# Patient Record
Sex: FEMALE | Race: WHITE | NOT HISPANIC OR LATINO | Employment: UNEMPLOYED | URBAN - METROPOLITAN AREA
[De-identification: names, ages, dates, MRNs, and addresses within clinical notes are randomized per-mention and may not be internally consistent; named-entity substitution may affect disease eponyms.]

---

## 2017-05-06 DIAGNOSIS — E78.5 HYPERLIPIDEMIA: ICD-10-CM

## 2017-08-04 ENCOUNTER — APPOINTMENT (OUTPATIENT)
Dept: LAB | Facility: HOSPITAL | Age: 42
End: 2017-08-04
Payer: COMMERCIAL

## 2017-08-04 ENCOUNTER — ALLSCRIPTS OFFICE VISIT (OUTPATIENT)
Dept: OTHER | Facility: OTHER | Age: 42
End: 2017-08-04

## 2017-08-04 DIAGNOSIS — N39.0 URINARY TRACT INFECTION: ICD-10-CM

## 2017-08-04 LAB
BILIRUB UR QL STRIP: NORMAL
CLARITY UR: NORMAL
COLOR UR: YELLOW
GLUCOSE (HISTORICAL): NORMAL
HGB UR QL STRIP.AUTO: NORMAL
KETONES UR STRIP-MCNC: NORMAL MG/DL
LEUKOCYTE ESTERASE UR QL STRIP: NORMAL
NITRITE UR QL STRIP: NORMAL
PH UR STRIP.AUTO: 5.5 [PH]
PROT UR STRIP-MCNC: 30 MG/DL
SP GR UR STRIP.AUTO: 1.03
UROBILINOGEN UR QL STRIP.AUTO: 0.2

## 2017-08-04 PROCEDURE — 87086 URINE CULTURE/COLONY COUNT: CPT

## 2017-08-06 LAB — BACTERIA UR CULT: NORMAL

## 2017-08-07 ENCOUNTER — GENERIC CONVERSION - ENCOUNTER (OUTPATIENT)
Dept: OTHER | Facility: OTHER | Age: 42
End: 2017-08-07

## 2017-08-15 ENCOUNTER — GENERIC CONVERSION - ENCOUNTER (OUTPATIENT)
Dept: OTHER | Facility: OTHER | Age: 42
End: 2017-08-15

## 2017-09-08 ENCOUNTER — ALLSCRIPTS OFFICE VISIT (OUTPATIENT)
Dept: OTHER | Facility: OTHER | Age: 42
End: 2017-09-08

## 2017-12-10 ENCOUNTER — HOSPITAL ENCOUNTER (EMERGENCY)
Facility: HOSPITAL | Age: 42
Discharge: HOME | End: 2017-12-10
Admitting: EMERGENCY MEDICINE
Payer: COMMERCIAL

## 2017-12-10 ENCOUNTER — APPOINTMENT (EMERGENCY)
Dept: CT IMAGING | Facility: HOSPITAL | Age: 42
End: 2017-12-10
Payer: COMMERCIAL

## 2017-12-10 VITALS
HEART RATE: 88 BPM | OXYGEN SATURATION: 98 % | SYSTOLIC BLOOD PRESSURE: 124 MMHG | TEMPERATURE: 98.2 F | RESPIRATION RATE: 16 BRPM | DIASTOLIC BLOOD PRESSURE: 72 MMHG | WEIGHT: 170 LBS

## 2017-12-10 DIAGNOSIS — N20.1 LEFT URETERAL STONE: Primary | ICD-10-CM

## 2017-12-10 LAB
ANION GAP SERPL CALCULATED.3IONS-SCNC: 5 MMOL/L (ref 4–13)
BACTERIA UR QL AUTO: ABNORMAL /HPF
BASOPHILS # BLD AUTO: 0.08 THOUSANDS/ΜL (ref 0–0.1)
BASOPHILS NFR BLD AUTO: 1 % (ref 0–1)
BILIRUB UR QL STRIP: NEGATIVE
BUN SERPL-MCNC: 11 MG/DL (ref 5–25)
CALCIUM SERPL-MCNC: 9.3 MG/DL (ref 8.3–10.1)
CHLORIDE SERPL-SCNC: 103 MMOL/L (ref 100–108)
CLARITY UR: ABNORMAL
CO2 SERPL-SCNC: 30 MMOL/L (ref 21–32)
COLOR UR: YELLOW
CREAT SERPL-MCNC: 0.9 MG/DL (ref 0.6–1.3)
EOSINOPHIL # BLD AUTO: 0.6 THOUSAND/ΜL (ref 0–0.61)
EOSINOPHIL NFR BLD AUTO: 5 % (ref 0–6)
ERYTHROCYTE [DISTWIDTH] IN BLOOD BY AUTOMATED COUNT: 13.1 % (ref 11.6–15.1)
GFR SERPL CREATININE-BSD FRML MDRD: 80 ML/MIN/1.73SQ M
GLUCOSE SERPL-MCNC: 267 MG/DL (ref 65–140)
GLUCOSE UR STRIP-MCNC: ABNORMAL MG/DL
HCT VFR BLD AUTO: 41.1 % (ref 34.8–46.1)
HGB BLD-MCNC: 14.2 G/DL (ref 11.5–15.4)
HGB UR QL STRIP.AUTO: ABNORMAL
KETONES UR STRIP-MCNC: NEGATIVE MG/DL
LEUKOCYTE ESTERASE UR QL STRIP: NEGATIVE
LYMPHOCYTES # BLD AUTO: 1.54 THOUSANDS/ΜL (ref 0.6–4.47)
LYMPHOCYTES NFR BLD AUTO: 12 % (ref 14–44)
MCH RBC QN AUTO: 31.6 PG (ref 26.8–34.3)
MCHC RBC AUTO-ENTMCNC: 34.5 G/DL (ref 31.4–37.4)
MCV RBC AUTO: 92 FL (ref 82–98)
MONOCYTES # BLD AUTO: 0.65 THOUSAND/ΜL (ref 0.17–1.22)
MONOCYTES NFR BLD AUTO: 5 % (ref 4–12)
NEUTROPHILS # BLD AUTO: 9.63 THOUSANDS/ΜL (ref 1.85–7.62)
NEUTS SEG NFR BLD AUTO: 77 % (ref 43–75)
NITRITE UR QL STRIP: NEGATIVE
NON-SQ EPI CELLS URNS QL MICRO: ABNORMAL /HPF
NRBC BLD AUTO-RTO: 0 /100 WBCS
PH UR STRIP.AUTO: 5 [PH] (ref 4.5–8)
PLATELET # BLD AUTO: 273 THOUSANDS/UL (ref 149–390)
PMV BLD AUTO: 11.7 FL (ref 8.9–12.7)
POTASSIUM SERPL-SCNC: 3.8 MMOL/L (ref 3.5–5.3)
PROT UR STRIP-MCNC: ABNORMAL MG/DL
RBC # BLD AUTO: 4.49 MILLION/UL (ref 3.81–5.12)
RBC #/AREA URNS AUTO: ABNORMAL /HPF
SODIUM SERPL-SCNC: 138 MMOL/L (ref 136–145)
SP GR UR STRIP.AUTO: 1.02 (ref 1–1.03)
UROBILINOGEN UR QL STRIP.AUTO: 0.2 E.U./DL
WBC # BLD AUTO: 12.5 THOUSAND/UL (ref 4.31–10.16)
WBC #/AREA URNS AUTO: ABNORMAL /HPF

## 2017-12-10 PROCEDURE — 96374 THER/PROPH/DIAG INJ IV PUSH: CPT

## 2017-12-10 PROCEDURE — 85025 COMPLETE CBC W/AUTO DIFF WBC: CPT | Performed by: PHYSICIAN ASSISTANT

## 2017-12-10 PROCEDURE — 80048 BASIC METABOLIC PNL TOTAL CA: CPT | Performed by: PHYSICIAN ASSISTANT

## 2017-12-10 PROCEDURE — 99284 EMERGENCY DEPT VISIT MOD MDM: CPT

## 2017-12-10 PROCEDURE — 74176 CT ABD & PELVIS W/O CONTRAST: CPT

## 2017-12-10 PROCEDURE — 81001 URINALYSIS AUTO W/SCOPE: CPT

## 2017-12-10 PROCEDURE — 96361 HYDRATE IV INFUSION ADD-ON: CPT

## 2017-12-10 PROCEDURE — 36415 COLL VENOUS BLD VENIPUNCTURE: CPT | Performed by: PHYSICIAN ASSISTANT

## 2017-12-10 RX ORDER — OXYCODONE HYDROCHLORIDE AND ACETAMINOPHEN 5; 325 MG/1; MG/1
1 TABLET ORAL EVERY 4 HOURS PRN
Qty: 10 TABLET | Refills: 0 | Status: SHIPPED | OUTPATIENT
Start: 2017-12-10 | End: 2017-12-10

## 2017-12-10 RX ORDER — HYDROCODONE BITARTRATE AND ACETAMINOPHEN 5; 325 MG/1; MG/1
1 TABLET ORAL EVERY 6 HOURS PRN
Qty: 10 TABLET | Refills: 0 | Status: SHIPPED | OUTPATIENT
Start: 2017-12-10 | End: 2017-12-13

## 2017-12-10 RX ORDER — KETOROLAC TROMETHAMINE 30 MG/ML
15 INJECTION, SOLUTION INTRAMUSCULAR; INTRAVENOUS ONCE
Status: COMPLETED | OUTPATIENT
Start: 2017-12-10 | End: 2017-12-10

## 2017-12-10 RX ADMIN — KETOROLAC TROMETHAMINE 15 MG: 30 INJECTION, SOLUTION INTRAMUSCULAR at 15:28

## 2017-12-10 RX ADMIN — SODIUM CHLORIDE 1000 ML: 0.9 INJECTION, SOLUTION INTRAVENOUS at 15:28

## 2017-12-10 NOTE — DISCHARGE INSTRUCTIONS
Ureteral Stones   WHAT YOU NEED TO KNOW:   A ureteral stone is a stone that forms in the kidney and moves down the ureter and gets stuck there  The ureter is the tube that takes urine from the kidney to the bladder  Stones can form in the urinary system when your urine has high levels of minerals and salts  Urinary stones can be made of uric acid, calcium, phosphate, or oxalate crystals  DISCHARGE INSTRUCTIONS:   Return to the emergency department if:   · You have severe pain that does not improve, even after you take medicine  · You have vomiting that is not relieved by medicine  · You develop a fever  Contact your healthcare provider if:   · You develop a fever  · You have any questions or concerns about your condition or care  Follow up with your healthcare provider as directed: You may need to return for more tests  Write down your questions so you remember to ask them during your visits  Medicines: You may need any of the following:  · NSAIDs , such as ibuprofen, help decrease swelling, pain, and fever  This medicine is available with or without a doctor's order  NSAIDs can cause stomach bleeding or kidney problems in certain people  If you take blood thinner medicine, always ask your healthcare provider if NSAIDs are safe for you  Always read the medicine label and follow directions  · Prescription pain medicine  may help decrease pain or help your ureteral stone pass  Do not wait until the pain is severe before you take pain medicine  · Nausea medicine  may help calm your stomach and prevent vomiting  · Take your medicine as directed  Contact your healthcare provider if you think your medicine is not helping or if you have side effects  Tell him or her if you are allergic to any medicine  Keep a list of the medicines, vitamins, and herbs you take  Include the amounts, and when and why you take them  Bring the list or the pill bottles to follow-up visits   Carry your medicine list with you in case of an emergency  Self-care:   · Drink plenty of liquids  Your healthcare provider may tell you to drink at least 8 to 12 (eight-ounce) cups of liquids each day  This helps flush out the ureteral stones when you urinate  Water is the best liquid to drink  · Strain your urine every time you go to the bathroom  Urinate through a strainer or a piece of thin cloth to catch the stones  Take the stones to your healthcare provider so they can be sent to the lab for tests  This will help your healthcare providers plan the best treatment for you  · Ask your healthcare provider about any nutrition changes you need to make  You may need to limit certain foods such as foods high in sodium (salt), certain protein foods, or foods high in oxalate  After you pass your ureteral stone: Once you have passed your ureteral stone, you may need to do a 24-hour urine test  You may need to save all of your urine for 24 hours  Each time you go to the bathroom, you will urinate into a container  Then you will pour your urine into a larger container that is kept cold  You may be told to write down the time and amount of urine you passed  At the end of 24 hours, the urine is sent to a lab for tests  Results from the test will help your healthcare provider plan ways to prevent more stones from forming  © 2017 [address] Information is for End User's use only and may not be sold, redistributed or otherwise used for commercial purposes  All illustrations and images included in CareNotes® are the copyrighted property of [employer] Inc  or [employer]  The above information is an  only  It is not intended as medical advice for individual conditions or treatments  Talk to your doctor, nurse or pharmacist before following any medical regimen to see if it is safe and effective for you        DISCHARGE INSTRUCTIONS:    FOLLOW UP WITH YOUR PRIMARY CARE PROVIDER OR THE [facility]  MAKE AN APPOINTMENT TO BE SEEN  TAKE TYLENOL OR MOTRIN FOR MILD PAIN  TAKE PERCOCET AS PRESCRIBED FOR MODERATE PAIN  IF RASH, SHORTNESS OF BREATH OR TROUBLE SWALLOWING, STOP TAKING THE MEDICATION AND BE SEEN  NO DRINKING, DRIVING OR OPERATING HEAVY MACHINERY WHILE TAKING THIS MEDICATION  FOLLOW UP WITH YOUR UROLOGIST  REST AND DRINK PLENTY OF FLUIDS  IF SYMPTOMS WORSEN OR NEW SYMPTOMS ARISE, RETURN TO THE ER TO BE SEEN

## 2017-12-10 NOTE — ED PROVIDER NOTES
History  Chief Complaint   Patient presents with    Flank Pain     C/O left sided flank pain started 20 min ago had stones in past states feels same also c/o nausea and sweating     42y  o female with PMH of anxiety, depression, DM, hypothyroid, neuropathy, PVC and kidney stones presents to the ER for left flank pain for 2 hours  Patient denies taking any medication for pain  She describes her pain as sharp and radiating to her LLQ  She rates her pain 6/10 and states it comes and goes  Patient states she sees Dr Baldo Richter from Urology  Associated symptoms: nausea  She denies fever, chills, chest pain, dyspnea, vomiting, diarrhea, hematuria, dysuria, frequency, urgency, weakness or paresthesias  Patient states this feels like when she had a kidney stone in the past         History provided by:  Patient   used: No        None       Past Medical History:   Diagnosis Date    Anxiety     Depression     Diabetes mellitus ([facility] [address] )     Disease of thyroid gland     Neuropathy     PVC (premature ventricular contraction)        Past Surgical History:   Procedure Laterality Date    CARPAL TUNNEL RELEASE       SECTION      FINGER SURGERY      HYSTERECTOMY      KNEE SURGERY         History reviewed  No pertinent family history  I have reviewed and agree with the history as documented  Social History   Substance Use Topics    Smoking status: Current Some Day Smoker    Smokeless tobacco: Never Used    Alcohol use Yes      Comment: occasional        Review of Systems   Constitutional: Negative for chills and fever  Respiratory: Negative for shortness of breath  Cardiovascular: Negative for chest pain  Gastrointestinal: Positive for abdominal pain and nausea  Negative for diarrhea and vomiting  Genitourinary: Positive for flank pain  Negative for dysuria, frequency, hematuria and urgency  Musculoskeletal: Negative for neck stiffness  Skin: Negative for rash  Allergic/Immunologic: Negative for food allergies  Neurological: Negative for weakness and numbness  Physical Exam  ED Triage Vitals [12/10/17 1408]   Temperature Pulse Respirations Blood Pressure SpO2   98 2 °F (36 8 °C) 85 18 132/74 98 %      Temp Source Heart Rate Source Patient Position - Orthostatic VS BP Location FiO2 (%)   Oral Monitor Sitting Right arm --      Pain Score       7           Orthostatic Vital Signs  Vitals:    12/10/17 1408 12/10/17 1656   BP: 132/74 124/72   Pulse: 85 88   Patient Position - Orthostatic VS: Sitting Lying       Physical Exam   Constitutional:  Non-toxic appearance  No distress  HENT:   Head: Normocephalic and atraumatic  Right Ear: Tympanic membrane, external ear and ear canal normal  No drainage, swelling or tenderness  No foreign bodies  Tympanic membrane is not erythematous  No hemotympanum  Left Ear: Tympanic membrane, external ear and ear canal normal  No drainage, swelling or tenderness  No foreign bodies  Tympanic membrane is not erythematous  No hemotympanum  Nose: Nose normal    Mouth/Throat: Uvula is midline, oropharynx is clear and moist and mucous membranes are normal  No uvula swelling  No posterior oropharyngeal edema, posterior oropharyngeal erythema or tonsillar abscesses  No tonsillar exudate  Neck: Normal range of motion and phonation normal  Neck supple  No tracheal deviation present  Cardiovascular: Normal rate, regular rhythm, S1 normal, S2 normal and normal heart sounds  Exam reveals no gallop and no friction rub  No murmur heard  Pulmonary/Chest: Effort normal and breath sounds normal  No respiratory distress  She has no decreased breath sounds  She has no wheezes  She has no rhonchi  She has no rales  She exhibits no tenderness  Abdominal: Soft  Bowel sounds are normal  She exhibits no distension  There is no tenderness  There is CVA tenderness (left)  There is no rebound and no guarding  Neurological: She is alert   GCS eye subscore is 4  GCS verbal subscore is 5  GCS motor subscore is 6  Skin: Skin is warm and dry  No rash noted  Psychiatric: She has a normal mood and affect  Nursing note and vitals reviewed  ED Medications  Medications   sodium chloride 0 9 % bolus 1,000 mL (0 mL Intravenous Stopped 12/10/17 1657)   ketorolac (TORADOL) injection 15 mg (15 mg Intravenous Given 12/10/17 1528)       Diagnostic Studies  Results Reviewed     Procedure Component Value Units Date/Time    Basic metabolic panel [[id number]]  (Abnormal) Collected:  12/10/17 1531    Lab Status:  Final result Specimen:  Blood from Arm, Right Updated:  12/10/17 1554     Sodium 138 mmol/L      Potassium 3 8 mmol/L      Chloride 103 mmol/L      CO2 30 mmol/L      Anion Gap 5 mmol/L      BUN 11 mg/dL      Creatinine 0 90 mg/dL      Glucose 267 (H) mg/dL      Calcium 9 3 mg/dL      eGFR 80 ml/min/1 73sq m     Narrative:         National Kidney Disease Education Program recommendations are as follows:  GFR calculation is accurate only with a steady state creatinine  Chronic Kidney disease less than 60 ml/min/1 73 sq  meters  Kidney failure less than 15 ml/min/1 73 sq  meters      CBC and differential [[id number]]  (Abnormal) Collected:  12/10/17 1531    Lab Status:  Final result Specimen:  Blood from Arm, Right Updated:  12/10/17 1545     WBC 12 50 (H) Thousand/uL      RBC 4 49 Million/uL      Hemoglobin 14 2 g/dL      Hematocrit 41 1 %      MCV 92 fL      MCH 31 6 pg      MCHC 34 5 g/dL      RDW 13 1 %      MPV 11 7 fL      Platelets 227 Thousands/uL      nRBC 0 /100 WBCs      Neutrophils Relative 77 (H) %      Lymphocytes Relative 12 (L) %      Monocytes Relative 5 %      Eosinophils Relative 5 %      Basophils Relative 1 %      Neutrophils Absolute 9 63 (H) Thousands/µL      Lymphocytes Absolute 1 54 Thousands/µL      Monocytes Absolute 0 65 Thousand/µL      Eosinophils Absolute 0 60 Thousand/µL      Basophils Absolute 0 08 Thousands/µL     Urine Microscopic [[id number]]  (Abnormal) Collected:  12/10/17 1454    Lab Status:  Final result Specimen:  Urine from Urine, Clean Catch Updated:  12/10/17 1507     RBC, UA 30-50 (A) /hpf      WBC, UA 4-10 (A) /hpf      Epithelial Cells None Seen /hpf      Bacteria, UA Occasional /hpf     ED Urine Macroscopic [[id number]]  (Abnormal) Collected:  12/10/17 1454    Lab Status:  Final result Specimen:  Urine Updated:  12/10/17 1438     Color, UA Yellow     Clarity, UA Cloudy     pH, UA 5 0     Leukocytes, UA Negative     Nitrite, UA Negative     Protein, UA 30 (1+) (A) mg/dl      Glucose,  (1/2%) (A) mg/dl      Ketones, UA Negative mg/dl      Urobilinogen, UA 0 2 E U /dl      Bilirubin, UA Negative     Blood, UA Large (A)     Specific [place], UA 1 020    Narrative:       CLINITEK RESULT                 CT renal stone study abdomen pelvis without contrast   Final Result by Payal Sandhu MD (12/10 1518)      5 mm proximal left ureteral calculus with mild left hydronephrosis  Nonobstructing left renal calculus  Workstation performed: [id number]                    Procedures  Procedures       Phone Contacts  ED Phone Contact    ED Course  ED Course as of Dec 10 2202   Magnolia Finch Dec 10, 2017   1625 Patient reports improvement in pain  Will discharge  1640 Patient states Percocet makes her vomit  Patient requesting Norco instead  MDM  Number of Diagnoses or Management Options  Left ureteral stone: new and requires workup  Diagnosis management comments: DDX consists of but not limited to: kidney stone, UTI, pyelonephritis, diverticulitis    Will check CBC and BMP  Will give fluids and Toradol for pain  Will CT abdomen to r/o stone  1625 Patient reports improvement in pain  Informed of CT findings and blood work results  Will discharge  1640 Patient states Percocet makes her vomit  Patient requesting Norco instead        At discharge, I instructed the patient to:  -follow up with pcp  -take Tylenol or Motrin for mild pain  -take Norco as prescribed for moderate pain  -follow up with the recommended Urologist  -rest and drink plenty of fluids  -return to the ER if symptoms worsened or new symptoms arose  Patient agreed to this plan and was stable at time of discharge  Amount and/or Complexity of Data Reviewed  Clinical lab tests: ordered and reviewed  Tests in the radiology section of CPT®: ordered and reviewed    Patient Progress  Patient progress: stable    CritCare Time    Disposition  Final diagnoses:   Left ureteral stone     Time reflects when diagnosis was documented in both MDM as applicable and the Disposition within this note     Time User Action Codes Description Comment    12/10/2017  4:27 PM Kaity Cassette Add [N20 1] Left ureteral stone       ED Disposition     ED Disposition Condition Comment    Discharge  Joe Vital discharge to home/self care  Condition at discharge: Stable        Follow-up Information     Follow up With Specialties Details Why [address], DO Internal Medicine Schedule an appointment as soon as possible for a visit in 1 day  [facility] [phone]  Lou Correia [phone], MD Urology Schedule an appointment as soon as possible for a visit in 1 day  [address] [place], [place] [phone]          Discharge Medication List as of 12/10/2017  4:29 PM      START taking these medications    Details   oxyCODONE-acetaminophen (PERCOCET) 5-325 mg per tablet Take 1 tablet by mouth every 4 (four) hours as needed for moderate pain Max Daily Amount: 6 tablets, Starting Sun 12/10/2017, Print           No discharge procedures on file      ED Provider  Electronically Signed by           Tisha Gonzalez PA-C  12/10/17 1835

## 2017-12-11 ENCOUNTER — GENERIC CONVERSION - ENCOUNTER (OUTPATIENT)
Dept: OTHER | Facility: OTHER | Age: 42
End: 2017-12-11

## 2017-12-11 ENCOUNTER — GENERIC CONVERSION - ENCOUNTER (OUTPATIENT)
Dept: UROLOGY | Facility: MEDICAL CENTER | Age: 42
End: 2017-12-11

## 2017-12-11 ENCOUNTER — APPOINTMENT (OUTPATIENT)
Dept: LAB | Facility: HOSPITAL | Age: 42
End: 2017-12-11
Attending: UROLOGY
Payer: COMMERCIAL

## 2017-12-11 ENCOUNTER — ALLSCRIPTS OFFICE VISIT (OUTPATIENT)
Dept: OTHER | Facility: OTHER | Age: 42
End: 2017-12-11

## 2017-12-11 ENCOUNTER — TRANSCRIBE ORDERS (OUTPATIENT)
Dept: ADMINISTRATIVE | Facility: HOSPITAL | Age: 42
End: 2017-12-11

## 2017-12-11 ENCOUNTER — HOSPITAL ENCOUNTER (OUTPATIENT)
Dept: NON INVASIVE DIAGNOSTICS | Facility: HOSPITAL | Age: 42
Discharge: HOME | End: 2017-12-11
Attending: UROLOGY
Payer: COMMERCIAL

## 2017-12-11 DIAGNOSIS — N39.0 URINARY TRACT INFECTION WITHOUT HEMATURIA, SITE UNSPECIFIED: ICD-10-CM

## 2017-12-11 DIAGNOSIS — N39.0 URINARY TRACT INFECTION: ICD-10-CM

## 2017-12-11 DIAGNOSIS — N39.0 URINARY TRACT INFECTION WITHOUT HEMATURIA, SITE UNSPECIFIED: Primary | ICD-10-CM

## 2017-12-11 LAB
APTT PPP: 33 SECONDS (ref 23–35)
BILIRUB UR QL STRIP: NORMAL
CLARITY UR: NORMAL
COLOR UR: YELLOW
GLUCOSE (HISTORICAL): 500
HGB UR QL STRIP.AUTO: NORMAL
INR PPP: 0.92 (ref 0.86–1.16)
KETONES UR STRIP-MCNC: NORMAL MG/DL
LEUKOCYTE ESTERASE UR QL STRIP: NORMAL
NITRITE UR QL STRIP: NORMAL
PH UR STRIP.AUTO: 5.5 [PH]
PROT UR STRIP-MCNC: NORMAL MG/DL
PROTHROMBIN TIME: 12.4 SECONDS (ref 12.1–14.4)
SP GR UR STRIP.AUTO: 1.02
UROBILINOGEN UR QL STRIP.AUTO: 0.2

## 2017-12-11 PROCEDURE — 85730 THROMBOPLASTIN TIME PARTIAL: CPT

## 2017-12-11 PROCEDURE — 85610 PROTHROMBIN TIME: CPT

## 2017-12-11 PROCEDURE — 87086 URINE CULTURE/COLONY COUNT: CPT

## 2017-12-11 PROCEDURE — 36415 COLL VENOUS BLD VENIPUNCTURE: CPT

## 2017-12-11 PROCEDURE — 93005 ELECTROCARDIOGRAM TRACING: CPT

## 2017-12-12 LAB
ATRIAL RATE: 71 BPM
P AXIS: 60 DEGREES
PR INTERVAL: 144 MS
QRS AXIS: 45 DEGREES
QRSD INTERVAL: 72 MS
QT INTERVAL: 388 MS
QTC INTERVAL: 421 MS
T WAVE AXIS: 39 DEGREES
VENTRICULAR RATE: 71 BPM

## 2017-12-13 ENCOUNTER — ANESTHESIA EVENT (OUTPATIENT)
Dept: PERIOP | Facility: HOSPITAL | Age: 42
End: 2017-12-13
Payer: COMMERCIAL

## 2017-12-13 LAB
BACTERIA UR CULT: ABNORMAL
BACTERIA UR CULT: ABNORMAL

## 2017-12-13 RX ORDER — MONTELUKAST SODIUM 10 MG/1
10 TABLET ORAL
COMMUNITY

## 2017-12-13 RX ORDER — HYDROCODONE BITARTRATE AND ACETAMINOPHEN 10; 300 MG/1; MG/1
1 TABLET ORAL EVERY 6 HOURS PRN
COMMUNITY
End: 2017-12-26 | Stop reason: ALTCHOICE

## 2017-12-13 RX ORDER — LEVOTHYROXINE SODIUM 88 UG/1
88 TABLET ORAL EVERY EVENING
Status: ON HOLD | COMMUNITY
End: 2017-12-14 | Stop reason: SDUPTHER

## 2017-12-13 RX ORDER — ATORVASTATIN CALCIUM 20 MG/1
20 TABLET, FILM COATED ORAL EVERY EVENING
COMMUNITY
End: 2018-04-20 | Stop reason: SDUPTHER

## 2017-12-13 RX ORDER — SERTRALINE HCL 100 MG
200 TABLET ORAL
COMMUNITY
End: 2017-12-26 | Stop reason: DRUGHIGH

## 2017-12-13 RX ORDER — METOPROLOL SUCCINATE 25 MG/1
25 TABLET, EXTENDED RELEASE ORAL EVERY EVENING
COMMUNITY

## 2017-12-13 NOTE — PRE-PROCEDURE INSTRUCTIONS
Pre-Surgery Instructions:   Medication Instructions    atorvastatin (LIPITOR) 20 mg tablet Instructed patient per Anesthesia Guidelines   HYDROcodone-acetaminophen (XODOL )  MG per tablet Instructed patient per Anesthesia Guidelines   insulin glulisine (APIDRA) 100 units/mL Instructed patient per Anesthesia Guidelines   levothyroxine (SYNTHROID) 88 mcg tablet Instructed patient per Anesthesia Guidelines   metoprolol succinate (TOPROL-XL) 25 mg 24 hr tablet Instructed patient per Anesthesia Guidelines   montelukast (SINGULAIR) 10 mg tablet Instructed patient per Anesthesia Guidelines   sertraline (ZOLOFT) 100 mg tablet Instructed patient per Anesthesia Guidelines  Pt spoke to Dr Ab Tapia regarding insulin pump and understands what she needs to do for tomorrow's surgery, as she has in the past she reports "she lessens basal rates so blood sugar doesn't drop too low"  He answered pt's questions and concerns  Pt via phone  given/reviewed [facility] preop instructions and she plans to  chlorhexadine soap

## 2017-12-14 ENCOUNTER — APPOINTMENT (OUTPATIENT)
Dept: RADIOLOGY | Facility: HOSPITAL | Age: 42
End: 2017-12-14
Payer: COMMERCIAL

## 2017-12-14 ENCOUNTER — GENERIC CONVERSION - ENCOUNTER (OUTPATIENT)
Dept: OTHER | Facility: OTHER | Age: 42
End: 2017-12-14

## 2017-12-14 ENCOUNTER — HOSPITAL ENCOUNTER (OUTPATIENT)
Facility: HOSPITAL | Age: 42
Setting detail: OUTPATIENT SURGERY
Discharge: HOME | End: 2017-12-14
Attending: UROLOGY | Admitting: UROLOGY
Payer: COMMERCIAL

## 2017-12-14 ENCOUNTER — HOSPITAL ENCOUNTER (OUTPATIENT)
Dept: RADIOLOGY | Facility: HOSPITAL | Age: 42
Setting detail: OUTPATIENT SURGERY
Discharge: HOME | End: 2017-12-14
Payer: COMMERCIAL

## 2017-12-14 ENCOUNTER — ANESTHESIA (OUTPATIENT)
Dept: PERIOP | Facility: HOSPITAL | Age: 42
End: 2017-12-14
Payer: COMMERCIAL

## 2017-12-14 VITALS
OXYGEN SATURATION: 98 % | SYSTOLIC BLOOD PRESSURE: 114 MMHG | BODY MASS INDEX: 30.12 KG/M2 | DIASTOLIC BLOOD PRESSURE: 71 MMHG | RESPIRATION RATE: 16 BRPM | HEART RATE: 83 BPM | TEMPERATURE: 98.4 F | HEIGHT: 63 IN | WEIGHT: 170 LBS

## 2017-12-14 DIAGNOSIS — N20.1 CALCULUS OF URETER: ICD-10-CM

## 2017-12-14 LAB
GLUCOSE SERPL-MCNC: 132 MG/DL (ref 65–140)
GLUCOSE SERPL-MCNC: 76 MG/DL (ref 65–140)

## 2017-12-14 PROCEDURE — C2625 STENT, NON-COR, TEM W/DEL SY: HCPCS | Performed by: UROLOGY

## 2017-12-14 PROCEDURE — 74000 HB X-RAY EXAM OF ABDOMEN (SINGLE ANTEROPOSTERIOR VIEW): CPT

## 2017-12-14 PROCEDURE — C1726 CATH, BAL DIL, NON-VASCULAR: HCPCS | Performed by: UROLOGY

## 2017-12-14 PROCEDURE — 82948 REAGENT STRIP/BLOOD GLUCOSE: CPT

## 2017-12-14 PROCEDURE — 74450 X-RAY URETHRA/BLADDER: CPT

## 2017-12-14 DEVICE — KWART RETRO-INJECT URETERAL STENT SET
Type: IMPLANTABLE DEVICE | Site: URETER | Status: FUNCTIONAL
Brand: KWART

## 2017-12-14 RX ORDER — MAGNESIUM HYDROXIDE 1200 MG/15ML
LIQUID ORAL AS NEEDED
Status: DISCONTINUED | OUTPATIENT
Start: 2017-12-14 | End: 2017-12-14 | Stop reason: HOSPADM

## 2017-12-14 RX ORDER — OXYCODONE HYDROCHLORIDE AND ACETAMINOPHEN 5; 325 MG/1; MG/1
1 TABLET ORAL EVERY 4 HOURS PRN
Status: DISCONTINUED | OUTPATIENT
Start: 2017-12-14 | End: 2017-12-14

## 2017-12-14 RX ORDER — MIDAZOLAM HYDROCHLORIDE 1 MG/ML
INJECTION INTRAMUSCULAR; INTRAVENOUS AS NEEDED
Status: DISCONTINUED | OUTPATIENT
Start: 2017-12-14 | End: 2017-12-14 | Stop reason: SURG

## 2017-12-14 RX ORDER — SERTRALINE HYDROCHLORIDE 100 MG/1
200 TABLET, FILM COATED ORAL DAILY
Status: ON HOLD | COMMUNITY
Start: 2017-06-12 | End: 2017-12-14 | Stop reason: SDUPTHER

## 2017-12-14 RX ORDER — SCOLOPAMINE TRANSDERMAL SYSTEM 1 MG/1
1 PATCH, EXTENDED RELEASE TRANSDERMAL ONCE AS NEEDED
Status: DISCONTINUED | OUTPATIENT
Start: 2017-12-14 | End: 2017-12-14 | Stop reason: HOSPADM

## 2017-12-14 RX ORDER — ONDANSETRON 2 MG/ML
4 INJECTION INTRAMUSCULAR; INTRAVENOUS ONCE AS NEEDED
Status: DISCONTINUED | OUTPATIENT
Start: 2017-12-14 | End: 2017-12-14 | Stop reason: HOSPADM

## 2017-12-14 RX ORDER — LEVOTHYROXINE SODIUM 88 UG/1
88 TABLET ORAL DAILY
COMMUNITY
Start: 2017-08-31 | End: 2020-11-11 | Stop reason: SDUPTHER

## 2017-12-14 RX ORDER — METOPROLOL SUCCINATE 25 MG/1
25 TABLET, EXTENDED RELEASE ORAL DAILY
Status: ON HOLD | COMMUNITY
Start: 2017-02-11 | End: 2017-12-14 | Stop reason: SDUPTHER

## 2017-12-14 RX ORDER — HYDROCODONE BITARTRATE AND ACETAMINOPHEN 10; 300 MG/1; MG/1
1 TABLET ORAL EVERY 6 HOURS PRN
Status: ON HOLD | COMMUNITY
Start: 2017-12-11 | End: 2017-12-14 | Stop reason: SDUPTHER

## 2017-12-14 RX ORDER — ALPRAZOLAM 0.5 MG/1
0.5 TABLET ORAL 2 TIMES DAILY PRN
COMMUNITY
Start: 2017-08-21

## 2017-12-14 RX ORDER — FENTANYL CITRATE 50 UG/ML
INJECTION, SOLUTION INTRAMUSCULAR; INTRAVENOUS AS NEEDED
Status: DISCONTINUED | OUTPATIENT
Start: 2017-12-14 | End: 2017-12-14 | Stop reason: SURG

## 2017-12-14 RX ORDER — PROPOFOL 10 MG/ML
INJECTION, EMULSION INTRAVENOUS AS NEEDED
Status: DISCONTINUED | OUTPATIENT
Start: 2017-12-14 | End: 2017-12-14 | Stop reason: SURG

## 2017-12-14 RX ORDER — GLYCOPYRROLATE 0.2 MG/ML
INJECTION INTRAMUSCULAR; INTRAVENOUS AS NEEDED
Status: DISCONTINUED | OUTPATIENT
Start: 2017-12-14 | End: 2017-12-14 | Stop reason: SURG

## 2017-12-14 RX ORDER — FENTANYL CITRATE/PF 50 MCG/ML
50 SYRINGE (ML) INJECTION
Status: DISCONTINUED | OUTPATIENT
Start: 2017-12-14 | End: 2017-12-14 | Stop reason: HOSPADM

## 2017-12-14 RX ORDER — MONTELUKAST SODIUM 10 MG/1
10 TABLET ORAL
Status: ON HOLD | COMMUNITY
End: 2017-12-14 | Stop reason: SDUPTHER

## 2017-12-14 RX ORDER — SODIUM CHLORIDE 9 MG/ML
125 INJECTION, SOLUTION INTRAVENOUS CONTINUOUS
Status: DISCONTINUED | OUTPATIENT
Start: 2017-12-14 | End: 2017-12-14 | Stop reason: HOSPADM

## 2017-12-14 RX ORDER — ATORVASTATIN CALCIUM 20 MG/1
20 TABLET, FILM COATED ORAL DAILY
Status: ON HOLD | COMMUNITY
Start: 2017-03-06 | End: 2017-12-14 | Stop reason: ALTCHOICE

## 2017-12-14 RX ORDER — OXYCODONE HYDROCHLORIDE AND ACETAMINOPHEN 5; 325 MG/1; MG/1
1 TABLET ORAL EVERY 4 HOURS PRN
Qty: 15 TABLET | Refills: 0 | Status: SHIPPED | OUTPATIENT
Start: 2017-12-14 | End: 2017-12-26

## 2017-12-14 RX ORDER — GABAPENTIN 100 MG/1
200 CAPSULE ORAL
COMMUNITY
Start: 2016-08-11

## 2017-12-14 RX ORDER — ROCURONIUM BROMIDE 10 MG/ML
INJECTION, SOLUTION INTRAVENOUS AS NEEDED
Status: DISCONTINUED | OUTPATIENT
Start: 2017-12-14 | End: 2017-12-14 | Stop reason: SURG

## 2017-12-14 RX ORDER — HYDROCODONE BITARTRATE AND ACETAMINOPHEN 5; 325 MG/1; MG/1
2 TABLET ORAL EVERY 6 HOURS PRN
Status: DISCONTINUED | OUTPATIENT
Start: 2017-12-14 | End: 2017-12-14 | Stop reason: HOSPADM

## 2017-12-14 RX ORDER — ONDANSETRON 2 MG/ML
INJECTION INTRAMUSCULAR; INTRAVENOUS AS NEEDED
Status: DISCONTINUED | OUTPATIENT
Start: 2017-12-14 | End: 2017-12-14 | Stop reason: SURG

## 2017-12-14 RX ORDER — HYDROCODONE BITARTRATE AND ACETAMINOPHEN 5; 325 MG/1; MG/1
1 TABLET ORAL EVERY 6 HOURS PRN
Status: DISCONTINUED | OUTPATIENT
Start: 2017-12-14 | End: 2017-12-14 | Stop reason: HOSPADM

## 2017-12-14 RX ADMIN — LIDOCAINE HYDROCHLORIDE 100 MG: 20 INJECTION, SOLUTION INTRAVENOUS at 13:20

## 2017-12-14 RX ADMIN — SCOPALAMINE 1 PATCH: 1 PATCH, EXTENDED RELEASE TRANSDERMAL at 12:23

## 2017-12-14 RX ADMIN — FENTANYL CITRATE 100 MCG: 50 INJECTION INTRAMUSCULAR; INTRAVENOUS at 13:08

## 2017-12-14 RX ADMIN — PROPOFOL 200 MG: 10 INJECTION, EMULSION INTRAVENOUS at 13:20

## 2017-12-14 RX ADMIN — NEOSTIGMINE METHYLSULFATE 5 MG: 1 INJECTION, SOLUTION INTRAMUSCULAR; INTRAVENOUS; SUBCUTANEOUS at 13:58

## 2017-12-14 RX ADMIN — ROCURONIUM BROMIDE 40 MG: 10 INJECTION INTRAVENOUS at 13:20

## 2017-12-14 RX ADMIN — HYDROCODONE BITARTRATE AND ACETAMINOPHEN 2 TABLET: 5; 325 TABLET ORAL at 16:08

## 2017-12-14 RX ADMIN — SODIUM CHLORIDE 125 ML/HR: 0.9 INJECTION, SOLUTION INTRAVENOUS at 12:13

## 2017-12-14 RX ADMIN — SODIUM CHLORIDE 125 ML/HR: 0.9 INJECTION, SOLUTION INTRAVENOUS at 15:03

## 2017-12-14 RX ADMIN — FENTANYL CITRATE 50 MCG: 50 INJECTION INTRAMUSCULAR; INTRAVENOUS at 14:41

## 2017-12-14 RX ADMIN — GLYCOPYRROLATE 0.8 MG: 0.2 INJECTION, SOLUTION INTRAMUSCULAR; INTRAVENOUS at 13:58

## 2017-12-14 RX ADMIN — CEFAZOLIN SODIUM 1000 MG: 1 SOLUTION INTRAVENOUS at 13:09

## 2017-12-14 RX ADMIN — MIDAZOLAM HYDROCHLORIDE 2 MG: 1 INJECTION, SOLUTION INTRAMUSCULAR; INTRAVENOUS at 13:07

## 2017-12-14 RX ADMIN — ONDANSETRON HYDROCHLORIDE 4 MG: 2 INJECTION, SOLUTION INTRAVENOUS at 13:42

## 2017-12-14 RX ADMIN — IOHEXOL 11 ML: 240 INJECTION, SOLUTION INTRATHECAL; INTRAVASCULAR; INTRAVENOUS; ORAL at 13:30

## 2017-12-14 RX ADMIN — FENTANYL CITRATE 50 MCG: 50 INJECTION INTRAMUSCULAR; INTRAVENOUS at 13:20

## 2017-12-14 NOTE — OP NOTE
OPERATIVE REPORT  PATIENT NAME: Daniele Cervantes    :  1975  MRN: [id number]  Pt Location: [facility] OR ROOM 02    SURGERY DATE: 2017    Surgeon(s) and Role:     * Phineas Boast, MD - Primary    Preop Diagnosis:  Calculus of ureter [N20 1]    Post-Op Diagnosis Codes:     * Calculus of ureter [N20 1]    Procedure(s) (LRB):  CYSTOSCOPY, LEFT RETROGRADE PYELOGRAM LEFT URETEROSCOPY,  LEFT STENT INSERTION (Left)    Specimen(s):  * No specimens in log *    Estimated Blood Loss:   Minimal    Drains:       Anesthesia Type:   General    Operative Indications:  Calculus of ureter [n20 1]  Left ureteral strictures    Operative Findings: There are 2 narrowings in the proximal left ureter, neither of which would allow the scope to pass up or the stone to pass down  Complications:   None    Procedure and Technique:  The patient was brought to the operating room identified  A general anesthetic was administered  The patient was placed in the lithotomy position prepped and draped in sterile fashion  A time-out was performed  A 22 Upper sorbian cystoscope sheath-30 degree telescope was inserted under direct vision  The urethra was normal   The bladder mucosa was normal   There was no trabeculation  Both orifices were normal anatomic position  A 0 035 inch Min-coated wire was inserted up the left ureter to the level of the kidney under fluoroscopic control  A ureteral dilatation balloon (10 cm x 4 mm) was position in the distal ureter and inflated  The balloon was then deflated and removed leaving the wire in place  The cystoscope was removed  The rigid ureteral scope was now inserted without difficulty  This advanced approximately L3  The scope could not be advanced further due to a visible narrowing in the ureter  Contrast was injected to perform retrograde pyelogram and this disclosed to narrowings with a fusiform dilatation between  These were located in the upper 3rd of the ureter    Either of these would allow any ureteral scope to pass from below or the stone to pass from above  The ureteral scope was removed  The wire was backloaded through the cystoscope  The cystoscope was reinserted and the stent was passed over the wire  Three were present in the renal pelvis and 1 coil in the urinary bladder  Final fluoroscopic images confirmed stent position  The cystoscope was removed  The procedure was completed  The patient tolerated well and was sent to recovery in satisfactory condition  There was no blood loss  The patient will require return to the operating room in approximately 2 weeks  At that time I recommend he have a flexible ureteroscopy with laser lithotripsy        I was present for the entire procedure    Patient Disposition:  PACU     SIGNATURE: Guanako Chávez MD  DATE: December 14, 2017  TIME: 2:10 PM

## 2017-12-14 NOTE — ANESTHESIA PREPROCEDURE EVALUATION
Review of Systems/Medical History      History of anesthetic complications PONV    Cardiovascular  Negative cardio ROS    Pulmonary  Negative pulmonary ROS ,        GI/Hepatic  Negative GI/hepatic ROS     Comment: Gastroparesis     Kidney stones,        Endo/Other  Diabetes well controlled type 1 Insulin, History of thyroid disease , hypothyroidism,   Comment: Insulin pump set at 1  2units per hour   GYN    Hysterectomy,        Hematology  Negative hematology ROS      Musculoskeletal  Negative musculoskeletal ROS        Neurology  Negative neurology ROS      Psychology   Anxiety, Depression , being treated for depression,            Physical Exam    Airway    Mallampati score: II  TM Distance: >3 FB  Neck ROM: full     Dental       Cardiovascular  Comment: Negative ROS, Rhythm: regular, Rate: normal,     Pulmonary  Pulmonary exam normal Breath sounds clear to auscultation,     Other Findings        Anesthesia Plan  ASA Score- 3       Anesthesia Type- general with ASA Monitors  Additional Monitors:   Airway Plan:     Comment: Gastroparesis consider RSI  Induction- rapid sequence induction and intravenous        Informed Consent-

## 2017-12-14 NOTE — DISCHARGE INSTRUCTIONS
Urethral Stent Placement   WHAT YOU NEED TO KNOW:   Urethral stent placement is a procedure to open a blockage or stricture (narrowing) of your urethra  The urethra is the tube that carries urine from your bladder out of your body  A stent is a small plastic or metal tube used to open your narrowed urethra  A urethral stent may stay in for a short or long period of time  DISCHARGE INSTRUCTIONS:   Medicines:   · Antibiotics: This medicine is given to help treat or prevent an infection caused by bacteria  · Pain medicine: You may be given a prescription medicine to decrease pain  Do not wait until the pain is severe before you take this medicine  · Take your medicine as directed  Contact your healthcare provider if you think your medicine is not helping or if you have side effects  Tell him or her if you are allergic to any medicine  Keep a list of the medicines, vitamins, and herbs you take  Include the amounts, and when and why you take them  Bring the list or the pill bottles to follow-up visits  Carry your medicine list with you in case of an emergency  Follow up with your healthcare provider or urologist as directed: You may need more tests or procedures  Write down your questions so you remember to ask them during your visits  Activity:  Ask when it is okay for you to return to work or school, have sex, or do your usual activities  Contact your healthcare provider or urologist if:   · You have pain in your lower abdomen  · You feel like you need to urinate more often than usual     · You have pain in the area between your anus and your genitals  · You have pain with an erection  · You have pain during or after sex  · You have questions or concerns about your condition or care  Seek care immediately or call 911 if:   · You have a fever and chills  · You have blood or discharge in your urine      · You have severe pain between your ribs and hips or in your lower back     · You have trouble urinating, or pain when you urinate  © 2017 [address] Information is for End User's use only and may not be sold, redistributed or otherwise used for commercial purposes  All illustrations and images included in CareNotes® are the copyrighted property of [employer]  or [employer]  The above information is an  only  It is not intended as medical advice for individual conditions or treatments  Talk to your doctor, nurse or pharmacist before following any medical regimen to see if it is safe and effective for you

## 2017-12-15 ENCOUNTER — GENERIC CONVERSION - ENCOUNTER (OUTPATIENT)
Dept: OTHER | Facility: OTHER | Age: 42
End: 2017-12-15

## 2017-12-19 ENCOUNTER — ALLSCRIPTS OFFICE VISIT (OUTPATIENT)
Dept: OTHER | Facility: OTHER | Age: 42
End: 2017-12-19

## 2017-12-19 LAB
BILIRUB UR QL STRIP: NEGATIVE
CLARITY UR: NORMAL
COLOR UR: NORMAL
GLUCOSE (HISTORICAL): NEGATIVE
HGB UR QL STRIP.AUTO: NORMAL
KETONES UR STRIP-MCNC: NEGATIVE MG/DL
LEUKOCYTE ESTERASE UR QL STRIP: NORMAL
NITRITE UR QL STRIP: NEGATIVE
PH UR STRIP.AUTO: 5.5 [PH]
PROT UR STRIP-MCNC: NORMAL MG/DL
SP GR UR STRIP.AUTO: >=1.03
UROBILINOGEN UR QL STRIP.AUTO: 0.2

## 2017-12-26 NOTE — PRE-PROCEDURE INSTRUCTIONS
Pre-Surgery Instructions:   Medication Instructions    ALPRAZolam (XANAX) 0 5 mg tablet Patient was instructed by Physician and understands   atorvastatin (LIPITOR) 20 mg tablet Patient was instructed by Physician and understands   Cetirizine HCl 10 MG CAPS Patient was instructed by Physician and understands   gabapentin (NEURONTIN) 100 mg capsule Patient was instructed by Physician and understands   insulin glulisine (APIDRA) 100 units/mL Patient was instructed by Physician and understands   levothyroxine 88 mcg tablet Patient was instructed by Physician and understands   metoprolol succinate (TOPROL-XL) 25 mg 24 hr tablet Patient was instructed by Physician and understands   montelukast (SINGULAIR) 10 mg tablet Patient was instructed by Physician and understands   SERTRALINE HCL PO Patient was instructed by Physician and understands  [facility] preop instructions reviewed with pt  Pt has surgical soap

## 2017-12-27 ENCOUNTER — ANESTHESIA EVENT (OUTPATIENT)
Dept: PERIOP | Facility: HOSPITAL | Age: 42
End: 2017-12-27
Payer: COMMERCIAL

## 2017-12-28 ENCOUNTER — HOSPITAL ENCOUNTER (OUTPATIENT)
Facility: HOSPITAL | Age: 42
Setting detail: OUTPATIENT SURGERY
Discharge: HOME | End: 2017-12-28
Attending: UROLOGY | Admitting: UROLOGY
Payer: COMMERCIAL

## 2017-12-28 ENCOUNTER — GENERIC CONVERSION - ENCOUNTER (OUTPATIENT)
Dept: OTHER | Facility: OTHER | Age: 42
End: 2017-12-28

## 2017-12-28 ENCOUNTER — HOSPITAL ENCOUNTER (OUTPATIENT)
Dept: RADIOLOGY | Facility: HOSPITAL | Age: 42
Setting detail: OUTPATIENT SURGERY
Discharge: HOME | End: 2017-12-28
Payer: COMMERCIAL

## 2017-12-28 ENCOUNTER — ANESTHESIA (OUTPATIENT)
Dept: PERIOP | Facility: HOSPITAL | Age: 42
End: 2017-12-28
Payer: COMMERCIAL

## 2017-12-28 VITALS
SYSTOLIC BLOOD PRESSURE: 103 MMHG | RESPIRATION RATE: 16 BRPM | HEIGHT: 63 IN | HEART RATE: 79 BPM | DIASTOLIC BLOOD PRESSURE: 55 MMHG | TEMPERATURE: 99 F | WEIGHT: 170 LBS | OXYGEN SATURATION: 98 % | BODY MASS INDEX: 30.12 KG/M2

## 2017-12-28 DIAGNOSIS — N20.1 CALCULUS OF URETER: ICD-10-CM

## 2017-12-28 LAB
GLUCOSE SERPL-MCNC: 124 MG/DL (ref 65–140)
GLUCOSE SERPL-MCNC: 162 MG/DL (ref 65–140)

## 2017-12-28 PROCEDURE — C1769 GUIDE WIRE: HCPCS | Performed by: UROLOGY

## 2017-12-28 PROCEDURE — C1758 CATHETER, URETERAL: HCPCS | Performed by: UROLOGY

## 2017-12-28 PROCEDURE — 82948 REAGENT STRIP/BLOOD GLUCOSE: CPT

## 2017-12-28 PROCEDURE — 76000 FLUOROSCOPY <1 HR PHYS/QHP: CPT

## 2017-12-28 PROCEDURE — C2625 STENT, NON-COR, TEM W/DEL SY: HCPCS | Performed by: UROLOGY

## 2017-12-28 PROCEDURE — C1894 INTRO/SHEATH, NON-LASER: HCPCS | Performed by: UROLOGY

## 2017-12-28 DEVICE — STENT KWART RETRO INJECT SET 6FR 145CM: Type: IMPLANTABLE DEVICE | Site: URETER | Status: FUNCTIONAL

## 2017-12-28 RX ORDER — MIDAZOLAM HYDROCHLORIDE 1 MG/ML
INJECTION INTRAMUSCULAR; INTRAVENOUS AS NEEDED
Status: DISCONTINUED | OUTPATIENT
Start: 2017-12-28 | End: 2017-12-28 | Stop reason: SURG

## 2017-12-28 RX ORDER — OXYBUTYNIN CHLORIDE 5 MG/1
5 TABLET ORAL 3 TIMES DAILY
Qty: 20 TABLET | Refills: 0 | Status: SHIPPED | OUTPATIENT
Start: 2017-12-28 | End: 2018-01-02

## 2017-12-28 RX ORDER — METOPROLOL SUCCINATE 25 MG/1
25 TABLET, EXTENDED RELEASE ORAL EVERY EVENING
Status: CANCELLED | OUTPATIENT
Start: 2017-12-28

## 2017-12-28 RX ORDER — FENTANYL CITRATE/PF 50 MCG/ML
50 SYRINGE (ML) INJECTION
Status: DISCONTINUED | OUTPATIENT
Start: 2017-12-28 | End: 2017-12-28 | Stop reason: HOSPADM

## 2017-12-28 RX ORDER — MONTELUKAST SODIUM 10 MG/1
10 TABLET ORAL
Status: CANCELLED | OUTPATIENT
Start: 2017-12-28

## 2017-12-28 RX ORDER — METOPROLOL TARTRATE 5 MG/5ML
2.5 INJECTION INTRAVENOUS ONCE
Status: COMPLETED | OUTPATIENT
Start: 2017-12-28 | End: 2017-12-28

## 2017-12-28 RX ORDER — SUCCINYLCHOLINE CHLORIDE 20 MG/ML
INJECTION INTRAMUSCULAR; INTRAVENOUS AS NEEDED
Status: DISCONTINUED | OUTPATIENT
Start: 2017-12-28 | End: 2017-12-28 | Stop reason: SURG

## 2017-12-28 RX ORDER — LEVOTHYROXINE SODIUM 88 UG/1
88 TABLET ORAL DAILY
Status: CANCELLED | OUTPATIENT
Start: 2017-12-28

## 2017-12-28 RX ORDER — ONDANSETRON 2 MG/ML
4 INJECTION INTRAMUSCULAR; INTRAVENOUS ONCE AS NEEDED
Status: DISCONTINUED | OUTPATIENT
Start: 2017-12-28 | End: 2017-12-28 | Stop reason: HOSPADM

## 2017-12-28 RX ORDER — LIDOCAINE HYDROCHLORIDE 10 MG/ML
INJECTION, SOLUTION INFILTRATION; PERINEURAL AS NEEDED
Status: DISCONTINUED | OUTPATIENT
Start: 2017-12-28 | End: 2017-12-28 | Stop reason: SURG

## 2017-12-28 RX ORDER — ALPRAZOLAM 0.5 MG/1
0.5 TABLET ORAL 2 TIMES DAILY PRN
Status: CANCELLED | OUTPATIENT
Start: 2017-12-28

## 2017-12-28 RX ORDER — METOCLOPRAMIDE HYDROCHLORIDE 5 MG/ML
INJECTION INTRAMUSCULAR; INTRAVENOUS AS NEEDED
Status: DISCONTINUED | OUTPATIENT
Start: 2017-12-28 | End: 2017-12-28 | Stop reason: SURG

## 2017-12-28 RX ORDER — ATORVASTATIN CALCIUM 20 MG/1
20 TABLET, FILM COATED ORAL EVERY EVENING
Status: CANCELLED | OUTPATIENT
Start: 2017-12-28

## 2017-12-28 RX ORDER — SODIUM CHLORIDE 9 MG/ML
125 INJECTION, SOLUTION INTRAVENOUS CONTINUOUS
Status: DISCONTINUED | OUTPATIENT
Start: 2017-12-28 | End: 2017-12-28 | Stop reason: HOSPADM

## 2017-12-28 RX ORDER — HYDROCODONE BITARTRATE AND ACETAMINOPHEN 5; 325 MG/1; MG/1
1 TABLET ORAL EVERY 6 HOURS PRN
Status: DISCONTINUED | OUTPATIENT
Start: 2017-12-28 | End: 2017-12-28 | Stop reason: HOSPADM

## 2017-12-28 RX ORDER — ONDANSETRON 2 MG/ML
INJECTION INTRAMUSCULAR; INTRAVENOUS AS NEEDED
Status: DISCONTINUED | OUTPATIENT
Start: 2017-12-28 | End: 2017-12-28 | Stop reason: SURG

## 2017-12-28 RX ORDER — DEXTROSE MONOHYDRATE 25 G/50ML
INJECTION, SOLUTION INTRAVENOUS AS NEEDED
Status: DISCONTINUED | OUTPATIENT
Start: 2017-12-28 | End: 2017-12-28 | Stop reason: SURG

## 2017-12-28 RX ORDER — GABAPENTIN 100 MG/1
200 CAPSULE ORAL
Status: CANCELLED | OUTPATIENT
Start: 2017-12-28

## 2017-12-28 RX ORDER — HYDROCODONE BITARTRATE AND ACETAMINOPHEN 5; 325 MG/1; MG/1
1 TABLET ORAL EVERY 6 HOURS PRN
COMMUNITY
End: 2018-01-02

## 2017-12-28 RX ORDER — HYDROCODONE BITARTRATE AND ACETAMINOPHEN 5; 325 MG/1; MG/1
1 TABLET ORAL EVERY 6 HOURS PRN
Qty: 15 TABLET | Refills: 0 | Status: SHIPPED | OUTPATIENT
Start: 2017-12-28 | End: 2018-01-07

## 2017-12-28 RX ORDER — FENTANYL CITRATE 50 UG/ML
INJECTION, SOLUTION INTRAMUSCULAR; INTRAVENOUS AS NEEDED
Status: DISCONTINUED | OUTPATIENT
Start: 2017-12-28 | End: 2017-12-28 | Stop reason: SURG

## 2017-12-28 RX ORDER — SCOLOPAMINE TRANSDERMAL SYSTEM 1 MG/1
1 PATCH, EXTENDED RELEASE TRANSDERMAL ONCE AS NEEDED
Status: DISCONTINUED | OUTPATIENT
Start: 2017-12-28 | End: 2017-12-28 | Stop reason: HOSPADM

## 2017-12-28 RX ORDER — SERTRALINE HYDROCHLORIDE 100 MG/1
200 TABLET, FILM COATED ORAL DAILY
Status: CANCELLED | OUTPATIENT
Start: 2017-12-28

## 2017-12-28 RX ORDER — MEPERIDINE HYDROCHLORIDE 50 MG/ML
12.5 INJECTION INTRAMUSCULAR; INTRAVENOUS; SUBCUTANEOUS ONCE AS NEEDED
Status: DISCONTINUED | OUTPATIENT
Start: 2017-12-28 | End: 2017-12-28 | Stop reason: HOSPADM

## 2017-12-28 RX ORDER — MAGNESIUM HYDROXIDE 1200 MG/15ML
LIQUID ORAL AS NEEDED
Status: DISCONTINUED | OUTPATIENT
Start: 2017-12-28 | End: 2017-12-28 | Stop reason: HOSPADM

## 2017-12-28 RX ORDER — ROCURONIUM BROMIDE 10 MG/ML
INJECTION, SOLUTION INTRAVENOUS AS NEEDED
Status: DISCONTINUED | OUTPATIENT
Start: 2017-12-28 | End: 2017-12-28 | Stop reason: SURG

## 2017-12-28 RX ORDER — ONDANSETRON 4 MG/1
4 TABLET, ORALLY DISINTEGRATING ORAL EVERY 6 HOURS PRN
Status: DISCONTINUED | OUTPATIENT
Start: 2017-12-28 | End: 2017-12-28 | Stop reason: HOSPADM

## 2017-12-28 RX ORDER — PROPOFOL 10 MG/ML
INJECTION, EMULSION INTRAVENOUS AS NEEDED
Status: DISCONTINUED | OUTPATIENT
Start: 2017-12-28 | End: 2017-12-28 | Stop reason: SURG

## 2017-12-28 RX ADMIN — LIDOCAINE HYDROCHLORIDE 50 MG: 10 INJECTION, SOLUTION INFILTRATION; PERINEURAL at 10:36

## 2017-12-28 RX ADMIN — PROPOFOL 200 MG: 10 INJECTION, EMULSION INTRAVENOUS at 10:36

## 2017-12-28 RX ADMIN — FENTANYL CITRATE 50 MCG: 50 INJECTION INTRAMUSCULAR; INTRAVENOUS at 12:25

## 2017-12-28 RX ADMIN — SUCCINYLCHOLINE CHLORIDE 100 MG: 20 INJECTION, SOLUTION INTRAMUSCULAR; INTRAVENOUS at 10:36

## 2017-12-28 RX ADMIN — FENTANYL CITRATE 50 MCG: 50 INJECTION INTRAMUSCULAR; INTRAVENOUS at 10:52

## 2017-12-28 RX ADMIN — FENTANYL CITRATE 50 MCG: 50 INJECTION INTRAMUSCULAR; INTRAVENOUS at 11:39

## 2017-12-28 RX ADMIN — MIDAZOLAM HYDROCHLORIDE 4 MG: 1 INJECTION, SOLUTION INTRAMUSCULAR; INTRAVENOUS at 10:28

## 2017-12-28 RX ADMIN — ONDANSETRON HYDROCHLORIDE 4 MG: 2 INJECTION, SOLUTION INTRAVENOUS at 10:45

## 2017-12-28 RX ADMIN — SODIUM CHLORIDE: 0.9 INJECTION, SOLUTION INTRAVENOUS at 10:57

## 2017-12-28 RX ADMIN — METOCLOPRAMIDE HYDROCHLORIDE 5 MG: 5 INJECTION INTRAMUSCULAR; INTRAVENOUS at 10:45

## 2017-12-28 RX ADMIN — FENTANYL CITRATE 50 MCG: 50 INJECTION INTRAMUSCULAR; INTRAVENOUS at 12:51

## 2017-12-28 RX ADMIN — ROCURONIUM BROMIDE 10 MG: 10 INJECTION INTRAVENOUS at 10:36

## 2017-12-28 RX ADMIN — HYDROCODONE BITARTRATE AND ACETAMINOPHEN 1 TABLET: 5; 325 TABLET ORAL at 14:51

## 2017-12-28 RX ADMIN — SODIUM CHLORIDE 125 ML/HR: 0.9 INJECTION, SOLUTION INTRAVENOUS at 09:16

## 2017-12-28 RX ADMIN — ONDANSETRON 4 MG: 4 TABLET, ORALLY DISINTEGRATING ORAL at 14:51

## 2017-12-28 RX ADMIN — CEFAZOLIN SODIUM 1000 MG: 1 SOLUTION INTRAVENOUS at 10:39

## 2017-12-28 RX ADMIN — SCOPALAMINE 1 PATCH: 1 PATCH, EXTENDED RELEASE TRANSDERMAL at 09:44

## 2017-12-28 RX ADMIN — METOPROLOL TARTRATE 2.5 MG: 5 INJECTION, SOLUTION INTRAVENOUS at 09:46

## 2017-12-28 RX ADMIN — DEXTROSE MONOHYDRATE 25 ML: 500 INJECTION PARENTERAL at 11:49

## 2017-12-28 RX ADMIN — FENTANYL CITRATE 150 MCG: 50 INJECTION INTRAMUSCULAR; INTRAVENOUS at 10:33

## 2017-12-28 NOTE — DISCHARGE INSTRUCTIONS
Scopolamine (Absorbed through the skin)   Scopolamine ([id number])  Treat nausea and vomiting  Brand Name(s): Transderm Scop   There may be other brand names for this medicine  When This Medicine Should Not Be Used: You should not use this medicine if you have had an allergic reaction to scopolamine, or if you have narrow angle glaucoma  How to Use This Medicine:   Patch  · Your doctor will tell you how many patches to use, where to apply them, and how often to apply them  Do not use more patches or apply them more often than your doctor tells you to  · Read and follow the patient instructions that come with this medicine  Talk to your doctor or pharmacist if you have any questions  · To prevent motion sickness, apply the patch at least 4 hours before you need it  · Wash and dry your hands thoroughly before applying the patch  · Leave the patch in its sealed wrapper until you are ready to put it on  Tear the wrapper open carefully  NEVER CUT the wrapper or the patch with scissors  Do not use any patch that has been cut by accident  · Take the liner off the sticky side before applying  · Apply the patch to dry, hairless skin behind the ear  · If the patch is loose or falls off, apply a new patch at a different place behind the ear  · After you take off the patch, wash the place where the patch was and your hands thoroughly  · Only one patch should be used at any time  If a dose is missed:   · If you forget to wear or change a patch, put one on as soon as you can  If it is almost time to put on your next patch, wait until then to apply a new patch and skip the one you missed  Do not apply extra patches to make up for a missed dose  How to Store and Dispose of This Medicine:   · Store the patches at room temperature in a closed container, away from heat, moisture, and direct light  · Fold the used patch in half with the sticky sides together   Throw any used patch away so that children or pets cannot get to it  You will also need to throw away old patches after the expiration date has passed  · Keep all medicine out of the reach of children  Never share your medicine with anyone  Drugs and Foods to Avoid:   Ask your doctor or pharmacist before using any other medicine, including over-the-counter medicines, vitamins, and herbal products  · Tell your doctor if you use anything else that makes you sleepy  Some examples are allergy medicine, narcotic pain medicine, and alcohol  · Do not drink alcohol while you are using this medicine  Warnings While Using This Medicine:   · Make sure your doctor knows if you are pregnant or breastfeeding, or if you have glaucoma, prostate problems, trouble urinating, blocked bowels, liver disease, kidney disease, or a history of seizures or mental illness  · This medicine can cause blurring of vision and other vision problems if it comes in contact with the eyes  This medicine may also cause problems with urination  If any of these reactions occur, remove the patch and call your doctor right away  · This medicine may make you dizzy or drowsy  Avoid driving, using machines, or doing anything else that could be dangerous if you are not alert  If you plan to participate in underwater sports, this medicine may cause disorienting effects  If this is a concern for you, talk with your doctor  · This medicine may make you sweat less and cause your body to get too hot  Be careful in hot weather, when you are exercising, or if using a sauna or whirlpool  · Tell any doctor or dentist who treats you that you are using this medicine  This medicine may affect certain medical test results  · Skin burns have been reported at the patch site in several patients wearing an aluminized transdermal system during a magnetic resonance imaging scan (MRI)  Because Transderm Sc?p® contains aluminum, it is recommended to remove the system before undergoing an MRI    Possible Side Effects While Using This Medicine:   Call your doctor right away if you notice any of these side effects:  · Allergic reaction: Itching or hives, swelling in your face or hands, swelling or tingling in your mouth or throat, chest tightness, trouble breathing  · Blurred vision  · Confusion or memory loss  · Fast, slow, or uneven heartbeat  · Lightheadedness, dizziness, drowsiness, or fainting  · Seeing, hearing, or feeling things that are not there  · Severe eye pain  · Trouble urinating  If you notice these less serious side effects, talk with your doctor:   · Dry mouth  · Dry, itchy, or red eyes  · Restlessness  · Skin rash or redness  If you notice other side effects that you think are caused by this medicine, tell your doctor  Call your doctor for medical advice about side effects  You may report side effects to FDA at 9-404-FDA-3754  © 2017 [address] [employer] Information is for End User's use only and may not be sold, redistributed or otherwise used for commercial purposes  The above information is an  only  It is not intended as medical advice for individual conditions or treatments  Talk to your doctor, nurse or pharmacist before following any medical regimen to see if it is safe and effective for you  How to Stop Smoking   WHAT YOU NEED TO KNOW:   You will improve your health and the health of others around you if you stop smoking  Your risk for heart and lung disease, cancer, stroke, heart attack, and vision problems will also decrease  You can benefit from quitting no matter how long you have smoked  DISCHARGE INSTRUCTIONS:   Prepare to stop smoking:  Nicotine is a highly addictive drug found in cigarettes  Withdrawal symptoms can happen when you stop smoking and make it hard to quit  These include anxiety, depression, irritability, trouble sleeping, and increased appetite  You increase your chances of success if you prepare to quit  · Set a quit date    [place] a date that is within the next 2 weeks  Do not pick a day that you think may be stressful or busy  Write down the day or [place] it on your calender  · Tell friends and family that you plan to quit  Explain that you may have withdrawal symptoms when you try to quit  Ask them to support you  They may be able to encourage you and help reduce your stress to make it easier for you to quit  · Make a list of your reasons for quitting  Put the list somewhere you will see it every day, such as your refrigerator  You can look at the list when you have a craving  · Remove all tobacco and nicotine products from your home, car, and workplace  Also, remove anything else that will tempt you to smoke, such as lighters, matches, or ashtrays  Clean your car, home, and places at work that smell like smoke  The smell of smoke can trigger a craving  · Identify triggers that make you want to smoke  This may include activities, feelings, or people  Also write down 1 way you can deal with each of your triggers  For example, if you want to smoke as soon as you wake up, plan another activity during this time, such as exercise  · Make a plan for how you will quit  Learn about the tools that can help you quit, such as medicine, counseling, or nicotine replacement therapy  Choose at least 2 options to help you quit  Tools to help you stop smoking:   · Counseling  from a trained healthcare provider can provide you with support and skills to quit smoking  The provider will also teach you to manage your withdrawal symptoms and cravings  You may receive counseling from one counselor, in group therapy, or through phone therapy called a quit line  · Nicotine replacement therapy (NRT)  such as nicotine patches, gum, or lozenges may help reduce your nicotine cravings  You may get these without a doctor's order  Do not use e-cigarettes or smokeless tobacco in place of cigarettes or to help you quit   They still contain nicotine  · Prescription medicines  such as nasal sprays or nicotine inhalers may help reduce your withdrawal symptoms  Other medicines may also be used to reduce your urge to smoke  Ask your healthcare provider about these medicines  You may need to start certain medicines 2 weeks before your quit date for them to work well  · Hypnosis  is a practice that helps guide you through thoughts and feelings  Hypnosis may help decrease your cravings and make you more willing to quit  · Acupuncture therapy  uses very thin needles to balance energy channels in the body  This is thought to help decrease cravings and symptoms of nicotine withdrawal      · Support groups  let you talk to others who are trying to quit or have already quit  It may be helpful to speak with others about how they quit  Manage your cravings:   · Avoid situations, people, and places that tempt you to smoke  Go to nonsmoking places, such as libraries or restaurants  Understand what tempts you and try to avoid these things  · Keep your hands busy  Hold things such as a stress ball or pen  · Put candy or toothpicks in your mouth  Keep lollipops, sugarless gum, or toothpicks with you at all times  · Do not have alcohol or caffeine  These drinks may tempt you to smoke  Drink healthy liquids such as water or juice instead  · Reward yourself when you resist your cravings  Rewards will motivate you and help you stay positive  · Do an activity that distracts you from your craving  Examples include going for a walk, exercising, or cleaning  Prevent weight gain after you quit:  You may gain a few pounds after you quit smoking  It is healthier for you to gain a few pounds than to continue to smoke  The following can help you prevent weight gain:  · Eat healthy foods  These include fruits, vegetables, whole-grain breads, low-fat dairy products, beans, lean meats, and fish   Eat healthy snacks, such as low-fat yogurt, if you get hungry between meals  · Drink water before, during, and between meals  This will make your stomach feel full and help prevent you from overeating  Ask your healthcare provider how much liquid to drink each day and which liquids are best for you  · Exercise  Take a walk or do some kind of exercise every day  Ask your healthcare provider what exercise is right for you  This may help reduce your cravings and reduce stress  For support and more information:   · [employer]  Phone: 7- 831 - 378-4736  Web Address: www [employer]  © 2017 [address] Information is for End User's use only and may not be sold, redistributed or otherwise used for commercial purposes  All illustrations and images included in CareNotes® are the copyrighted property of [employer]  or [employer]  The above information is an  only  It is not intended as medical advice for individual conditions or treatments  Talk to your doctor, nurse or pharmacist before following any medical regimen to see if it is safe and effective for you

## 2017-12-28 NOTE — ANESTHESIA PREPROCEDURE EVALUATION
Review of Systems/Medical History  Patient summary reviewed  Chart reviewed  History of anesthetic complications PONV    Cardiovascular  Dysrhythmias (PVC's), ,    Pulmonary  Smoker cigarette smoker , Tobacco cessation counseling given, ,   Comment: Prn smoker       GI/Hepatic      Comment: Hx of Gastroparesis      Kidney stones,        Endo/Other  Diabetes well controlled type 1 Insulin, History of thyroid disease , hypothyroidism, Arthritis  Comment: Insulin pump   GYN       Hematology  Negative hematology ROS      Musculoskeletal  Obesity , Back pain , chronic back pain and lumbar pain,        Neurology    Diabetic neuropathy, Visual impairment (dry eyes)   Psychology   Anxiety, Depression , being treated for depression,            Physical Exam    Airway    Mallampati score: II  TM Distance: >3 FB  Neck ROM: full     Dental   Comment: Missing tooth,     Cardiovascular  Rhythm: regular, Rate: normal, Cardiovascular exam normal    Pulmonary  Pulmonary exam normal Breath sounds clear to auscultation,     Other Findings        Anesthesia Plan  ASA Score- 3     Anesthesia Type- general with ASA Monitors  Additional Monitors:   Airway Plan: ETT  Plan Factors- Patient instructed to abstain from smoking on day of procedure  Patient did not smoke on day of surgery  Induction- intravenous and rapid sequence induction  Postoperative Plan- Plan for postoperative opioid use  Planned trial extubation    Informed Consent- Anesthetic plan and risks discussed with patient  I personally reviewed this patient with the CRNA  Discussed and agreed on the Anesthesia Plan with the CRNA  Starla Marroquin

## 2017-12-29 LAB — GLUCOSE SERPL-MCNC: 68 MG/DL (ref 65–140)

## 2018-01-02 ENCOUNTER — HOSPITAL ENCOUNTER (EMERGENCY)
Facility: HOSPITAL | Age: 43
Discharge: HOME | End: 2018-01-02
Attending: EMERGENCY MEDICINE | Admitting: EMERGENCY MEDICINE
Payer: COMMERCIAL

## 2018-01-02 ENCOUNTER — APPOINTMENT (EMERGENCY)
Dept: CT IMAGING | Facility: HOSPITAL | Age: 43
End: 2018-01-02
Payer: COMMERCIAL

## 2018-01-02 ENCOUNTER — APPOINTMENT (EMERGENCY)
Dept: ULTRASOUND IMAGING | Facility: HOSPITAL | Age: 43
End: 2018-01-02
Payer: COMMERCIAL

## 2018-01-02 VITALS
TEMPERATURE: 98.8 F | DIASTOLIC BLOOD PRESSURE: 46 MMHG | WEIGHT: 170 LBS | BODY MASS INDEX: 30.11 KG/M2 | SYSTOLIC BLOOD PRESSURE: 97 MMHG | HEART RATE: 75 BPM | OXYGEN SATURATION: 94 % | RESPIRATION RATE: 18 BRPM

## 2018-01-02 DIAGNOSIS — R10.9 LEFT SIDED ABDOMINAL PAIN: ICD-10-CM

## 2018-01-02 DIAGNOSIS — G62.9 PERIPHERAL NEUROPATHY: ICD-10-CM

## 2018-01-02 DIAGNOSIS — J18.9 LEFT LOWER LOBE PNEUMONIA: Primary | ICD-10-CM

## 2018-01-02 LAB
ACETONE SERPL-MCNC: ABNORMAL MG/DL
ALBUMIN SERPL BCP-MCNC: 2.6 G/DL (ref 3.5–5)
ALP SERPL-CCNC: 105 U/L (ref 46–116)
ALT SERPL W P-5'-P-CCNC: 40 U/L (ref 12–78)
ANION GAP SERPL CALCULATED.3IONS-SCNC: 14 MMOL/L (ref 4–13)
APTT PPP: 26 SECONDS (ref 23–35)
AST SERPL W P-5'-P-CCNC: 75 U/L (ref 5–45)
BACTERIA UR QL AUTO: ABNORMAL /HPF
BASE EX.OXY STD BLDV CALC-SCNC: 62.2 % (ref 60–80)
BASE EXCESS BLDV CALC-SCNC: 0.5 MMOL/L
BASOPHILS # BLD AUTO: 0.05 THOUSANDS/ΜL (ref 0–0.1)
BASOPHILS NFR BLD AUTO: 0 % (ref 0–1)
BILIRUB SERPL-MCNC: 1.21 MG/DL (ref 0.2–1)
BILIRUB UR QL STRIP: ABNORMAL
BUN SERPL-MCNC: 20 MG/DL (ref 5–25)
CALCIUM SERPL-MCNC: 8.3 MG/DL (ref 8.3–10.1)
CHLORIDE SERPL-SCNC: 93 MMOL/L (ref 100–108)
CLARITY UR: ABNORMAL
CO2 SERPL-SCNC: 24 MMOL/L (ref 21–32)
COLOR UR: YELLOW
COLOR, POC: YELLOW
CREAT SERPL-MCNC: 1 MG/DL (ref 0.6–1.3)
EOSINOPHIL # BLD AUTO: 0.01 THOUSAND/ΜL (ref 0–0.61)
EOSINOPHIL NFR BLD AUTO: 0 % (ref 0–6)
ERYTHROCYTE [DISTWIDTH] IN BLOOD BY AUTOMATED COUNT: 13.5 % (ref 11.6–15.1)
GFR SERPL CREATININE-BSD FRML MDRD: 70 ML/MIN/1.73SQ M
GLUCOSE SERPL-MCNC: 290 MG/DL (ref 65–140)
GLUCOSE UR STRIP-MCNC: ABNORMAL MG/DL
HCO3 BLDV-SCNC: 23.8 MMOL/L (ref 24–30)
HCT VFR BLD AUTO: 32.6 % (ref 34.8–46.1)
HGB BLD-MCNC: 11.1 G/DL (ref 11.5–15.4)
HGB UR QL STRIP.AUTO: ABNORMAL
INR PPP: 1.06 (ref 0.86–1.16)
KETONES UR STRIP-MCNC: ABNORMAL MG/DL
LACTATE SERPL-SCNC: 1.6 MMOL/L (ref 0.5–2)
LEUKOCYTE ESTERASE UR QL STRIP: NEGATIVE
LYMPHOCYTES # BLD AUTO: 1.15 THOUSANDS/ΜL (ref 0.6–4.47)
LYMPHOCYTES NFR BLD AUTO: 6 % (ref 14–44)
MCH RBC QN AUTO: 31.1 PG (ref 26.8–34.3)
MCHC RBC AUTO-ENTMCNC: 34 G/DL (ref 31.4–37.4)
MCV RBC AUTO: 91 FL (ref 82–98)
MONOCYTES # BLD AUTO: 1.46 THOUSAND/ΜL (ref 0.17–1.22)
MONOCYTES NFR BLD AUTO: 8 % (ref 4–12)
NEUTROPHILS # BLD AUTO: 15.51 THOUSANDS/ΜL (ref 1.85–7.62)
NEUTS SEG NFR BLD AUTO: 86 % (ref 43–75)
NITRITE UR QL STRIP: NEGATIVE
NON-SQ EPI CELLS URNS QL MICRO: ABNORMAL /HPF
NRBC BLD AUTO-RTO: 0 /100 WBCS
O2 CT BLDV-SCNC: 10.5 ML/DL
PCO2 BLDV: 33.7 MM HG (ref 42–50)
PH BLDV: 7.47 [PH] (ref 7.3–7.4)
PH UR STRIP.AUTO: 5.5 [PH] (ref 4.5–8)
PLATELET # BLD AUTO: 253 THOUSANDS/UL (ref 149–390)
PMV BLD AUTO: 12.8 FL (ref 8.9–12.7)
PO2 BLDV: 30.2 MM HG (ref 35–45)
POTASSIUM SERPL-SCNC: 3.9 MMOL/L (ref 3.5–5.3)
PROT SERPL-MCNC: 6.6 G/DL (ref 6.4–8.2)
PROT UR STRIP-MCNC: >=300 MG/DL
PROTHROMBIN TIME: 13.8 SECONDS (ref 12.1–14.4)
RBC # BLD AUTO: 3.57 MILLION/UL (ref 3.81–5.12)
RBC #/AREA URNS AUTO: ABNORMAL /HPF
SODIUM SERPL-SCNC: 131 MMOL/L (ref 136–145)
SP GR UR STRIP.AUTO: 1.02 (ref 1–1.03)
UROBILINOGEN UR QL STRIP.AUTO: 0.2 E.U./DL
WBC # BLD AUTO: 18.18 THOUSAND/UL (ref 4.31–10.16)
WBC #/AREA URNS AUTO: ABNORMAL /HPF

## 2018-01-02 PROCEDURE — 83605 ASSAY OF LACTIC ACID: CPT | Performed by: EMERGENCY MEDICINE

## 2018-01-02 PROCEDURE — 80053 COMPREHEN METABOLIC PANEL: CPT | Performed by: EMERGENCY MEDICINE

## 2018-01-02 PROCEDURE — 85610 PROTHROMBIN TIME: CPT | Performed by: EMERGENCY MEDICINE

## 2018-01-02 PROCEDURE — 82805 BLOOD GASES W/O2 SATURATION: CPT | Performed by: EMERGENCY MEDICINE

## 2018-01-02 PROCEDURE — 82948 REAGENT STRIP/BLOOD GLUCOSE: CPT

## 2018-01-02 PROCEDURE — 74176 CT ABD & PELVIS W/O CONTRAST: CPT

## 2018-01-02 PROCEDURE — 99285 EMERGENCY DEPT VISIT HI MDM: CPT

## 2018-01-02 PROCEDURE — 76705 ECHO EXAM OF ABDOMEN: CPT

## 2018-01-02 PROCEDURE — 96375 TX/PRO/DX INJ NEW DRUG ADDON: CPT

## 2018-01-02 PROCEDURE — 82009 KETONE BODYS QUAL: CPT | Performed by: EMERGENCY MEDICINE

## 2018-01-02 PROCEDURE — 81001 URINALYSIS AUTO W/SCOPE: CPT

## 2018-01-02 PROCEDURE — 85025 COMPLETE CBC W/AUTO DIFF WBC: CPT | Performed by: EMERGENCY MEDICINE

## 2018-01-02 PROCEDURE — 87040 BLOOD CULTURE FOR BACTERIA: CPT | Performed by: EMERGENCY MEDICINE

## 2018-01-02 PROCEDURE — 81002 URINALYSIS NONAUTO W/O SCOPE: CPT | Performed by: EMERGENCY MEDICINE

## 2018-01-02 PROCEDURE — 85730 THROMBOPLASTIN TIME PARTIAL: CPT | Performed by: EMERGENCY MEDICINE

## 2018-01-02 PROCEDURE — 96365 THER/PROPH/DIAG IV INF INIT: CPT

## 2018-01-02 PROCEDURE — 36415 COLL VENOUS BLD VENIPUNCTURE: CPT | Performed by: EMERGENCY MEDICINE

## 2018-01-02 RX ORDER — LEVOFLOXACIN 750 MG/1
750 TABLET ORAL EVERY 24 HOURS
Qty: 7 TABLET | Refills: 0 | Status: SHIPPED | OUTPATIENT
Start: 2018-01-02 | End: 2018-01-09

## 2018-01-02 RX ORDER — FENTANYL CITRATE 50 UG/ML
50 INJECTION, SOLUTION INTRAMUSCULAR; INTRAVENOUS
Status: DISCONTINUED | OUTPATIENT
Start: 2018-01-02 | End: 2018-01-02 | Stop reason: HOSPADM

## 2018-01-02 RX ORDER — LIDOCAINE 50 MG/G
1 PATCH TOPICAL DAILY
Qty: 6 PATCH | Refills: 0 | Status: SHIPPED | OUTPATIENT
Start: 2018-01-02 | End: 2018-10-03

## 2018-01-02 RX ORDER — LEVOFLOXACIN 5 MG/ML
750 INJECTION, SOLUTION INTRAVENOUS ONCE
Status: COMPLETED | OUTPATIENT
Start: 2018-01-02 | End: 2018-01-02

## 2018-01-02 RX ORDER — LIDOCAINE 50 MG/G
1 PATCH TOPICAL ONCE
Status: DISCONTINUED | OUTPATIENT
Start: 2018-01-02 | End: 2018-01-02 | Stop reason: HOSPADM

## 2018-01-02 RX ORDER — ONDANSETRON 2 MG/ML
4 INJECTION INTRAMUSCULAR; INTRAVENOUS ONCE
Status: COMPLETED | OUTPATIENT
Start: 2018-01-02 | End: 2018-01-02

## 2018-01-02 RX ORDER — GABAPENTIN 100 MG/1
200 CAPSULE ORAL ONCE
Status: COMPLETED | OUTPATIENT
Start: 2018-01-02 | End: 2018-01-02

## 2018-01-02 RX ORDER — HYDROCODONE BITARTRATE AND ACETAMINOPHEN 5; 325 MG/1; MG/1
1 TABLET ORAL EVERY 4 HOURS PRN
Qty: 20 TABLET | Refills: 0 | Status: SHIPPED | OUTPATIENT
Start: 2018-01-02 | End: 2018-01-12

## 2018-01-02 RX ORDER — ACETAMINOPHEN 325 MG/1
650 TABLET ORAL ONCE
Status: COMPLETED | OUTPATIENT
Start: 2018-01-02 | End: 2018-01-02

## 2018-01-02 RX ORDER — ONDANSETRON 4 MG/1
4 TABLET, ORALLY DISINTEGRATING ORAL EVERY 6 HOURS PRN
Qty: 20 TABLET | Refills: 0 | Status: SHIPPED | OUTPATIENT
Start: 2018-01-02 | End: 2018-10-03

## 2018-01-02 RX ADMIN — FENTANYL CITRATE 50 MCG: 50 INJECTION INTRAMUSCULAR; INTRAVENOUS at 12:03

## 2018-01-02 RX ADMIN — GABAPENTIN 200 MG: 100 CAPSULE ORAL at 12:32

## 2018-01-02 RX ADMIN — ONDANSETRON 4 MG: 2 INJECTION INTRAMUSCULAR; INTRAVENOUS at 12:01

## 2018-01-02 RX ADMIN — SODIUM CHLORIDE 1000 ML: 0.9 INJECTION, SOLUTION INTRAVENOUS at 12:01

## 2018-01-02 RX ADMIN — LEVOFLOXACIN 750 MG: 5 INJECTION, SOLUTION INTRAVENOUS at 12:08

## 2018-01-02 RX ADMIN — ACETAMINOPHEN 650 MG: 325 TABLET, FILM COATED ORAL at 11:12

## 2018-01-02 NOTE — ED NOTES
Two attempts for IV access  Was unsuccessful  Initial attempt made in patient's RAC using anatomical ladnmarks  No flash obtained  US guidance used on second attempt in LAC, flash obtained but was unable to thread IV  Josefina Rice, RN at bedside to attempt        Wilma Bradley RN  01/02/18 1145

## 2018-01-02 NOTE — ED ATTENDING ATTESTATION
Brandy Echevarria DO, saw and evaluated the patient  I have discussed the patient with the resident/non-physician practitioner and agree with the resident's/non-physician practitioner's findings, Plan of Care, and MDM as documented in the resident's/non-physician practitioner's note, except where noted  All available labs and Radiology studies were reviewed  At this point I agree with the current assessment done in the Emergency Department  I have conducted an independent evaluation of this patient a history and physical is as follows:    46-year-old female presents for evaluation of fever, chills, left flank pain that has been ongoing for several days and worsening since the patient had a ureteral stent put in  The patient has had fevers up to 104 at home which had been temporarily responsive to antipyretic medications  The patient has associated nausea, 2 episodes of vomiting, abdominal and flank pain  The patient also complains of some headache and neck pain  The patient has been increasingly tachypneic today  She has not been eating or drinking nor taking any of her medications due to her pain and illness  The  reports that the patient's blood sugars have been elevated today and he noted ketones in her urine when tested at home  On exam, the patient is listless and in mild distress  She is mildly tachypneic with shallow breathing, heart is tachycardic, regular  Her mucous membranes are dry  There are positive bowel sounds, the abdomen is diffusely tender without rebound or guarding  Her neuro exam is grossly nonfocal     Plan is to evaluate the patient for sepsis with the presumed source of being a urinary tract infection or kidney infection  I will also begin fluid resuscitation as I get concerned about DKA in this patient may be septic      On examination:  The patient is awake, alert and oriented  HEENT: Normocephalic/atraumatic  External examination of the ears is unremarkable  Pupils are equal round and reactive to light, there is no conjunctival injection or scleral icterus noted  Nares are patent without rhinorrhea  The oropharynx is moist without injection  The neck is supple  Lungs: Clear to auscultation bilaterally, tachypneic  Heart: Regular without murmurs rubs or gallops  Abdomen: Soft left-sided upper abdominal tenderness  There are positive bowel sounds  there is no rebound or guarding  Musculoskeletal: Normal range of motion with grossly normal strength  Neuro: Cranial nerves II through XII grossly intact  Nonfocal exam  Skin: No rash noted  Psych: Mood and affect normal      15:55 patient improved upon re-eval after CT, labs, and RUQ US  Discussed likely diagnosis of pneumonia plan for coverage with Levaquin as this will also cover  etiology  Discussed plan for close outpatient follow-up with Urology as well as primary care  US right upper quadrant   Final Result   Mild gallbladder wall thickening likely secondary to underdistention and similar to prior CT study  No cholelithiasis, choledocholithiasis, pericholecystic fluid or dilatation of the CBD  Workstation performed: [id number]         CT abdomen pelvis wo contrast   Final Result      1  No hydronephrosis  Left nephroureteral stent with resolution of previous ureteral calculus  Nonobstructing left renal calculus identified  2   Patchy density at the lung bases with small left pleural effusion  This may represent atelectasis though pneumonia is not excluded  3   Mild gallbladder wall thickening, not present previously though the gallbladder is underdistended  If there is clinical concern for gallbladder pathology, ultrasound is recommended  4   Atherosclerosis out of proportion to the patient's age  5   3 mm left lower lobe lung nodule           Based on current Fleischner Society 2017 Guidelines on incidental pulmonary nodule, no routine follow-up is needed if the patient is considered low risk for lung cancer  If the patient is considered high    risk for lung cancer, 12 month follow-up non-contrast chest CT is recommended           Workstation performed: [id number]           Results for orders placed or performed during the hospital encounter of 01/02/18   APTT   Result Value Ref Range    PTT 26 23 - 35 seconds   Protime-INR   Result Value Ref Range    Protime 13 8 12 1 - 14 4 seconds    INR 1 06 0 86 - 1 16   CBC and differential   Result Value Ref Range    WBC 18 18 (H) 4 31 - 10 16 Thousand/uL    RBC 3 57 (L) 3 81 - 5 12 Million/uL    Hemoglobin 11 1 (L) 11 5 - 15 4 g/dL    Hematocrit 32 6 (L) 34 8 - 46 1 %    MCV 91 82 - 98 fL    MCH 31 1 26 8 - 34 3 pg    MCHC 34 0 31 4 - 37 4 g/dL    RDW 13 5 11 6 - 15 1 %    MPV 12 8 (H) 8 9 - 12 7 fL    Platelets 835 436 - 102 Thousands/uL    nRBC 0 /100 WBCs    Neutrophils Relative 86 (H) 43 - 75 %    Lymphocytes Relative 6 (L) 14 - 44 %    Monocytes Relative 8 4 - 12 %    Eosinophils Relative 0 0 - 6 %    Basophils Relative 0 0 - 1 %    Neutrophils Absolute 15 51 (H) 1 85 - 7 62 Thousands/µL    Lymphocytes Absolute 1 15 0 60 - 4 47 Thousands/µL    Monocytes Absolute 1 46 (H) 0 17 - 1 22 Thousand/µL    Eosinophils Absolute 0 01 0 00 - 0 61 Thousand/µL    Basophils Absolute 0 05 0 00 - 0 10 Thousands/µL   Comprehensive metabolic panel   Result Value Ref Range    Sodium 131 (L) 136 - 145 mmol/L    Potassium 3 9 3 5 - 5 3 mmol/L    Chloride 93 (L) 100 - 108 mmol/L    CO2 24 21 - 32 mmol/L    Anion Gap 14 (H) 4 - 13 mmol/L    BUN 20 5 - 25 mg/dL    Creatinine 1 00 0 60 - 1 30 mg/dL    Glucose 290 (H) 65 - 140 mg/dL    Calcium 8 3 8 3 - 10 1 mg/dL    AST 75 (H) 5 - 45 U/L    ALT 40 12 - 78 U/L    Alkaline Phosphatase 105 46 - 116 U/L    Total Protein 6 6 6 4 - 8 2 g/dL    Albumin 2 6 (L) 3 5 - 5 0 g/dL    Total Bilirubin 1 21 (H) 0 20 - 1 00 mg/dL    eGFR 70 ml/min/1 73sq m   Lactic acid, plasma   Result Value Ref Range    LACTIC ACID 1 6 0 5 - 2 0 mmol/L   Acetone   Result Value Ref Range    Acetone, Bld Trace (A) Negative   Blood gas, venous   Result Value Ref Range    pH, [place] 7 466 (H) 7 300 - 7 400    pCO2, Barber 33 7 (L) 42 0 - 50 0 mm Hg    pO2, Barber 30 2 (L) 35 0 - 45 0 mm Hg    HCO3, Barber 23 8 (L) 24 - 30 mmol/L    Base Excess, Barber 0 5 mmol/L    O2 Content, Barber 10 5 ml/dL    O2 HGB, VENOUS 62 2 60 0 - 80 0 %   Urine Microscopic   Result Value Ref Range    RBC, UA 30-50 (A) None Seen, 0-5 /hpf    WBC, UA 4-10 (A) None Seen, 0-5, 5-55, 5-65 /hpf    Epithelial Cells Occasional None Seen, Occasional /hpf    Bacteria, UA Moderate (A) None Seen, Occasional /hpf   POCT urinalysis dipstick   Result Value Ref Range    Color, UA yellow    ED Urine Macroscopic   Result Value Ref Range    Color, UA Yellow     Clarity, UA Slightly Cloudy     pH, UA 5 5 4 5 - 8 0    Leukocytes, UA Negative Negative    Nitrite, UA Negative Negative    Protein, UA >=300 (A) Negative mg/dl    Glucose,  (1/2%) (A) Negative mg/dl    Ketones, UA >=160 (4+) (A) Negative mg/dl    Urobilinogen, UA 0 2 0 2, 1 0 E U /dl E U /dl    Bilirubin, UA Interference- unable to analyze (A) Negative    Blood, UA Large (A) Negative    Specific Gravity, UA 1 025 1 003 - 1 030     Patient improved upon re-evaluation in the plan is for discharge on oral Levaquin with close outpatient primary care Urology follow-up  Strict return precautions were discussed  The patient's Genevia Punches were lysed understanding of the discharge instructions and warnings, all questions were answered prior to discharge      Diagnosis left lower lobe pneumonia    Critical Care Time  CritCare Time    Procedures

## 2018-01-02 NOTE — DISCHARGE INSTRUCTIONS
Please take your antibiotic (Levaquin) to completion for treatment of pneumonia  Please use the lidocaine patch and vicodin as directed for pain management  Zofran has been prescribed for management of nausea  Bacterial Pneumonia   WHAT YOU NEED TO KNOW:   Bacterial pneumonia is a lung infection caused by bacteria  It makes your lungs inflamed, which means they cannot work well  Bacterial pneumonia germs are easily spread when an infected person coughs, sneezes, or has close contact with others  DISCHARGE INSTRUCTIONS:   Return to the emergency department if:   · You are confused and cannot think clearly  · You are urinating less or not at all  · You cough up blood  · You have more trouble breathing, or your breathing seems faster than normal     · Your heart or pulse beats more than 100 times in 1 minute  · Your lips or fingernails turn blue  Contact your healthcare provider if:   · Your symptoms are the same or get worse 48 hours after you start antibiotics  · You cannot eat or have loss of appetite, nausea, or are vomiting  · You have questions or concerns about your condition or care  Medicines:   · Antibiotics  treat pneumonia caused by bacteria  · Acetaminophen  decreases pain and fever  It is available without a doctor's order  Ask how much to take and how often to take it  Follow directions  Read the labels of all other medicines you are using to see if they also contain acetaminophen, or ask your doctor or pharmacist  Acetaminophen can cause liver damage if not taken correctly  Do not use more than 4 grams (4,000 milligrams) total of acetaminophen in one day  · NSAIDs , such as ibuprofen, help decrease swelling, pain, and fever  This medicine is available with or without a doctor's order  NSAIDs can cause stomach bleeding or kidney problems in certain people  If you take blood thinner medicine, always ask your healthcare provider if NSAIDs are safe for you   Always read the medicine label and follow directions  · Take your medicine as directed  Contact your healthcare provider if you think your medicine is not helping or if you have side effects  Tell him or her if you are allergic to any medicine  Keep a list of the medicines, vitamins, and herbs you take  Include the amounts, and when and why you take them  Bring the list or the pill bottles to follow-up visits  Carry your medicine list with you in case of an emergency  Follow up with your healthcare provider as directed:  Write down your questions so you remember to ask them during your visits  Manage your symptoms:   · Rest as needed  Rest often while you recover  Slowly start to do more each day  · Drink liquids as directed  Ask how much liquid to drink each day and which liquids are best for you  Liquids help thin your mucus, which may make it easier for you to cough it up  · Do not smoke  Avoid secondhand smoke  Smoking increases your risk for pneumonia  Smoking also makes it harder for you to get better after you have had pneumonia  Ask your healthcare provider for information if you currently smoke and need help to quit  E-cigarettes or smokeless tobacco still contain nicotine  Talk to your healthcare provider before you use these products  · Use a cool mist humidifier  to increase air moisture in your home  This may make it easier for you to breathe and help decrease your cough  · Keep your head elevated  You may be able to breathe better if you lie down with the head of your bed up  Prevent bacterial pneumonia:   · Prevent the spread of germs  Wash your hands often with soap and water  Use gel hand cleanser when there is no soap and water available  Do not touch your eyes, nose, or mouth unless you have washed your hands first  Cover your mouth when you cough  Cough into a tissue or your shirtsleeve so you do not spread germs from your hands   If you are sick, stay away from others as much as possible  · Limit alcohol  Women should limit alcohol to 1 drink a day  Men should limit alcohol to 2 drinks a day  A drink of alcohol is 12 ounces of beer, 5 ounces of wine, or 1½ ounces of liquor  · Ask about vaccines  You may need a vaccine to help prevent pneumonia  Get an influenza (flu) vaccine every year as soon as it becomes available  © 2017 [address] Information is for End User's use only and may not be sold, redistributed or otherwise used for commercial purposes  All illustrations and images included in CareNotes® are the copyrighted property of [employer]  or [address]  The above information is an  only  It is not intended as medical advice for individual conditions or treatments  Talk to your doctor, nurse or pharmacist before following any medical regimen to see if it is safe and effective for you

## 2018-01-02 NOTE — ED PROVIDER NOTES
History  Chief Complaint   Patient presents with    Weakness - Generalized     pt c/o genearlized weakness, fever, and left sided back pain reports recent kindney stone removal       40year old female presents for left CVA pain and left flank pain as well as increasing fever and chills following a cystoscopy performed on 12/28  The patient has a history of kidney stones and has been to the operating room to place within the preceding month  She last had a cystoscopy and stent placement performed on 12/28  She was scheduled to have the stent removed on this coming Friday  She is currently in a diaphoretic state and is unable to relate most for history due to her pain and fevers  Most of her history was obtained from her   He states that after the stent placement on Thursday he brought her home indeed anesthesia she was little sleepy for the following day  However he became concerned when her activity level is not increased  She began to have increasing pain as well as an elevated fever  They had been using over-the-counter Tylenol to help reduce her fever  They have related that the fever has been as high as 104 degrees  They were not as concerned for the left CVA pain she has been having pain in that area due to the kidney stones, however they became increasingly concerned this morning when she had developed left flank pain in promptly presented to the emergency department for evaluation and treatment   also relates that she has been having significant fever and chills over the past few days  Prior to Admission Medications   Prescriptions Last Dose Informant Patient Reported? Taking?    ALPRAZolam (XANAX) 0 5 mg tablet  Self Yes No   Sig: Take 0 5 mg by mouth 2 (two) times a day as needed   Cetirizine HCl 10 MG CAPS   Yes No   Sig: Take 10 mg by mouth daily as needed   HYDROcodone-acetaminophen (NORCO) 5-325 mg per tablet   Yes No   Sig: Take 1 tablet by mouth every 6 (six) hours as needed for pain   HYDROcodone-acetaminophen (NORCO) 5-325 mg per tablet   No No   Sig: Take 1 tablet by mouth every 6 (six) hours as needed for pain for up to 10 days Max Daily Amount: 4 tablets   SERTRALINE HCL PO  Self Yes No   Sig: Take 200 mg by mouth daily   atorvastatin (LIPITOR) 20 mg tablet  Self Yes No   Sig: Take 20 mg by mouth every evening   gabapentin (NEURONTIN) 100 mg capsule  Self Yes No   Sig: Take 200 mg by mouth daily at bedtime as needed   insulin glulisine (APIDRA) 100 units/mL   Yes No   Sig: by Subcutaneous Insulin Pump route as needed     levothyroxine 88 mcg tablet   Yes No   Sig: Take 88 mcg by mouth daily   metoprolol succinate (TOPROL-XL) 25 mg 24 hr tablet  Self Yes No   Sig: Take 25 mg by mouth every evening   montelukast (SINGULAIR) 10 mg tablet  Self Yes No   Sig: Take 10 mg by mouth daily at bedtime   oxybutynin (DITROPAN) 5 mg tablet   No No   Sig: Take 1 tablet by mouth 3 (three) times a day      Facility-Administered Medications: None       Past Medical History:   Diagnosis Date    Anxiety     Arthritis     Chronic pain disorder     right knee, lower back    Depression     Diabetes mellitus (HCC)     Type 1 x 36yrs,,"on insulin pump for approx 17 yrs or so"    Disease of thyroid gland     hypothyroid    Dry eyes, bilateral     Fatigue     History of UTI     Kidney stone     Motion sickness     Nausea     Neuropathy     PONV (postoperative nausea and vomiting)     "pt reports scopalamine patch does help"    Presence of surgical incision     "small biopsy site with steri and bandaid left outer thigh per pt report"    PVC (premature ventricular contraction)     Sarcoidosis     "is currently being tested to see if has it"    Status post right partial knee replacement     right    Tooth missing     Wears glasses        Past Surgical History:   Procedure Laterality Date    APPENDECTOMY      CARPAL TUNNEL RELEASE       SECTION      CYSTOSCOPY W/ LASER LITHOTRIPSY Left 12/14/2017    Procedure: CYSTOSCOPY, LEFT RETROGRADE PYELOGRAM LEFT URETEROSCOPY,  LEFT STENT INSERTION;  Surgeon: Fitz Ford MD;  Location: [facility];  Service: Urology    FINGER SURGERY      HYSTERECTOMY      JOINT REPLACEMENT Right     partial knee    KNEE ARTHROSCOPY Right     x4    KNEE SURGERY      REMOVAL URETERAL STENT      SKIN BIOPSY Left     outer thigh    URETERAL STENT PLACEMENT      VITRECTOMY Bilateral     right eye x2 and left x1    WISDOM TOOTH EXTRACTION         History reviewed  No pertinent family history  I have reviewed and agree with the history as documented  Social History   Substance Use Topics    Smoking status: Former Smoker    Smokeless tobacco: Never Used      Comment: last had cigarette two months ago    Alcohol use Yes      Comment: occasional        Review of Systems   Constitutional: Positive for activity change, chills, diaphoresis, fatigue and fever  HENT: Negative  Eyes: Negative  Respiratory: Positive for shortness of breath  Cardiovascular: Negative  Gastrointestinal: Negative  Endocrine:        Elevated blood sugars   Genitourinary: Positive for flank pain  Negative for difficulty urinating and dysuria  Left CVA pain   Musculoskeletal: Positive for back pain  Skin: Positive for color change  Allergic/Immunologic: Negative  Neurological: Negative  Hematological: Negative  Psychiatric/Behavioral: Negative          Physical Exam  ED Triage Vitals   Temperature Pulse Respirations Blood Pressure SpO2   01/02/18 1058 01/02/18 1100 01/02/18 1100 01/02/18 1100 01/02/18 1100   (!) 101 8 °F (38 8 °C) 102 17 119/57 97 %      Temp Source Heart Rate Source Patient Position - Orthostatic VS BP Location FiO2 (%)   01/02/18 1058 01/02/18 1100 01/02/18 1100 01/02/18 1100 --   Oral Monitor Sitting Left arm       Pain Score       --                  Orthostatic Vital Signs  Vitals:    01/02/18 1100   BP: 119/57 Pulse: 102   Patient Position - Orthostatic VS: Sitting       Physical Exam   Constitutional: She is oriented to person, place, and time  She appears well-developed and well-nourished  She appears lethargic  HENT:   Head: Normocephalic and atraumatic  Eyes: Conjunctivae and EOM are normal  Pupils are equal, round, and reactive to light  Neck: Normal range of motion  Neck supple  Cardiovascular: Normal heart sounds  Tachycardia present  tachycardic   Pulmonary/Chest: Accessory muscle usage present  She is in respiratory distress  Abdominal: Soft  CVA tenderness noted to the leftft side  Left flank tenderness   Musculoskeletal: Normal range of motion  Neurological: She is oriented to person, place, and time  She appears lethargic  Skin: Skin is warm  She is diaphoretic  Vitals reviewed  ED Medications  Medications - No data to display    Diagnostic Studies  Results Reviewed     None                 No orders to display         Procedures  Procedures      Phone Consults  ED Phone Contact    ED Course  ED Course                                MDM  Number of Diagnoses or Management Options  Left lower lobe pneumonia [facility]): new and requires workup  Left sided abdominal pain: new and requires workup  Peripheral neuropathy: minor  Diagnosis management comments: CT scan did not show any signs of kidney stone, but did show patchy effusions suggesting left lower lobe pneumonia as well as mild thickening of the wall of the gallbladder  Further ultrasound of RUQ abdomen did not show any acute pathology of the gallbladder Patient did improve with fluid resuscitation, pain medication, administration of lidocane patch and initiation of antibiotics  Urine dipstick was not abnormal  Labwork consistent with pnemonia  Patient to be discharged home on course of antibiotics as well as lidocaine patch, vicodin, and zofran    She is to follow-up with her PCP as well as urology for her stent removal this Friday  Amount and/or Complexity of Data Reviewed  Clinical lab tests: ordered and reviewed  Tests in the radiology section of CPT®: ordered and reviewed  Tests in the medicine section of CPT®: ordered and reviewed    Risk of Complications, Morbidity, and/or Mortality  Presenting problems: moderate  Diagnostic procedures: moderate  Management options: moderate    Patient Progress  Patient progress: stable    CritCare Time    Disposition  Final diagnoses:   None     ED Disposition     None      Follow-up Information    None       Patient's Medications   Discharge Prescriptions    No medications on file     No discharge procedures on file  ED Provider  Attending physically available and evaluated Adria Paulino  ALVIN managed the patient along with the ED Attending      Electronically Signed by         Jia Mendoza DPM  Resident  01/02/18 1640

## 2018-01-04 ENCOUNTER — GENERIC CONVERSION - ENCOUNTER (OUTPATIENT)
Dept: OTHER | Facility: OTHER | Age: 43
End: 2018-01-04

## 2018-01-04 LAB — GLUCOSE SERPL-MCNC: 289 MG/DL (ref 65–140)

## 2018-01-07 LAB
BACTERIA BLD CULT: NORMAL
BACTERIA BLD CULT: NORMAL

## 2018-01-08 ENCOUNTER — ALLSCRIPTS OFFICE VISIT (OUTPATIENT)
Dept: OTHER | Facility: OTHER | Age: 43
End: 2018-01-08

## 2018-01-10 NOTE — MISCELLANEOUS
Message   Recorded as Task   Date: 08/07/2017 04:06 PM, Created By: Vick Arita   Task Name: Call Back   Assigned To: Marciano ValdesB,TEAM   Regarding Patient: Kirean Patel, Status: Active   Comment:    Columba Guardado - 07 Aug 2017 4:06 PM     TASK CREATED  Caller: Self; Results Inquiry; [phone] (Mobile Phone)  [address] PLEASE CALL HER BACK AT   Janice Florence - 07 Aug 2017 4:40 PM     TASK EDITED  PT NOTIFIED        Active Problems    1  Arthritis (716 90) (M19 90)   2  Asthma (493 90) (J45 909)   3  Dyslipidemia (272 4) (E78 5)   4  Hypothyroidism (244 9) (E03 9)   5  Palpitations (785 1) (R00 2)   6  Premature ventricular contractions (427 69) (I49 3)   7  Type 1 diabetes mellitus without complication (048 91) (B01 2)   8  UTI (urinary tract infection) (599 0) (N39 0)    Current Meds   1  Apidra SoloStar 100 UNIT/ML SOLN;   Therapy: (Recorded:10Oct2016) to Recorded   2  Atorvastatin Calcium 20 MG Oral Tablet; TAKE 1 TABLET DAILY  Requested for:   73SFR7162; Last Rx:06Mar2017 Ordered   3  Ciprofloxacin HCl - 500 MG Oral Tablet; Take 1 tablet twice daily; Therapy: 23JKL2920 to (Evaluate:11Aug2017)  Requested for: 80Mbo7915; Last   Rx:[id number] Ordered   4  Metoprolol Succinate ER 25 MG Oral Tablet Extended Release 24 Hour; TAKE  1/2   TABLET BY MOUTH EVERY DAY; Therapy: 74KIV2084 to (Evaluate:69Bmm3499)  Requested for: 68BVK8816; Last   Rx:[id number] Ordered   5  Singulair 10 MG Oral Tablet (Montelukast Sodium); Therapy: (Recorded:10Oct2016) to Recorded   6  Synthroid 88 MCG Oral Tablet (Levothyroxine Sodium); TAKE 1 TABLET DAILY AS   DIRECTED; Therapy: (Recorded:10Oct2016) to Recorded   7  Zoloft 100 MG Oral Tablet (Sertraline HCl); TAKE TWO TABLETS BY MOUTH DAILY; Therapy: (Recorded:10Oct2016) to Recorded    Allergies    1  Iodinated Contrast Media   2   Morphine Derivatives    Signatures   Electronically signed by : Ambar Humphreys, ; Aug  7 2017  4:40PM EST                       (Author)

## 2018-01-12 NOTE — MISCELLANEOUS
Message   Recorded as Task   Date: 08/07/2017 03:46 PM, Created By: Citlaly Hansen   Task Name: Result Follow Up   Assigned To: Marciano REYEZ,TEAM   Regarding Patient: Shant Sorto, Status: In Progress   Comment:    Shabnam Brown - 07 Aug 2017 3:46 PM     TASK CREATED  please notify patient result is neg  michael  Janice Florence - 07 Aug 2017 4:36 PM     TASK REASSIGNED: Previously Assigned To [facility] [address],TEAM  PT NOTIFIED THEN STATED SHE WAS TOLD IF THIS C&S CAME BACK NEGATIVE A FURTHER W/U WOULD BE NECESSARY  WILL FORWARD FOR ANY ADDITIONAL ORDERS  Shabnam Brown - 14 Aug 2017 1:08 PM     TASK REPLIED TO: Previously Assigned To Citlaly Hansen           Is she symptomtatic? If yes, should have a cystoscopy  Janice Florence - 14 Aug 2017 1:19 PM     TASK REPLIED TO: Previously Assigned To [facility] [address],TEAM  LMOM EXPLAINING TO PT IF STILL SYMPTOMATIC NEEDS APPT WITH Janice Hawkins - 14 Aug 2017 1:22 PM     TASK REASSIGNED: Previously Assigned To Jj Barrow - 14 Aug 2017 1:23 PM     TASK REASSIGNED: Previously Assigned To [facility] Areatha Balint - 14 Aug 2017 1:23 PM     TASK IN PROGRESS        Active Problems    1  Arthritis (716 90) (M19 90)   2  Asthma (493 90) (J45 909)   3  Dyslipidemia (272 4) (E78 5)   4  Hypothyroidism (244 9) (E03 9)   5  Palpitations (785 1) (R00 2)   6  Premature ventricular contractions (427 69) (I49 3)   7  Type 1 diabetes mellitus without complication (869 00) (I42 1)   8  UTI (urinary tract infection) (599 0) (N39 0)    Current Meds   1  Apidra SoloStar 100 UNIT/ML SOLN;   Therapy: (Recorded:10Oct2016) to Recorded   2  Atorvastatin Calcium 20 MG Oral Tablet; TAKE 1 TABLET DAILY  Requested for:   74MHA4769; Last Rx:06Mar2017 Ordered   3  Ciprofloxacin HCl - 500 MG Oral Tablet; Take 1 tablet twice daily;    Therapy: 75ZUP5953 to (Evaluate:11Aug2017)  Requested for: 22SUL6421; Last   Rx:43Sgb9651 Ordered   4  Metoprolol Succinate ER 25 MG Oral Tablet Extended Release 24 Hour; TAKE  1/2   TABLET BY MOUTH EVERY DAY; Therapy: 35GUU5603 to (Evaluate:57Zzs7388)  Requested for: 61BAD0538; Last   Rx:[id number] Ordered   5  Singulair 10 MG Oral Tablet (Montelukast Sodium); Therapy: (Recorded:10Oct2016) to Recorded   6  Synthroid 88 MCG Oral Tablet (Levothyroxine Sodium); TAKE 1 TABLET DAILY AS   DIRECTED; Therapy: (Recorded:10Oct2016) to Recorded   7  Zoloft 100 MG Oral Tablet (Sertraline HCl); TAKE TWO TABLETS BY MOUTH DAILY; Therapy: (Recorded:10Oct2016) to Recorded    Allergies    1  Iodinated Contrast Media   2   Morphine Derivatives    Signatures   Electronically signed by : Kelsie Velasco RN; Aug 15 2017  9:29AM EST                       (Author)

## 2018-01-15 VITALS
BODY MASS INDEX: 30.12 KG/M2 | DIASTOLIC BLOOD PRESSURE: 58 MMHG | SYSTOLIC BLOOD PRESSURE: 100 MMHG | WEIGHT: 170 LBS | HEIGHT: 63 IN

## 2018-01-17 NOTE — MISCELLANEOUS
Provider Comments  Provider Comments:   Pt was a no-show for today's 10am apt   L/M to call us and r/s      Signatures   Electronically signed by : Felicia Wharton, ; Sep  8 2017  2:56PM EST                       (Administrative)

## 2018-01-22 VITALS
WEIGHT: 174 LBS | BODY MASS INDEX: 30.83 KG/M2 | DIASTOLIC BLOOD PRESSURE: 68 MMHG | SYSTOLIC BLOOD PRESSURE: 120 MMHG | HEIGHT: 63 IN

## 2018-01-22 VITALS
BODY MASS INDEX: 29.95 KG/M2 | DIASTOLIC BLOOD PRESSURE: 74 MMHG | WEIGHT: 169 LBS | HEIGHT: 63 IN | SYSTOLIC BLOOD PRESSURE: 116 MMHG

## 2018-01-23 NOTE — MISCELLANEOUS
Message   Recorded as Task   Date: 01/04/2018 03:35 PM, Created By: Tad Guzman   Task Name: Care Coordination   Assigned To: Marciano REYEZ,TEAM   Regarding Patient: Aravind Abrams, Status: In Progress   Comment:    HattiejoshTony batistan - 04 Jan 2018 3:35 PM     TASK CREATED  Patient is in the hospital and would like a call back from the nurses regarding her stent removal  the cell phone is the best number [phone]   [facility] - 04 Jan 2018 3:48 PM     TASK IN PROGRESS   González Gaffneynille - 04 Jan 2018 4:02 PM     TASK EDITED  Pt states that she went to hospitals, per pt all they did was send her home with antibiotics  Pt then went to [facility] were she currently is and they are running all sorts of tests  Pt will call and schedule stent removal when she is discharged  Active Problems    1  Arthritis (716 90) (M19 90)   2  Asthma (493 90) (J45 909)   3  Dyslipidemia (272 4) (E78 5)   4  Hypothyroidism (244 9) (E03 9)   5  Palpitations (785 1) (R00 2)   6  Premature ventricular contractions (427 69) (I49 3)   7  Recurrent kidney stones (592 0) (N20 0)   8  Type 1 diabetes mellitus without complication (209 78) (S09 2)   9  UTI (urinary tract infection) (599 0) (N39 0)    Current Meds   1  Apidra SoloStar 100 UNIT/ML SOLN;   Therapy: (Recorded:10Oct2016) to Recorded   2  Atorvastatin Calcium 20 MG Oral Tablet; TAKE 1 TABLET DAILY  Requested for:   28FDJ8231; Last Rx:06Mar2017 Ordered   3  Hydrocodone-Acetaminophen  MG Oral Tablet; TAKE 1 TABLET EVERY 4 TO 6   HOURS AS NEEDED FOR PAIN;   Therapy: 16WGA5524 to (Evaluate:77Ogm4248); Last Rx:19Gkc1810 Ordered   4  Metoprolol Succinate ER 25 MG Oral Tablet Extended Release 24 Hour; TAKE 1/2   TABLET BY MOUTH EVERY DAY; Therapy: 76YJE7795 to (Vásquez Sas)  Requested for: 10Wsz1576; Last   Rx:89Fpm9725 Ordered   5  Singulair 10 MG Oral Tablet (Montelukast Sodium); Therapy: (Recorded:10Oct2016) to Recorded   6  Synthroid 88 MCG Oral Tablet (Levothyroxine Sodium); TAKE 1 TABLET DAILY AS   DIRECTED; Therapy: (Recorded:10Oct2016) to Recorded   7  Vicodin 5-300 MG Oral Tablet; TAKE 1 TABLET EVERY 4 TO 6 HOURS AS NEEDED   FOR PAIN;   Therapy: 64HRW5532 to (Evaluate:83Hmz3009); Last Rx:23Fbd4161 Ordered   8  Zoloft 100 MG Oral Tablet (Sertraline HCl); TAKE TWO TABLETS BY MOUTH DAILY; Therapy: (Recorded:10Oct2016) to Recorded    Allergies    1  Iodinated Contrast Media   2   Morphine Derivatives    Signatures   Electronically signed by : Dionisio Godinez, ; Jan 4 2018  4:02PM EST                       (Author)

## 2018-01-23 NOTE — MISCELLANEOUS
Message   Recorded as Task   Date: 12/11/2017 09:27 AM, Created By: Mery Sousa   Task Name: Call Back   Assigned To: Marciano REYEZ,TEAM   Regarding Patient: Alicia Wolf, Status: Active   Comment:    Mery Tongis - 11 Dec 2017 9:27 AM     TASK CREATED  Caller: Self; [phone] (Home)  Pt was seen at [facility] 12/10/17 for stones and calling to schedule fup appt  [address] [place] [address] Dec 2017 9:44 AM     TASK EDITED  PT STATES SHE HAS 5 MM LEFT URETERAL STONE  APPT TODAY AT 1 PM WITH DR Joshua Duvall  Active Problems    1  Arthritis (716 90) (M19 90)   2  Asthma (493 90) (J45 909)   3  Dyslipidemia (272 4) (E78 5)   4  Hypothyroidism (244 9) (E03 9)   5  Palpitations (785 1) (R00 2)   6  Premature ventricular contractions (427 69) (I49 3)   7  Type 1 diabetes mellitus without complication (504 04) (V42 2)   8  UTI (urinary tract infection) (599 0) (N39 0)    Current Meds   1  Apidra SoloStar 100 UNIT/ML SOLN;   Therapy: (Recorded:10Oct2016) to Recorded   2  Atorvastatin Calcium 20 MG Oral Tablet; TAKE 1 TABLET DAILY  Requested for:   18KNI4198; Last Rx:06Mar2017 Ordered   3  Ciprofloxacin HCl - 500 MG Oral Tablet; Take 1 tablet twice daily; Therapy: 94GJA3187 to (Evaluate:11Aug2017)  Requested for: 20Zgy4864; Last   Rx:[id number] Ordered   4  Metoprolol Succinate ER 25 MG Oral Tablet Extended Release 24 Hour; TAKE  1/2   TABLET BY MOUTH EVERY DAY; Therapy: 32PUQ3554 to (Evaluate:17Ezj7772)  Requested for: 04NJY9964; Last   Rx:[id number] Ordered   5  Singulair 10 MG Oral Tablet (Montelukast Sodium); Therapy: (Recorded:10Oct2016) to Recorded   6  Synthroid 88 MCG Oral Tablet (Levothyroxine Sodium); TAKE 1 TABLET DAILY AS   DIRECTED; Therapy: (Recorded:10Oct2016) to Recorded   7  Zoloft 100 MG Oral Tablet (Sertraline HCl); TAKE TWO TABLETS BY MOUTH DAILY; Therapy: (Recorded:10Oct2016) to Recorded    Allergies    1  Iodinated Contrast Media   2   Morphine Derivatives    Signatures   Electronically signed by : Bertram Fitzgerald RN; Dec 11 2017  9:45AM EST                       (Author)

## 2018-01-23 NOTE — MISCELLANEOUS
Message   Recorded as Task   Date: 01/05/2018 12:31 PM, Created By: Areli Joseph   Task Name: Call Back   Assigned To: Marciano REYEZ,TEAM   Regarding Patient: Shereen Perry, Status: Active   Comment:    Areli Joseph - 05 Jan 2018 12:31 PM     TASK CREATED  Patients  called in asking to speak with the nurse in regards to her stent can he receive a call back  please and thank  Oseas Crouch - 05 Jan 2018 12:51 PM     TASK EDITED  Schedule for cysto stent removal 01/8/2018 with Dr Rik Vásquez    1  Arthritis (716 90) (M19 90)   2  Asthma (493 90) (J45 909)   3  Dyslipidemia (272 4) (E78 5)   4  Hypothyroidism (244 9) (E03 9)   5  Palpitations (785 1) (R00 2)   6  Premature ventricular contractions (427 69) (I49 3)   7  Recurrent kidney stones (592 0) (N20 0)   8  Type 1 diabetes mellitus without complication (260 34) (L98 6)   9  UTI (urinary tract infection) (599 0) (N39 0)    Current Meds   1  Apidra SoloStar 100 UNIT/ML SOLN;   Therapy: (Recorded:10Oct2016) to Recorded   2  Atorvastatin Calcium 20 MG Oral Tablet; TAKE 1 TABLET DAILY  Requested for:   28WFQ8346; Last Rx:06Mar2017 Ordered   3  Hydrocodone-Acetaminophen  MG Oral Tablet; TAKE 1 TABLET EVERY 4 TO 6   HOURS AS NEEDED FOR PAIN;   Therapy: 87RGK6488 to (Evaluate:07Zig6565); Last Rx:59Nvm9945 Ordered   4  Metoprolol Succinate ER 25 MG Oral Tablet Extended Release 24 Hour; TAKE 1/2   TABLET BY MOUTH EVERY DAY; Therapy: 50OCH6934 to (Threasa Garre)  Requested for: 27Lve3331; Last   Rx:42Evn8639 Ordered   5  Singulair 10 MG Oral Tablet (Montelukast Sodium); Therapy: (Recorded:10Oct2016) to Recorded   6  Synthroid 88 MCG Oral Tablet (Levothyroxine Sodium); TAKE 1 TABLET DAILY AS   DIRECTED; Therapy: (Recorded:10Oct2016) to Recorded   7  Vicodin 5-300 MG Oral Tablet; TAKE 1 TABLET EVERY 4 TO 6 HOURS AS NEEDED   FOR PAIN;   Therapy: 26AGK6268 to (Evaluate:47Djr2272);  Last Rx:25Bon0025 Ordered   8  Zoloft 100 MG Oral Tablet (Sertraline HCl); TAKE TWO TABLETS BY MOUTH DAILY; Therapy: (Recorded:10Oct2016) to Recorded    Allergies    1  Iodinated Contrast Media   2   Morphine Derivatives    Signatures   Electronically signed by : Chepe Naylor, ; Jan 5 2018 12:52PM EST                       (Author)

## 2018-01-23 NOTE — MISCELLANEOUS
Message   Recorded as Task   Date: 12/15/2017 11:02 AM, Created By: Lindsay Abreu   Task Name: Call Back   Assigned To: Marciano REYEZ,TEAM   Regarding Patient: Luh Coy, Status: In Progress   Comment:    Martha Steele - 15 Dec 2017 11:02 AM     TASK CREATED  Caller: Self; [phone] (Home)  Pt was discharged from [facility] 12/14/17 was given percoset and doesn't agree with patients stomach she then was given Vicodine 10mg and won't have enough  Her  would stop by to  prescription  Mili's also requesting urocit-k  Please advise [phone]   Ebony Kaur - 15 Dec 2017 11:13 AM     TASK EDITED  PT HAD LEFT URETEROSCOPY, RETROGRADE AND STENT PLACED 12/14  WILL DIRECT TO DR BRICE FOR VICODIN ORDER  Ebony Kaur - 15 Dec 2017 11:13 AM     TASK IN PROGRESS   Ebony Kaur - 15 Dec 2017 1:29 PM     TASK EDITED  PT NOTIFIED VICODIN ORDER READY TO BE PICKED UP AT FRONT OFFICE  APPT 12/19 WITH DR Tyson Barreto TO DISCUSS SECOND PROCEDURE 1        1 Amended By: Nena Powers; Dec 15 2017 4:40 PM EST    Active Problems   1  Arthritis (716 90) (M19 90)  2  Asthma (493 90) (J45 909)  3  Dyslipidemia (272 4) (E78 5)  4  Hypothyroidism (244 9) (E03 9)  5  Palpitations (785 1) (R00 2)  6  Premature ventricular contractions (427 69) (I49 3)  7  Recurrent kidney stones (592 0) (N20 0)  8  Type 1 diabetes mellitus without complication (846 27) (I84 4)  9  UTI (urinary tract infection) (599 0) (N39 0)    Current Meds  1  Apidra SoloStar 100 UNIT/ML SOLN;   Therapy: (Recorded:10Oct2016) to Recorded  2  Atorvastatin Calcium 20 MG Oral Tablet; TAKE 1 TABLET DAILY  Requested for:   67FRK0599; Last Rx:06Mar2017 Ordered  3  Hydrocodone-Acetaminophen  MG Oral Tablet; TAKE 1 TABLET EVERY 4 TO 6   HOURS AS NEEDED FOR PAIN;   Therapy: 89BIE0168 to (Evaluate:79Naq8761); Last Rx:47Xdh3822 Ordered  4   Metoprolol Succinate ER 25 MG Oral Tablet Extended Release 24 Hour; TAKE 1/2   TABLET BY MOUTH EVERY DAY; Therapy: 41CGF1213 to (Evaluate:23Hst8639)  Requested for: 99RSA4384; Last   Rx:19Dec2016 Ordered  5  Singulair 10 MG Oral Tablet (Montelukast Sodium); Therapy: (Recorded:10Oct2016) to Recorded  6  Synthroid 88 MCG Oral Tablet (Levothyroxine Sodium); TAKE 1 TABLET DAILY AS   DIRECTED; Therapy: (Recorded:10Oct2016) to Recorded  7  Vicodin 5-300 MG Oral Tablet; TAKE 1 TABLET EVERY 4 TO 6 HOURS AS NEEDED   FOR PAIN;   Therapy: 23WSF3909 to (Evaluate:19Dec2017); Last Rx:15Dec2017 Ordered  8  Zoloft 100 MG Oral Tablet (Sertraline HCl); TAKE TWO TABLETS BY MOUTH DAILY; Therapy: (Recorded:10Oct2016) to Recorded    Allergies   1  Iodinated Contrast Media  2   Morphine Derivatives    Signatures   Electronically signed by : Marizol Morfin RN; Dec 15 2017  4:40PM EST                       (Author)

## 2018-02-08 DIAGNOSIS — N20.0 CALCULUS OF KIDNEY: ICD-10-CM

## 2018-04-20 DIAGNOSIS — E78.5 DYSLIPIDEMIA: Primary | ICD-10-CM

## 2018-04-20 RX ORDER — ATORVASTATIN CALCIUM 20 MG/1
TABLET, FILM COATED ORAL
Qty: 90 TABLET | Refills: 2 | Status: SHIPPED | OUTPATIENT
Start: 2018-04-20 | End: 2021-10-07 | Stop reason: ALTCHOICE

## 2018-05-29 ENCOUNTER — TELEPHONE (OUTPATIENT)
Dept: UROLOGY | Facility: MEDICAL CENTER | Age: 43
End: 2018-05-29

## 2018-05-29 ENCOUNTER — APPOINTMENT (OUTPATIENT)
Dept: LAB | Facility: HOSPITAL | Age: 43
End: 2018-05-29
Attending: UROLOGY
Payer: COMMERCIAL

## 2018-05-29 DIAGNOSIS — R30.0 DYSURIA: Primary | ICD-10-CM

## 2018-05-29 DIAGNOSIS — R30.0 DYSURIA: ICD-10-CM

## 2018-05-29 PROCEDURE — 87086 URINE CULTURE/COLONY COUNT: CPT

## 2018-05-29 NOTE — TELEPHONE ENCOUNTER
Pt has had symptoms on and off since Tues  Becoming progressively worse  Frequency, dysuria, pain left groin which radiates to back  Feels nauseous  Denies fever , hematuria or cloudy urine  Will go for culture at   [facility]  Will forward to Dr Traci Quiles

## 2018-05-30 LAB — BACTERIA UR CULT: NORMAL

## 2018-06-05 ENCOUNTER — TELEPHONE (OUTPATIENT)
Dept: UROLOGY | Facility: MEDICAL CENTER | Age: 43
End: 2018-06-05

## 2018-06-05 DIAGNOSIS — N20.1 LEFT URETERAL STONE: Primary | ICD-10-CM

## 2018-07-10 ENCOUNTER — TELEPHONE (OUTPATIENT)
Dept: UROLOGY | Facility: MEDICAL CENTER | Age: 43
End: 2018-07-10

## 2018-07-10 ENCOUNTER — OFFICE VISIT (OUTPATIENT)
Dept: UROLOGY | Facility: MEDICAL CENTER | Age: 43
End: 2018-07-10
Payer: COMMERCIAL

## 2018-07-10 VITALS
HEIGHT: 63 IN | WEIGHT: 172 LBS | SYSTOLIC BLOOD PRESSURE: 124 MMHG | DIASTOLIC BLOOD PRESSURE: 64 MMHG | BODY MASS INDEX: 30.48 KG/M2

## 2018-07-10 DIAGNOSIS — R10.9 LEFT FLANK PAIN: Primary | ICD-10-CM

## 2018-07-10 LAB
SL AMB  POCT GLUCOSE, UA: NEGATIVE
SL AMB LEUKOCYTE ESTERASE,UA: NORMAL
SL AMB POCT BILIRUBIN,UA: NEGATIVE
SL AMB POCT BLOOD,UA: NEGATIVE
SL AMB POCT CLARITY,UA: CLEAR
SL AMB POCT COLOR,UA: YELLOW
SL AMB POCT KETONES,UA: NEGATIVE
SL AMB POCT NITRITE,UA: NEGATIVE
SL AMB POCT PH,UA: 6.5
SL AMB POCT SPECIFIC GRAVITY,UA: 1.01
SL AMB POCT URINE PROTEIN: NEGATIVE
SL AMB POCT UROBILINOGEN: 0.2

## 2018-07-10 PROCEDURE — 81003 URINALYSIS AUTO W/O SCOPE: CPT | Performed by: UROLOGY

## 2018-07-10 PROCEDURE — 99214 OFFICE O/P EST MOD 30 MIN: CPT | Performed by: UROLOGY

## 2018-07-10 RX ORDER — TRAMADOL HYDROCHLORIDE 50 MG/1
50 TABLET ORAL EVERY 6 HOURS PRN
Qty: 24 TABLET | Refills: 0 | Status: SHIPPED | OUTPATIENT
Start: 2018-07-10 | End: 2018-10-03

## 2018-07-10 NOTE — LETTER
August 28, 2018     Koki Avinash, [address],  [address] [address]    Patient: Phi Ochoa   YOB: 1975   Date of Visit: 7/10/2018       Dear Dr Arreola Police: Thank you for referring Phi Ochoa to me for evaluation  Below are my notes for this consultation  If you have questions, please do not hesitate to call me  I look forward to following your patient along with you  Sincerely,        Dorma Epley, MD        CC: No Recipients  Dorma Epley, MD  8/28/2018  2:03 PM  Sign at close encounter  Assessment/Plan:    Left flank pain    We will work the patient up for renal stones  Diagnoses and all orders for this visit:    Left flank pain  -     POCT urine dip auto non-scope  -     CT renal stone study abdomen pelvis wo contrast; Future  -     traMADol (ULTRAM) 50 mg tablet; Take 1 tablet (50 mg total) by mouth every 6 (six) hours as needed for moderate pain  -     Cancel: CBC; Future          Subjective:      Patient ID: Phi Ochoa is a 44 y o  female  HPI  Renal calculi:   In December 2017, the patient had left ureteroscopy for ureteral stone  No stone material was retrieved for analysis  A CT scan obtained in May 2017 showed multiple punctate stones in the left kidney  None described on the right  Reminded to do 24 hour urine when she is well  Flank pain:  Pt had several days of UTI sx with a negative culture at he end of May 2018  Pain located in left flank and is worse after voiding  This feels like the stent is still in or there simin a stone  Motrin and Tylenol do not work  Has not taken any Rx  Pain interferes with ADL's  Pain worst with sitting but pt is able to walk and life without much discomfort  Pain may or may not coincide with sex  Pain triggers nausea  Known gastroparesis  No change in appetite or bowel habits  Has had hysterectomy and 1 ovary removed          The following portions of the patient's history were reviewed and updated as appropriate: allergies, current medications, past family history, past medical history, past social history, past surgical history and problem list     Review of Systems   Constitutional: Negative for activity change and fatigue  Respiratory: Negative for shortness of breath and wheezing  Cardiovascular: Negative for chest pain  Gastrointestinal: Negative for abdominal pain  Endocrine:          Hypothyroidism on treatment  Insulin-dependent diabetes mellitus  Genitourinary: Negative for difficulty urinating, dysuria, frequency, hematuria and urgency  Musculoskeletal: Negative for back pain and gait problem  Skin: Negative  Allergic/Immunologic: Negative  Neurological: Negative  Psychiatric/Behavioral: Negative  Objective:      /64 (BP Location: Left arm, Patient Position: Sitting, Cuff Size: Standard)   Ht 5' 3" (1 6 m)   Wt 78 kg (172 lb)   BMI 30 47 kg/m²           Physical Exam   Constitutional: She is oriented to person, place, and time  She appears well-developed and well-nourished  Neck: Normal range of motion  Neck supple  Pulmonary/Chest: Effort normal    Neurological: She is alert and oriented to person, place, and time  She has normal reflexes  Psychiatric: She has a normal mood and affect   Her behavior is normal  Judgment and thought content normal

## 2018-07-10 NOTE — TELEPHONE ENCOUNTER
CT was scheduled at [address] [place] [address] but was cancelled  The [facility] [place] [address]  The authorization number is [id number]

## 2018-07-10 NOTE — PROGRESS NOTES
Assessment/Plan:    Left flank pain    We will work the patient up for renal stones  Diagnoses and all orders for this visit:    Left flank pain  -     POCT urine dip auto non-scope  -     CT renal stone study abdomen pelvis wo contrast; Future  -     traMADol (ULTRAM) 50 mg tablet; Take 1 tablet (50 mg total) by mouth every 6 (six) hours as needed for moderate pain  -     Cancel: CBC; Future          Subjective:      Patient ID: Valentina Babinski is a 44 y o  female  HPI  Renal calculi:   In December 2017, the patient had left ureteroscopy for ureteral stone  No stone material was retrieved for analysis  A CT scan obtained in May 2017 showed multiple punctate stones in the left kidney  None described on the right  Reminded to do 24 hour urine when she is well  Flank pain:  Pt had several days of UTI sx with a negative culture at he end of May 2018  Pain located in left flank and is worse after voiding  This feels like the stent is still in or there simin a stone  Motrin and Tylenol do not work  Has not taken any Rx  Pain interferes with ADL's  Pain worst with sitting but pt is able to walk and life without much discomfort  Pain may or may not coincide with sex  Pain triggers nausea  Known gastroparesis  No change in appetite or bowel habits  Has had hysterectomy and 1 ovary removed  The following portions of the patient's history were reviewed and updated as appropriate: allergies, current medications, past family history, past medical history, past social history, past surgical history and problem list     Review of Systems   Constitutional: Negative for activity change and fatigue  Respiratory: Negative for shortness of breath and wheezing  Cardiovascular: Negative for chest pain  Gastrointestinal: Negative for abdominal pain  Endocrine:          Hypothyroidism on treatment  Insulin-dependent diabetes mellitus     Genitourinary: Negative for difficulty urinating, dysuria, frequency, hematuria and urgency  Musculoskeletal: Negative for back pain and gait problem  Skin: Negative  Allergic/Immunologic: Negative  Neurological: Negative  Psychiatric/Behavioral: Negative  Objective:      /64 (BP Location: Left arm, Patient Position: Sitting, Cuff Size: Standard)   Ht 5' 3" (1 6 m)   Wt 78 kg (172 lb)   BMI 30 47 kg/m²          Physical Exam   Constitutional: She is oriented to person, place, and time  She appears well-developed and well-nourished  Neck: Normal range of motion  Neck supple  Pulmonary/Chest: Effort normal    Neurological: She is alert and oriented to person, place, and time  She has normal reflexes  Psychiatric: She has a normal mood and affect   Her behavior is normal  Judgment and thought content normal

## 2018-07-16 ENCOUNTER — TELEPHONE (OUTPATIENT)
Dept: UROLOGY | Facility: MEDICAL CENTER | Age: 43
End: 2018-07-16

## 2018-07-16 NOTE — TELEPHONE ENCOUNTER
Pt had done at [place] [address]  Requested to have faxed to office  Will check for results and forward to Dr Sammi Salinas

## 2018-07-16 NOTE — TELEPHONE ENCOUNTER
Pt notified Dr Sylvain Hester reviewed CT Scan  Two small left ureteral stones  Pt has option to pass on own or sched u-scope with provider still does surgery  Recommend Dr Tosha Huffman if wants surgical intervention

## 2018-08-28 PROBLEM — R10.9 LEFT FLANK PAIN: Status: ACTIVE | Noted: 2018-08-28

## 2018-10-02 ENCOUNTER — TRANSCRIBE ORDERS (OUTPATIENT)
Dept: ADMINISTRATIVE | Facility: HOSPITAL | Age: 43
End: 2018-10-02

## 2018-10-02 ENCOUNTER — APPOINTMENT (OUTPATIENT)
Dept: LAB | Facility: HOSPITAL | Age: 43
End: 2018-10-02
Payer: COMMERCIAL

## 2018-10-02 DIAGNOSIS — M65.4 RADIAL STYLOID TENOSYNOVITIS: ICD-10-CM

## 2018-10-02 DIAGNOSIS — Z01.818 PREOP EXAMINATION: ICD-10-CM

## 2018-10-02 DIAGNOSIS — Z01.818 PREOP EXAMINATION: Primary | ICD-10-CM

## 2018-10-02 LAB
APTT PPP: 32 SECONDS (ref 24–36)
BASOPHILS # BLD AUTO: 0.13 THOUSANDS/ΜL (ref 0–0.1)
BASOPHILS NFR BLD AUTO: 1 % (ref 0–1)
EOSINOPHIL # BLD AUTO: 0.62 THOUSAND/ΜL (ref 0–0.61)
EOSINOPHIL NFR BLD AUTO: 5 % (ref 0–6)
ERYTHROCYTE [DISTWIDTH] IN BLOOD BY AUTOMATED COUNT: 12.6 % (ref 11.6–15.1)
HCT VFR BLD AUTO: 43.9 % (ref 34.8–46.1)
HGB BLD-MCNC: 14.6 G/DL (ref 11.5–15.4)
IMM GRANULOCYTES # BLD AUTO: 0.05 THOUSAND/UL (ref 0–0.2)
IMM GRANULOCYTES NFR BLD AUTO: 0 % (ref 0–2)
INR PPP: 0.93 (ref 0.86–1.17)
LYMPHOCYTES # BLD AUTO: 2.97 THOUSANDS/ΜL (ref 0.6–4.47)
LYMPHOCYTES NFR BLD AUTO: 24 % (ref 14–44)
MCH RBC QN AUTO: 31.1 PG (ref 26.8–34.3)
MCHC RBC AUTO-ENTMCNC: 33.3 G/DL (ref 31.4–37.4)
MCV RBC AUTO: 93 FL (ref 82–98)
MONOCYTES # BLD AUTO: 0.63 THOUSAND/ΜL (ref 0.17–1.22)
MONOCYTES NFR BLD AUTO: 5 % (ref 4–12)
NEUTROPHILS # BLD AUTO: 7.89 THOUSANDS/ΜL (ref 1.85–7.62)
NEUTS SEG NFR BLD AUTO: 65 % (ref 43–75)
NRBC BLD AUTO-RTO: 0 /100 WBCS
PLATELET # BLD AUTO: 322 THOUSANDS/UL (ref 149–390)
PMV BLD AUTO: 11.7 FL (ref 8.9–12.7)
PROTHROMBIN TIME: 12.6 SECONDS (ref 11.8–14.2)
RBC # BLD AUTO: 4.7 MILLION/UL (ref 3.81–5.12)
WBC # BLD AUTO: 12.29 THOUSAND/UL (ref 4.31–10.16)

## 2018-10-02 PROCEDURE — 36415 COLL VENOUS BLD VENIPUNCTURE: CPT

## 2018-10-02 PROCEDURE — 85730 THROMBOPLASTIN TIME PARTIAL: CPT

## 2018-10-02 PROCEDURE — 85025 COMPLETE CBC W/AUTO DIFF WBC: CPT

## 2018-10-02 PROCEDURE — 85610 PROTHROMBIN TIME: CPT

## 2019-09-20 ENCOUNTER — TELEPHONE (OUTPATIENT)
Dept: UROLOGY | Facility: AMBULATORY SURGERY CENTER | Age: 44
End: 2019-09-20

## 2019-09-20 NOTE — TELEPHONE ENCOUNTER
Faxed all records to [facility] (fax# [phone]) for patient's appointment there on 9-26-19

## 2020-08-12 ENCOUNTER — TRANSCRIBE ORDERS (OUTPATIENT)
Dept: ADMINISTRATIVE | Facility: HOSPITAL | Age: 45
End: 2020-08-12

## 2020-08-12 DIAGNOSIS — S92.425B: Primary | ICD-10-CM

## 2020-09-09 ENCOUNTER — HOSPITAL ENCOUNTER (OUTPATIENT)
Dept: MRI IMAGING | Facility: HOSPITAL | Age: 45
Discharge: HOME | End: 2020-09-09
Attending: PODIATRIST
Payer: COMMERCIAL

## 2020-09-09 DIAGNOSIS — S92.425B: ICD-10-CM

## 2020-09-09 PROCEDURE — 73718 MRI LOWER EXTREMITY W/O DYE: CPT

## 2020-09-09 PROCEDURE — G1004 CDSM NDSC: HCPCS

## 2020-11-11 ENCOUNTER — OFFICE VISIT (OUTPATIENT)
Dept: ENDOCRINOLOGY | Facility: CLINIC | Age: 45
End: 2020-11-11
Payer: COMMERCIAL

## 2020-11-11 VITALS
HEIGHT: 63 IN | SYSTOLIC BLOOD PRESSURE: 110 MMHG | BODY MASS INDEX: 30.65 KG/M2 | DIASTOLIC BLOOD PRESSURE: 60 MMHG | WEIGHT: 173 LBS | HEART RATE: 60 BPM | TEMPERATURE: 99.7 F

## 2020-11-11 DIAGNOSIS — E11.43 DIABETES MELLITUS WITH GASTROPARESIS (HCC): ICD-10-CM

## 2020-11-11 DIAGNOSIS — E03.9 ACQUIRED HYPOTHYROIDISM: Primary | ICD-10-CM

## 2020-11-11 DIAGNOSIS — E10.42 DIABETIC POLYNEUROPATHY ASSOCIATED WITH TYPE 1 DIABETES MELLITUS (HCC): ICD-10-CM

## 2020-11-11 DIAGNOSIS — E10.69 TYPE 1 DIABETES MELLITUS WITH OTHER SPECIFIED COMPLICATION (HCC): ICD-10-CM

## 2020-11-11 DIAGNOSIS — Z96.41 INSULIN PUMP STATUS: ICD-10-CM

## 2020-11-11 PROBLEM — E10.3599 TYPE 1 DIABETES MELLITUS WITH PROLIFERATIVE RETINOPATHY WITHOUT MACULAR EDEMA (HCC): Status: ACTIVE | Noted: 2019-12-06

## 2020-11-11 PROBLEM — M65.312 TRIGGER FINGER OF LEFT THUMB: Status: ACTIVE | Noted: 2018-11-07

## 2020-11-11 PROBLEM — R91.8 PULMONARY NODULES: Status: ACTIVE | Noted: 2019-06-19

## 2020-11-11 PROBLEM — Z79.899 MEDICAL MARIJUANA USE: Status: ACTIVE | Noted: 2019-11-06

## 2020-11-11 PROBLEM — Z87.442 PERSONAL HISTORY OF KIDNEY STONES: Status: ACTIVE | Noted: 2019-09-04

## 2020-11-11 PROCEDURE — 95251 CONT GLUC MNTR ANALYSIS I&R: CPT | Performed by: INTERNAL MEDICINE

## 2020-11-11 PROCEDURE — 99204 OFFICE O/P NEW MOD 45 MIN: CPT | Performed by: INTERNAL MEDICINE

## 2020-11-11 RX ORDER — ROSUVASTATIN CALCIUM 20 MG/1
TABLET, COATED ORAL
COMMUNITY
Start: 2020-09-17

## 2020-11-11 RX ORDER — CETIRIZINE HYDROCHLORIDE 10 MG/1
10 TABLET ORAL DAILY
COMMUNITY
Start: 2020-09-01 | End: 2021-09-25

## 2020-11-11 RX ORDER — BUDESONIDE AND FORMOTEROL FUMARATE DIHYDRATE 160; 4.5 UG/1; UG/1
2 AEROSOL RESPIRATORY (INHALATION) DAILY
COMMUNITY
Start: 2019-09-01

## 2020-11-11 RX ORDER — LEVOTHYROXINE SODIUM 88 UG/1
88 TABLET ORAL DAILY
Qty: 30 TABLET | Refills: 5 | Status: SHIPPED | OUTPATIENT
Start: 2020-11-11 | End: 2021-05-17

## 2021-04-01 LAB
CREAT ?TM UR-SCNC: 142 UMOL/L
HBA1C MFR BLD HPLC: 8 %
MICROALBUMIN/CREAT UR: NORMAL MG/G{CREAT}

## 2021-04-08 ENCOUNTER — TELEMEDICINE (OUTPATIENT)
Dept: ENDOCRINOLOGY | Facility: CLINIC | Age: 46
End: 2021-04-08
Payer: COMMERCIAL

## 2021-04-08 DIAGNOSIS — Z96.41 INSULIN PUMP STATUS: ICD-10-CM

## 2021-04-08 DIAGNOSIS — E03.9 ACQUIRED HYPOTHYROIDISM: ICD-10-CM

## 2021-04-08 DIAGNOSIS — E78.5 DYSLIPIDEMIA: ICD-10-CM

## 2021-04-08 DIAGNOSIS — E10.3599 TYPE 1 DIABETES MELLITUS WITH PROLIFERATIVE RETINOPATHY WITHOUT MACULAR EDEMA, UNSPECIFIED LATERALITY (HCC): Primary | ICD-10-CM

## 2021-04-08 PROCEDURE — 99215 OFFICE O/P EST HI 40 MIN: CPT | Performed by: NURSE PRACTITIONER

## 2021-04-08 PROCEDURE — 95251 CONT GLUC MNTR ANALYSIS I&R: CPT | Performed by: NURSE PRACTITIONER

## 2021-04-08 NOTE — PROGRESS NOTES
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 (HCC) - Primary     Review of pump/sensor shows highly variable BG with hypo ans hyperglycemia due to dietary inconsistencies, issues with meal boluses given her gastroparesis, and issues with insulin absorptions  Patient requires diabetes education to help improve her boluses and MNT for diabetes and gastroparesis  For now will change her carb ratios to 7 5 to help provide more consistency  Change correction factor at 12 am to 40, 3 am to 30, and 12 pm to 40  She will upload pump/sensor report in one week for review  She will require close monitoring of her BG as she will be started on steroids likely on 4/15 for upcoming sinus surgery 4/22         Relevant Orders    Ambulatory referral to Diabetic Education    T4, free    TSH, 3rd generation    Hemoglobin A1C    Comprehensive metabolic panel    Lipid Panel with Direct LDL reflex    Microalbumin / creatinine urine ratio       Other    Dyslipidemia     Continue current regimen          Relevant Orders    Lipid Panel with Direct LDL reflex    Insulin pump status               Reason for visit is   Chief Complaint   Patient presents with    Virtual Regular Visit        Encounter provider MUKUL Cordoba    Provider located at [address] [place] [address] [address] [address]      Recent Visits  Date Type Provider Dept   04/08/21 79 Krystyna Frederick, [address] [facility] 23   Showing recent visits within past 7 days and meeting all other requirements     Future Appointments  No visits were found meeting these conditions     Showing future appointments within next 150 days and meeting all other requirements        The patient was identified by name and date of birth  Candi Carreno was informed that this is a telemedicine visit and that the visit is being conducted through [address] [facility] and patient was informed that this is a secure, HIPAA-compliant platform  She agrees to proceed     My office door was closed  No one else was in the room  She acknowledged consent and understanding of privacy and security of the video platform  The patient has agreed to participate and understands they can discontinue the visit at any time  Patient is aware this is a billable service  Subjective  Candi Carreno is a 41 y o  female with a history of HLD, hypothyroidism, and type 1 diabetes with long term use of insulin since age 12  Reports complications of retinopathy, neuropathy, and gastroparesis  Last A1C 8 0  She is currently using medtronic 670G with dexcom G6 sensor  Review of pump/sensor report shows average BG of 195 with time in range 43%, time below 1%, time above 35%, very high 20%  BG variability is at 67%  She reports she is going for Functional Endoscopic Sinus Surgery at [facility] April 22  Plan is to start steroids for 7 days prior to surgery  She also reports insertion site/absorption issues due to scar tissue  She typically boluses after her meals due to her gastroparesis  Denies recent illness or hospitalizations  Denies recent severe hypoglycemic or severe hyperglycemic episodes  Denies any issues with his current regimen  Home glucose monitoring: are performed regularly    Patient is on a medtronic pump prescribed by endocrinologist  She has been on a pump for 2 years  She denies any malfunctioning of the pump       Current Insulin pump settings:  Basal rate: 12 am 0 75, 5 am 0 775, 9 am 0 800, 12 pm 0 775, 4 pm 0 775, and 9 pm 0 800  Insulin to carb ratio: 12 am 7 5, 7 am 8, 11 am 7 5, 4 pm 6, 10 pm 8  Insulin sensitivity factor: 12 am 45, 3 am 35, 12 pm 45  BG target:    Active Insulin time: 3 hrs    Type of insulin:  Apidra     Needs letter    Backup Plan: patient aware that in case of malfunctioning of the pump or unexplained hyperglycemia to use basal and bolus therapy as backup which is prescribed to the patient  Also notified patient to call clinic and/or pump company if any issues or go to emergency department if signs/symptoms of DKA  compliant all of the timedenies any side effects from current medications     Hypoglycemic episodes: Yes infrequent   H/o of hypoglycemia causing hospitalization or Intervention such as glucagon injection or ambulance call No   Hypoglycemia symptoms: dizziness, headache and sweating   Treatment of hypoglycemia: orange juice     Has hyperlipidemia: Taking Rosuvastatin  Has hypothyroidism: Taking Levothyroxine 88 mcg        Past Medical History:   Diagnosis Date    Allergic rhinitis     Anxiety     Arthritis     osteo    Asthma     borderline    Chronic pain disorder     right knee, lower back    Depression     Diabetes mellitus (HCC)     Type 1 x 36yrs,,"on insulin pump for approx 17 yrs or so"    Disease of thyroid gland     hypothyroid    Dry eyes, bilateral     Fatigue     GERD (gastroesophageal reflux disease)     gastroparesis    History of UTI     Kidney stone     several, hx of stent    Motion sickness     Nausea     Neuropathy     Neuropathy     PONV (postoperative nausea and vomiting)     "pt reports scopalamine patch does help"    Presence of surgical incision     "small biopsy site with steri and bandaid left outer thigh per pt report"    Pulmonary nodule     PVC (premature ventricular contraction)     Sarcoidosis     "is currently being tested to see if has it"    Status post right partial knee replacement     right    Tooth missing     Wears glasses        Past Surgical History:   Procedure Laterality Date    APPENDECTOMY      CARPAL TUNNEL RELEASE Bilateral      SECTION      CYSTOSCOPY W/ LASER LITHOTRIPSY Left 2017    Procedure: CYSTOSCOPY, LEFT RETROGRADE PYELOGRAM LEFT URETEROSCOPY,  LEFT STENT INSERTION;  Surgeon: Jacinto Rush MD;  Location: [facility];  Service: Urology    DILATION AND CURETTAGE OF UTERUS      FINGER SURGERY      HYSTERECTOMY      JOINT REPLACEMENT Right     partial knee    KNEE ARTHROSCOPY Right     x4    KNEE SURGERY      TN CYSTO/URETERO W/LITHOTRIPSY &INDWELL STENT INSRT Left 2017    Procedure: CYSTOSCOPY URETEROSCOPY,  RETROGRADE PYELOGRAM AND INSERTION STENT URETERAL;  Surgeon: Jacinto Rush MD;  Location: [facility];  Service: Urology    REMOVAL URETERAL STENT      SKIN BIOPSY Left     outer thigh    TRIGGER FINGER RELEASE      mul    URETERAL STENT PLACEMENT      VITRECTOMY Bilateral     right eye x2 and left x1    WISDOM TOOTH EXTRACTION         Current Outpatient Medications   Medication Sig Dispense Refill    ALPRAZolam (XANAX) 0 5 mg tablet Take 0 5 mg by mouth 2 (two) times a day as needed      ASPIRIN 81 PO aspirin 81 mg tablet,delayed release      atorvastatin (LIPITOR) 20 mg tablet TAKE 1 TABLET DAILY   90 tablet 2    budesonide-formoterol (Symbicort) 160-4 5 mcg/act inhaler       cetirizine (ZyrTEC Allergy) 10 mg tablet Take 10 mg by mouth daily      gabapentin (NEURONTIN) 100 mg capsule Take 200 mg by mouth daily at bedtime as needed      Glucagon 3 MG/DOSE POWD Use for severe hypoglycemia 1 each 2    insulin glulisine (APIDRA) 100 units/mL by Subcutaneous Insulin Pump route as needed        levothyroxine 88 mcg tablet Take 1 tablet (88 mcg total) by mouth daily 30 tablet 5    metoprolol succinate (TOPROL-XL) 25 mg 24 hr tablet Take 25 mg by mouth every evening      montelukast (SINGULAIR) 10 mg tablet Take 10 mg by mouth daily at bedtime      rosuvastatin (CRESTOR) 20 MG tablet TAKE 1 TABLET BY MOUTH EVERY DAY AT NIGHT      SERTRALINE HCL PO Take 200 mg by mouth daily       No current facility-administered medications for this visit  Allergies   Allergen Reactions    Insulin Aspart Abdominal Pain     Humulog and Novulog  Welts under skin and insulin resistance  Welts under skin and insulin resistance  Other reaction(s): Other  Welts under skin and insulin resistance    Morphine Other (See Comments)     Other reaction(s): Other  pain  Intensifies pain    Iodinated Diagnostic Agents Hives     Pt can tolerate shellfish, pt can tolerate betadine    Levofloxacin     Oxycodone GI Intolerance     Pt is able to tolerate hydrocodone       Review of Systems   Constitutional: Negative for chills and fever  HENT: Negative for sore throat and trouble swallowing  Eyes: Negative for visual disturbance  Respiratory: Negative for shortness of breath  Cardiovascular: Negative for chest pain and palpitations  Gastrointestinal: Negative for abdominal pain, constipation and diarrhea  Endocrine: Negative for cold intolerance, heat intolerance, polydipsia, polyphagia and polyuria  Genitourinary: Negative for frequency  Musculoskeletal: Negative for arthralgias and myalgias  Skin: Negative for rash  Neurological: Negative for dizziness and tremors  Hematological: Negative for adenopathy  Psychiatric/Behavioral: Negative for sleep disturbance  All other systems reviewed and are negative  Video Exam    There were no vitals filed for this visit  It was my intent to perform this visit via video technology but the patient was not able to do a video connection so the visit was completed via audio telephone only  I spent 45 minutes directly with the patient during this visit      VIRTUAL VISIT DISCLAIMER    Nadia Maddison acknowledges that she has consented to an online visit or consultation   She understands that the online visit is based solely on information provided by her, and that, in the absence of a face-to-face physical evaluation by the physician, the diagnosis she receives is both limited and provisional in terms of accuracy and completeness  This is not intended to replace a full medical face-to-face evaluation by the physician  Génesis Guillaume understands and accepts these terms

## 2021-04-09 NOTE — ASSESSMENT & PLAN NOTE
Review of pump/sensor shows highly variable BG with hypo ans hyperglycemia due to dietary inconsistencies, issues with meal boluses given her gastroparesis, and issues with insulin absorptions  Patient requires diabetes education to help improve her boluses and MNT for diabetes and gastroparesis  For now will change her carb ratios to 7 5 to help provide more consistency  Change correction factor at 12 am to 40, 3 am to 30, and 12 pm to 40  She will upload pump/sensor report in one week for review   She will require close monitoring of her BG as she will be started on steroids likely on 4/15 for upcoming sinus surgery 4/22

## 2021-05-17 ENCOUNTER — OFFICE VISIT (OUTPATIENT)
Dept: DIABETES SERVICES | Facility: CLINIC | Age: 46
End: 2021-05-17
Payer: COMMERCIAL

## 2021-05-17 DIAGNOSIS — E10.3599 TYPE 1 DIABETES MELLITUS WITH PROLIFERATIVE RETINOPATHY WITHOUT MACULAR EDEMA, UNSPECIFIED LATERALITY (HCC): ICD-10-CM

## 2021-05-17 DIAGNOSIS — E03.9 ACQUIRED HYPOTHYROIDISM: ICD-10-CM

## 2021-05-17 PROCEDURE — G0108 DIAB MANAGE TRN  PER INDIV: HCPCS

## 2021-05-17 RX ORDER — LEVOTHYROXINE SODIUM 88 UG/1
TABLET ORAL
Qty: 90 TABLET | Refills: 1 | Status: SHIPPED | OUTPATIENT
Start: 2021-05-17 | End: 2021-10-07 | Stop reason: SDUPTHER

## 2021-05-17 NOTE — PROGRESS NOTES
Ronak Anaya has been on steroid post her nasal surgery  She has been decreased to 15 mg and anticipates a reduction after her next appt  She is taking the steroid at night and has had her glucose trending higher on the steroids  She is familiar with temp basal , she has entered a new  Basal pattern and has increased her basal rates  Her highest glucoses are over night  She feel that this is working better for her than setting  Temp basal   I also discussed that she could set a tep basal for overnight only  But this would need to be set daily  Ronak Anaya has a history of gastroparesis and she is familiar and has used the dual and extended bolus  Currently she is waiting until her glucoses elevate and then will give a normal bolus  I downloaded her pump and the clarity report and we looked at her pattern when she was eating  It looks like she is elevated for 3 - 3/12 hours after her meals  I discussed with her trying to use the extended bolus and set for 3 hours  She understands that this may not work with all the foods the same  She is using the Medtronic 670 with the DexCom sensor  She did not find the automode feature beneficial and had too many problems with the medtronic sensor  She had tried the Tandem pump but she  can only use Apidra, which is contraindicated in the Tandem pump  I did briefly discuss with her the 770 and the feature of the 780 once that is approved  She is to call if she has any questions

## 2021-06-03 ENCOUNTER — OFFICE VISIT (OUTPATIENT)
Dept: OBGYN CLINIC | Facility: CLINIC | Age: 46
End: 2021-06-03
Payer: COMMERCIAL

## 2021-06-03 VITALS
DIASTOLIC BLOOD PRESSURE: 60 MMHG | HEIGHT: 64 IN | BODY MASS INDEX: 29.37 KG/M2 | SYSTOLIC BLOOD PRESSURE: 120 MMHG | WEIGHT: 172 LBS

## 2021-06-03 DIAGNOSIS — N80.9 ENDOMETRIOSIS DETERMINED BY LAPAROSCOPY: ICD-10-CM

## 2021-06-03 DIAGNOSIS — N32.89 BLADDER SPASMS: ICD-10-CM

## 2021-06-03 DIAGNOSIS — N95.1 VASOMOTOR SYMPTOMS DUE TO MENOPAUSE: ICD-10-CM

## 2021-06-03 DIAGNOSIS — Z12.31 ENCOUNTER FOR SCREENING MAMMOGRAM FOR BREAST CANCER: Primary | ICD-10-CM

## 2021-06-03 LAB
SL AMB  POCT GLUCOSE, UA: NEGATIVE
SL AMB LEUKOCYTE ESTERASE,UA: NEGATIVE
SL AMB POCT BILIRUBIN,UA: NEGATIVE
SL AMB POCT BLOOD,UA: NEGATIVE
SL AMB POCT CLARITY,UA: NORMAL
SL AMB POCT COLOR,UA: YELLOW
SL AMB POCT KETONES,UA: NEGATIVE
SL AMB POCT NITRITE,UA: NEGATIVE
SL AMB POCT PH,UA: 5
SL AMB POCT SPECIFIC GRAVITY,UA: 1000
SL AMB POCT URINE PROTEIN: NEGATIVE
SL AMB POCT UROBILINOGEN: NORMAL

## 2021-06-03 PROCEDURE — 81002 URINALYSIS NONAUTO W/O SCOPE: CPT | Performed by: OBSTETRICS & GYNECOLOGY

## 2021-06-03 PROCEDURE — 99396 PREV VISIT EST AGE 40-64: CPT | Performed by: OBSTETRICS & GYNECOLOGY

## 2021-06-03 NOTE — PROGRESS NOTES
Assessment/Plan:  Normal breast exam  Normal gyn exam  Menopause symptoms including hot flashes  Bladder spasms after intercourse  History of endometriosis  LAVH RSO 8 years ago  History of bilateral renal stones treated with stents  Recent diagnosis of sarcoid disease in her sinuses and skin  Small lesions in her lungs  Insulin-dependent diabetic    Plan:  Check FSH  If normal, place on [place] for several months to see if bladder symptoms resolved  Patient was given information on on this medication and told that it suppresses the ovary to treat endometriosis  If bladder spasms do not resolve, refer back to Urology (last seen in 2017)    Continue healthy diet exercise  Subjective:      Patient ID: Lennice Burkitt is a 40 y o  female presents for yearly exam complaining of bladder spasms after intercourse  Patient has a history of endometriosis and a LAVH RSO 8 years ago  Patient denies any pelvic pain otherwise  Patient denies any bladder or bowel issues  Patient is insulin dependent on a pump  Blood sugars have not been well controlled recently  Patient was recently diagnosed with sarcoid disease see above  Review of Systems   Constitutional: Negative  Negative for fatigue, fever and unexpected weight change  HENT: Negative  Eyes: Negative  Respiratory: Negative  Negative for chest tightness, shortness of breath, wheezing and stridor  Cardiovascular: Negative  Negative for chest pain, palpitations and leg swelling  Gastrointestinal: Negative  Negative for abdominal pain, blood in stool, diarrhea, nausea, rectal pain and vomiting  Endocrine: Negative  Genitourinary: Negative for dysuria, frequency, vaginal bleeding, vaginal discharge and vaginal pain  Bladder spasms after intercourse   Musculoskeletal: Negative  Skin: Negative  Allergic/Immunologic: Negative  Neurological: Negative  Hematological: Negative  Psychiatric/Behavioral: Negative      All other systems reviewed and are negative  Objective:      /60   Ht 5' 4 37" (1 635 m)   Wt 78 kg (172 lb)   BMI 29 19 kg/m²          Physical Exam  Constitutional:       Appearance: She is well-developed  Cardiovascular:      Rate and Rhythm: Normal rate and regular rhythm  Heart sounds: Normal heart sounds  Pulmonary:      Effort: Pulmonary effort is normal  No respiratory distress  Breath sounds: No stridor  No wheezing or rales  Chest:      Chest wall: No tenderness  Breasts: Breasts are symmetrical          Right: No inverted nipple, mass, nipple discharge, skin change or tenderness  Left: No inverted nipple, mass, nipple discharge, skin change or tenderness  Abdominal:      General: Bowel sounds are normal  There is no distension  Palpations: Abdomen is soft  There is no mass  Tenderness: There is no abdominal tenderness  There is no guarding or rebound  Hernia: No hernia is present  There is no hernia in the left inguinal area  Genitourinary:     Labia:         Right: No rash, tenderness, lesion or injury  Left: No rash, tenderness, lesion or injury  Vagina: Normal  No signs of injury and foreign body  No vaginal discharge, erythema, tenderness or bleeding  Adnexa:         Right: No mass, tenderness or fullness  Left: No mass, tenderness or fullness  Rectum: No mass, tenderness, anal fissure, external hemorrhoid or internal hemorrhoid  Normal anal tone  Comments: Urethral meatus normal   Cervix uterus and right tube and ovary absent  Vaginal cuff well supported  No cystocele or rectocele  No pelvic tenderness  No adnexal masses  Lymphadenopathy:      Lower Body: No right inguinal adenopathy  No left inguinal adenopathy  Psychiatric:         Behavior: Behavior normal          Thought Content:  Thought content normal          Judgment: Judgment normal

## 2021-06-03 NOTE — PROGRESS NOTES
The patient is here for a yearly  The patient has bladder spasms/ pain that started two-three years after the hysterectomy  She has seen a urologist but hasn't seen one since 2017  No urinary burning or blood in the urine  The patient is also having menopausal symptoms  She has been having a lot of hot flashes on and off for a year  No bleeding  No breast or bowel problems  The patient has not been to our office since 2016

## 2021-07-01 ENCOUNTER — TELEPHONE (OUTPATIENT)
Dept: UROLOGY | Facility: AMBULATORY SURGERY CENTER | Age: 46
End: 2021-07-01

## 2021-07-01 NOTE — TELEPHONE ENCOUNTER
Patient managed by Rosangela Chery is having painful urination and burning starting Tuesday night  She is now having gross hematuria  She is requesting same day or next day appointment

## 2021-07-01 NOTE — TELEPHONE ENCOUNTER
Contacted pt regarding her most recent symptoms today of blood in her urine and  painful urination  Pt was last seen 3 years ago and would be considered a new patient  Advised pt to contact her pcp for urine testing and further evaluation  Advised pt to follow up with urology as needed  Pt is in agreement will contact her pcp now  Please be advised

## 2021-08-30 ENCOUNTER — TELEPHONE (OUTPATIENT)
Dept: ENDOCRINOLOGY | Facility: CLINIC | Age: 46
End: 2021-08-30

## 2021-08-30 NOTE — TELEPHONE ENCOUNTER
Needs refill on dexcom transmitter and sensors 3 month supply called into [facility]  Last seen 4-8-2021 and fu 10-7-2021    Thank you

## 2021-08-31 DIAGNOSIS — E10.3599 TYPE 1 DIABETES MELLITUS WITH PROLIFERATIVE RETINOPATHY WITHOUT MACULAR EDEMA, UNSPECIFIED LATERALITY (HCC): Primary | ICD-10-CM

## 2021-08-31 RX ORDER — BLOOD-GLUCOSE TRANSMITTER
EACH MISCELLANEOUS
Qty: 1 EACH | Refills: 3 | Status: SHIPPED | OUTPATIENT
Start: 2021-08-31

## 2021-08-31 RX ORDER — BLOOD-GLUCOSE SENSOR
EACH MISCELLANEOUS
Qty: 9 EACH | Refills: 3 | Status: SHIPPED | OUTPATIENT
Start: 2021-08-31

## 2021-08-31 NOTE — TELEPHONE ENCOUNTER
Needs refill on dexcom transmitter and sensors 3 month supply called into [facility]  Last seen 4-8-2021 and fu 10-7-2021    Thank you

## 2021-08-31 NOTE — TELEPHONE ENCOUNTER
Hello,  Thank you for renewing my Rx for G6 transmitter, I also need an Rx for the sensors as well   If you could, please call those in as well I would greatly appreciate that      Thank you,  Aylin Wallace

## 2021-09-02 ENCOUNTER — TELEPHONE (OUTPATIENT)
Dept: ENDOCRINOLOGY | Facility: CLINIC | Age: 46
End: 2021-09-02

## 2021-09-08 NOTE — OP NOTE
OPERATIVE REPORT  PATIENT NAME: Tiffanie Fontanez    :  1975  MRN: [id number]  Pt Location: [facility] OR ROOM 07    SURGERY DATE: 2017    Surgeon(s) and Role:     * Nitza Cortez MD - Primary    Preop Diagnosis:  Calculus of ureter [N20 1]    Post-Op Diagnosis Codes:     * Calculus of ureter [N20 1]    Procedure(s) (LRB):  CYSTOSCOPY URETEROSCOPY,  RETROGRADE PYELOGRAM AND INSERTION STENT URETERAL (Left)    Specimen(s):  * No specimens in log *    Estimated Blood Loss:   Minimal    Drains:       Anesthesia Type:   General    Operative Indications:  Calculus of ureter [N20 1]  Upper ureteral strictures    Operative Findings: The ureter was easily traversed with an access sheath  No stones were identified in the renal collecting system  Papillary necrosis was noted with some Ethan's plaques  No stone was identified in the ureter  Complications:   None    Procedure and Technique:  The patient was brought to the operating room identified  A general anesthetic was administered  She was placed in lithotomy position prepped and draped in sterile fashion  A time-out was performed  The 25 Niuean cystoscope sheath-30 degree telescope was inserted under direct vision  The urethra was normal   The bladder showed no changes from the prior exam   The end of the stent emerging from the left ureteral orifice was grasped and removed through the urethral meatus  As the stent was removed, there was a shower of crystalline debris  Some of this was caked on the side of the stent, but some of it may represent the ureteral stone as well  A guidewire was passed through the stent to the level of the kidney under fluoroscopic control  The stent was removed  A double-lumen catheter was inserted and an Amplatz stiff wire was passed to the level of the kidney  After removing the double lumen catheter, the ureter was dilated with the access sheath obturator followed by the access sheath proper    The flexible ureteral scope was then passed without difficulty  Contrast was injected  Each calyx was inspected in turn  Ethan's plaques were noted scattered through the kidney  There was also evidence of some papillary necrosis, likely related to the patient's diabetes  No calculi were identified in the collecting system  As the scope was withdrawn, the ureter was inspected and no calculi were identified there either  The remaining wire was backloaded through the cystoscope and a 6 [employer] Sherie Kwart stent was positioned with 3 coils in the renal pelvis and 1 coil in the urinary bladder  Final fluoroscopic images confirmed stent position  The procedure was completed  The patient tolerated well was sent to recovery in satisfactory condition  There was no blood loss     I was present for the entire procedure    Patient Disposition:  PACU     SIGNATURE: Nicola Newton MD  DATE: December 28, 2017  TIME: 12:06 PM cellphone/Clothing

## 2021-10-07 ENCOUNTER — TELEMEDICINE (OUTPATIENT)
Dept: ENDOCRINOLOGY | Facility: CLINIC | Age: 46
End: 2021-10-07
Payer: COMMERCIAL

## 2021-10-07 ENCOUNTER — TELEPHONE (OUTPATIENT)
Dept: ENDOCRINOLOGY | Facility: CLINIC | Age: 46
End: 2021-10-07

## 2021-10-07 DIAGNOSIS — E10.3599 TYPE 1 DIABETES MELLITUS WITH PROLIFERATIVE RETINOPATHY WITHOUT MACULAR EDEMA, UNSPECIFIED LATERALITY (HCC): Primary | ICD-10-CM

## 2021-10-07 DIAGNOSIS — E03.9 ACQUIRED HYPOTHYROIDISM: ICD-10-CM

## 2021-10-07 DIAGNOSIS — Z96.41 INSULIN PUMP STATUS: ICD-10-CM

## 2021-10-07 PROCEDURE — 95251 CONT GLUC MNTR ANALYSIS I&R: CPT | Performed by: INTERNAL MEDICINE

## 2021-10-07 PROCEDURE — 99214 OFFICE O/P EST MOD 30 MIN: CPT | Performed by: INTERNAL MEDICINE

## 2021-10-07 RX ORDER — LEVOTHYROXINE SODIUM 88 UG/1
88 TABLET ORAL DAILY
Qty: 90 TABLET | Refills: 2 | Status: SHIPPED | OUTPATIENT
Start: 2021-10-07

## 2021-11-23 ENCOUNTER — TELEPHONE (OUTPATIENT)
Dept: ENDOCRINOLOGY | Facility: CLINIC | Age: 46
End: 2021-11-23

## 2021-12-12 DIAGNOSIS — Z96.41 INSULIN PUMP STATUS: ICD-10-CM

## 2021-12-12 DIAGNOSIS — E10.3599 TYPE 1 DIABETES MELLITUS WITH PROLIFERATIVE RETINOPATHY WITHOUT MACULAR EDEMA, UNSPECIFIED LATERALITY (HCC): ICD-10-CM

## 2021-12-21 RX ORDER — INSULIN ASPART INJECTION 100 [IU]/ML
INJECTION, SOLUTION SUBCUTANEOUS
Qty: 15 ML | OUTPATIENT
Start: 2021-12-21

## 2022-04-07 ENCOUNTER — TELEPHONE (OUTPATIENT)
Dept: OBGYN CLINIC | Facility: CLINIC | Age: 47
End: 2022-04-07

## 2022-04-07 NOTE — TELEPHONE ENCOUNTER
Pt called wanting to know if she was reading her [facility] test correctly that she is post menopausal   She is having hot flashes and if she wasn't post menopausal what could be causing them and if she is post menopausal how long could the hot flashes last  Please advise

## 2022-05-09 LAB
LEFT EYE DIABETIC RETINOPATHY: NORMAL
RIGHT EYE DIABETIC RETINOPATHY: NORMAL

## 2022-08-31 ENCOUNTER — OFFICE VISIT (OUTPATIENT)
Dept: ENDOCRINOLOGY | Facility: CLINIC | Age: 47
End: 2022-08-31
Payer: COMMERCIAL

## 2022-08-31 VITALS
WEIGHT: 191.5 LBS | SYSTOLIC BLOOD PRESSURE: 116 MMHG | BODY MASS INDEX: 33.93 KG/M2 | DIASTOLIC BLOOD PRESSURE: 62 MMHG | HEIGHT: 63 IN | HEART RATE: 84 BPM

## 2022-08-31 DIAGNOSIS — Z79.52 CURRENT CHRONIC USE OF SYSTEMIC STEROIDS: ICD-10-CM

## 2022-08-31 DIAGNOSIS — E10.3599 TYPE 1 DIABETES MELLITUS WITH PROLIFERATIVE RETINOPATHY WITHOUT MACULAR EDEMA, UNSPECIFIED LATERALITY (HCC): ICD-10-CM

## 2022-08-31 DIAGNOSIS — E10.42 DIABETIC POLYNEUROPATHY ASSOCIATED WITH TYPE 1 DIABETES MELLITUS (HCC): ICD-10-CM

## 2022-08-31 DIAGNOSIS — E10.69 TYPE 1 DIABETES MELLITUS WITH OTHER SPECIFIED COMPLICATION (HCC): Primary | ICD-10-CM

## 2022-08-31 DIAGNOSIS — Z96.41 INSULIN PUMP STATUS: ICD-10-CM

## 2022-08-31 DIAGNOSIS — E10.69 TYPE 1 DIABETES MELLITUS WITH OTHER SPECIFIED COMPLICATION (HCC): ICD-10-CM

## 2022-08-31 LAB — SL AMB POCT HEMOGLOBIN AIC: 8.9 (ref ?–6.5)

## 2022-08-31 PROCEDURE — 99214 OFFICE O/P EST MOD 30 MIN: CPT | Performed by: INTERNAL MEDICINE

## 2022-08-31 PROCEDURE — 95251 CONT GLUC MNTR ANALYSIS I&R: CPT | Performed by: INTERNAL MEDICINE

## 2022-08-31 PROCEDURE — 83036 HEMOGLOBIN GLYCOSYLATED A1C: CPT | Performed by: INTERNAL MEDICINE

## 2022-08-31 RX ORDER — SYRINGE-NEEDLE,INSULIN,0.5 ML 27GX1/2"
SYRINGE, EMPTY DISPOSABLE MISCELLANEOUS 3 TIMES DAILY
Qty: 100 EACH | Refills: 1 | Status: SHIPPED | OUTPATIENT
Start: 2022-08-31

## 2022-08-31 RX ORDER — PREDNISONE 10 MG/1
TABLET ORAL
COMMUNITY
Start: 2022-08-26

## 2022-08-31 RX ORDER — DULOXETIN HYDROCHLORIDE 30 MG/1
CAPSULE, DELAYED RELEASE ORAL DAILY
COMMUNITY
Start: 2022-08-31

## 2022-08-31 RX ORDER — INSULIN GLARGINE 100 [IU]/ML
INJECTION, SOLUTION SUBCUTANEOUS
Qty: 10 ML | Refills: 1 | Status: SHIPPED | OUTPATIENT
Start: 2022-08-31 | End: 2022-08-31

## 2022-08-31 NOTE — PATIENT INSTRUCTIONS
Please change your prednisone to the morning by taking 1/2 tablet tonight and 1/2 tab in the morning of 9/1/22  Then beginning on 9/2/22, take the 10mg of prednisone in the morning

## 2022-08-31 NOTE — PROGRESS NOTES
Est Patient Progress Note      Chief Complaint   Patient presents with    Diabetes Type 1      Referring Provider  No referring provider defined for this encounter  History of Present Illness: Lincoln Brunner is a 56 y o  female with a history of type 1 diabetes seen in f/u  Last seen by Telemed in 10/2021     HPI   Dx with type 1 diabetes at age 12  Reports complications of retinopathy, neuropathy, and gastroparesis  She is currently using medtronic 670G with dexcom G6 sensor  Reports are downloaded    She continues taking prednisone 10mg once daily  She takes this at night       She also reports insertion site/absorption issues due to scar tissue  She still boluses after her meals due to her gastroparesis  Denies recent illness or hospitalizations  Denies recent severe hypoglycemic or severe hyperglycemic episodes  Denies any issues with his current regimen  Uses her Dexcom correctly  She is interested in OmniPod 5 as her pump warrenty is done       Patient is on a medtronic pump prescribed by endocrinology  She has been on a pump for over 20 years  She denies any malfunctioning of the pump       Current Insulin pump settings:  Basal 2 setting is active  MN-2am 1 5  2a-7am 1 65  7a-9a 1 45  9a- 12p 1 30  12p-1630 1 25  8868-2202 1 10  1900-mn 1 45  Total basal 33 675 units  Does not have back-up basal     Bolus  ICR 1:7 0  ISF  MN-3a 40  3a-noon 30  12p-MN 40   AIT 3h  Type of insulin:  Apidra              Ethyl Po   Device used - Dexcom G6  Home use         Indication   Type 1 Diabetes     More than 72 hours of data was reviewed   Report to be scanned to chart       Date Range: 8/18/22-8/31/22     Analysis of data:   Average Glucose: 227  Coefficient of Variation: n/a  SD : 67  Time in Target Range: 25%  Time Above Range: 36% high, V high 38  Time Below Range: <1%   GMI 8 7%     Interpretation of data:   Consistent elevation overnight likely related to taking prednisone at night         compliant all of the time, denies any side effects from current medications  Has subcutaneous deposits from insulin use  Hypoglycemic episodes: Infrequent, has not used glucagon  H/o of hypoglycemia causing hospitalization or Intervention such as glucagon injection or ambulance call No   Hypoglycemia symptoms: dizziness, sweatings, palpitations, and diffculty breathing  Will get vision changes with severe lows  Treatment of hypoglycemia: orange juice      Eyes: Dr Khris Callaway; several procedures (lasers, injections, and vitrectomy)   Had an exam in either June or July  Pod: Dr Crystal Johnson but not seen recent  Dentist: went earlier in 2022  COVID: declines; had COVID in Jan 2022  Flu: does not get   Exercise: house work; otherwise limited     Has hyperlipidemia: Taking Rosuvastatin  Has hypothyroidism: Taking Levothyroxine 88 mcg    Patient Active Problem List   Diagnosis    Left flank pain    Diabetic polyneuropathy associated with type 1 diabetes mellitus ([facility] [address] [facility]    Dyslipidemia    Hypothyroidism    Insulin pump status    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 ([facility] [address] [facility]    Sarcoidosis    Medical marijuana use    Personal history of kidney stones    Premature ventricular contractions    Trigger finger of left thumb    Pulmonary nodules    Current chronic use of systemic steroids      Past Medical History:   Diagnosis Date    Allergic rhinitis     Anxiety     Arthritis     osteo    Asthma     borderline    Chronic pain disorder     right knee, lower back    Depression     Diabetes mellitus (HCC)     Type 1 x 36yrs,,"on insulin pump for approx 17 yrs or so"    Disease of thyroid gland     hypothyroid    Dry eyes, bilateral     Fatigue     GERD (gastroesophageal reflux disease)     gastroparesis    History of UTI     Kidney stone     several, hx of stent    Motion sickness     Nausea     Neuropathy     Neuropathy     PONV (postoperative nausea and vomiting)     "pt reports scopalamine patch does help"    Presence of surgical incision     "small biopsy site with steri and bandaid left outer thigh per pt report"    Pulmonary nodule     PVC (premature ventricular contraction)     Sarcoidosis     "is currently being tested to see if has it"    Status post right partial knee replacement     right    Tooth missing     Wears glasses       Past Surgical History:   Procedure Laterality Date    APPENDECTOMY      CARPAL TUNNEL RELEASE Bilateral      SECTION      CYSTOSCOPY W/ LASER LITHOTRIPSY Left 2017    Procedure: CYSTOSCOPY, LEFT RETROGRADE PYELOGRAM LEFT URETEROSCOPY,  LEFT STENT INSERTION;  Surgeon: Kumar De Santiago MD;  [facility]: [facility];  Service: Urology    DILATION AND CURETTAGE OF UTERUS      FINGER SURGERY      HYSTERECTOMY      JOINT REPLACEMENT Right     partial knee    KNEE ARTHROSCOPY Right     x4    KNEE SURGERY      NM CYSTO/URETERO W/LITHOTRIPSY &INDWELL STENT INSRT Left 2017    Procedure: CYSTOSCOPY URETEROSCOPY,  RETROGRADE PYELOGRAM AND INSERTION STENT URETERAL;  Surgeon: Kumar De Santiago MD;  [facility];  Service: Urology    REMOVAL URETERAL STENT  2017    SKIN BIOPSY Left     outer thigh    TRIGGER FINGER RELEASE      mul    URETERAL STENT PLACEMENT      VITRECTOMY Bilateral     right eye x2 and left x1    WISDOM TOOTH EXTRACTION        Family History   Problem Relation Age of Onset    Arthritis Mother     Asthma Mother     Heart disease Mother     COPD Father     Heart disease Father     Hyperlipidemia Father     Hypertension Father     Diabetes Father     Diabetes type II Father     Hypothyroidism Father     Diabetes type I Sister     Breast cancer Sister     Hashimoto's thyroiditis Daughter     Diabetes type I Sister      Social History     Tobacco Use    Smoking status: Former Smoker     Packs/day: 0 25     Years: 10 00     Pack years: 2 50     Types: Cigarettes    Smokeless tobacco: Never Used    Tobacco comment: last had cigarette two months ago   Substance Use Topics    Alcohol use: Yes     Comment: On occasion, not daily     Allergies   Allergen Reactions    Insulin Aspart Abdominal Pain     Humulog and Novulog  Welts under skin and insulin resistance  Welts under skin and insulin resistance  Other reaction(s): Other  Welts under skin and insulin resistance    Morphine Other (See Comments)     Other reaction(s):  Other  pain  Intensifies pain    Iodinated Diagnostic Agents Hives     Pt can tolerate shellfish, pt can tolerate betadine    Levofloxacin     Oxycodone GI Intolerance     Pt is able to tolerate hydrocodone         Current Outpatient Medications:     ASPIRIN 81 PO, aspirin 81 mg tablet,delayed release, Disp: , Rfl:     budesonide-formoterol (SYMBICORT) 160-4 5 mcg/act inhaler, Inhale 2 puffs daily , Disp: , Rfl:     Continuous Blood Gluc Sensor (Dexcom G6 Sensor) MISC, CHANGE EVERY 10 DAYS, Disp: 9 each, Rfl: 0    Continuous Blood Gluc Transmit (Dexcom G6 Transmitter) MISC, Change every 90 days, Disp: 1 each, Rfl: 3    DULoxetine (CYMBALTA) 30 mg delayed release capsule, in the morning, Disp: , Rfl:     Glucagon 3 MG/DOSE POWD, Baqsimi 3 mg/actuation nasal spray  USE FOR SEVERE HYPOGLYCEMIA, Disp: , Rfl:     Insulin Glargine-yfgn (Semglee, yfgn,) 100 UNIT/ML SOLN, Use 30units once daily if pump fails, Disp: 10 mL, Rfl: 1    insulin glulisine (APIDRA) 100 units/mL, Use with insulin pump 80-100units per day with pump, Disp: 9 mL, Rfl: 3    Insulin Syringe 27G X 1/2" 1 ML MISC, Use 3 (three) times a day Use in case of pump failure, Disp: 100 each, Rfl: 1    levothyroxine 88 mcg tablet, Take 1 tablet (88 mcg total) by mouth daily, Disp: 90 tablet, Rfl: 2    metoprolol succinate (TOPROL-XL) 25 mg 24 hr tablet, Take 25 mg by mouth every evening, Disp: , Rfl:     montelukast (SINGULAIR) 10 mg tablet, Take 10 mg by mouth daily at bedtime, Disp: , Rfl:     NON FORMULARY, Medtronic pump, Disp: , Rfl:     predniSONE 10 mg tablet, TAKE 2 TABLETS (20MG) BY MOUTH DAILY UNTIL DIRECTED OTHERWISE IN CLINIC, Disp: , Rfl:     rosuvastatin (CRESTOR) 20 MG tablet, TAKE 1 TABLET BY MOUTH EVERY DAY AT NIGHT, Disp: , Rfl:     SERTRALINE HCL PO, Take 200 mg by mouth daily, Disp: , Rfl:   Review of Systems   Constitutional: Positive for unexpected weight change  HENT: Negative for hearing loss, trouble swallowing and voice change  Eyes: Positive for visual disturbance  Cardiovascular: Negative for chest pain  Gastrointestinal: Negative for diarrhea and nausea  Musculoskeletal: Negative for gait problem  Neurological: Negative for tremors  Psychiatric/Behavioral: The patient is not nervous/anxious  see also HPI    Physical Exam:  Body mass index is 33 92 kg/m²  /62   Pulse 84   Ht 5' 3" (1 6 m)   Wt 86 9 kg (191 lb 8 oz)   BMI 33 92 kg/m²    Wt Readings from Last 3 Encounters:   08/31/22 86 9 kg (191 lb 8 oz)   06/03/21 78 kg (172 lb)   11/11/20 78 5 kg (173 lb)       Physical Exam   Gen: appears well-developed and well-nourished  No apparent distress  Head: Normocephalic and atraumatic  Eyes: no stare or proptosis, no periorbital edema  E/N/M nl facies, hearing grossly intact  Neck: range of motion  Pulmonary/Chest: breathing  comfortably, no accessory muscle use, effort normal    Musculoskeletal: moves all 4 extremities, gait nl  Neurological: alert and oriented to person, place, and time  No upper ext tremor appreciated  Skin: does not appear diaphoretic, no facial plethora  Psychiatric: normal mood and affect; behavior is normal; no gross lapses in memory, answer questions appropriately    Patient's shoes and socks removed  Right Foot/Ankle   Right Foot Inspection  Skin Exam: skin normal, skin intact, callus and callus  No dry skin, no warmth, no erythema, no maceration, no abnormal color, no pre-ulcer and no ulcer       Toe Exam: ROM and strength within normal limits  Sensory   Vibration: intact  Monofilament testing: intact    Vascular  Capillary refills: < 3 seconds  The right DP pulse is 2+  Left Foot/Ankle  Left Foot Inspection  Skin Exam: skin normal, skin intact and callus  No dry skin, no warmth, no erythema, no maceration, normal color, no pre-ulcer and no ulcer  Toe Exam: ROM and strength within normal limits  Sensory   Vibration: intact  Monofilament testing: intact    Vascular  Capillary refills: < 3 seconds  The left DP pulse is 2+  Assign Risk Category  No deformity present  No loss of protective sensation  No weak pulses  Risk: 0        Labs:         Lab Results   Component Value Date    CREATININE 1 00 01/02/2018    CREATININE 0 90 12/10/2017    BUN 20 01/02/2018    K 3 9 01/02/2018    CL 93 (L) 01/02/2018    CO2 24 01/02/2018     eGFR   Date Value Ref Range Status   01/02/2018 70 ml/min/1 73sq m Final     No components found for: MALBCRER    No results found for: CHOL, HDL, TRIG, CHOLHDL    Lab Results   Component Value Date    ALT 40 01/02/2018    AST 75 (H) 01/02/2018    ALKPHOS 105 01/02/2018       No results found for: TSH, FREET4, TSI    Impression:  1  Type 1 diabetes mellitus with other specified complication ([facility] [address] )    2  Insulin pump status    3  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, unspecified laterality ([facility] [address] )    4  Diabetic polyneuropathy associated with type 1 diabetes mellitus ([facility] [address] )    5  Current chronic use of systemic steroids           Plan:    Trecia Goldmann was seen today for diabetes type 1      Diagnoses and all orders for this visit:    Type 1 diabetes mellitus with other specified complication ([facility] [address] )  -     POCT hemoglobin A1c  -     Discontinue: insulin glargine (Lantus) 100 units/mL subcutaneous injection; Use 30units subcut once daily as basal if pump fails  -     Insulin Syringe 27G X 1/2" 1 ML MISC; Use 3 (three) times a day Use in case of pump failure    Insulin pump status    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, unspecified laterality (HCC)    Diabetic polyneuropathy associated with type 1 diabetes mellitus ([facility] [address] )    Current chronic use of systemic steroids      1  Type 1 diabetes mellitus with other specified complication (HCC)     - POCT hemoglobin A1c    1  T1DM, uncontrolled, complicated by retinopathy: She is interested in the OmniPod 5  She has an updated note and A1c  Can proceed with upgrade  2  Dyslipidemia: on rosuvastatin    3  Hypothyroidism: euthyroid on levothyroxine 88mcg daily    4  Chronic steroids: Discussed moving her prednisone to the morning  Reviewed physiology and my concern about suppressing adrenal function with timing her prednisone at night  Advised moving to the morning  Also reviewed that her overnight basal rates are likely higher due to steroids at night and may decrease once her meds move  She can account for prednisone during the day with correction factor  Discussed with the patient and all questioned fully answered  She will call me if any problems arise      Counseled patient on diagnostic results, prognosis, risk and benefit of treatment options, instruction for management, importance of treatment compliance, Risk  factor reduction and impressions      Constance Mcpherson MD

## 2022-09-01 ENCOUNTER — TELEPHONE (OUTPATIENT)
Dept: ADMINISTRATIVE | Facility: OTHER | Age: 47
End: 2022-09-01

## 2022-09-01 RX ORDER — INSULIN DEGLUDEC 100 U/ML
INJECTION, SOLUTION SUBCUTANEOUS
Qty: 10 ML | Refills: 2 | Status: SHIPPED | OUTPATIENT
Start: 2022-09-01

## 2022-09-01 NOTE — TELEPHONE ENCOUNTER
Upon review of the In Basket request we were able to locate, review, and update the patient chart as requested for Diabetic Eye Exam     Any additional questions or concerns should be emailed to the Practice Liaisons via [email or web address] email, please do not reply via In Basket      Thank you  Sherryle Breen, MA

## 2022-09-01 NOTE — TELEPHONE ENCOUNTER
Upon review of the In Basket request and the patient's chart, initial outreach has been made via fax, please see Contacts section for details            Thank you  Ian Ma MA

## 2022-09-01 NOTE — LETTER
Diabetic Eye Exam Form    Date Requested: 22  Patient: Lexy Fitchlim  Patient : 1975   Referring Provider: Taina Kincaid Exam Date _______________________________    Type of Exam MUST be documented for Diabetic Eye Exams  Please CHECK ONE  Retinal Exam       Dilated Retinal Exam       OCT       Optomap-Iris Exam      Fundus Photography     Left Eye - Please check Retinopathy AND Type or No Retinopathy      Exam did show retinopathy    Exam did not show retinopathy         Mild     Proliferative           Moderate    Severe            None         Right Eye - Please check Retinopathy AND Type or No Retinopathy     Exam did show retinopathy    Exam did not show retinopathy         Mild     Proliferative        Moderate    Severe        None       Comments __________________________________________________________    Practice Providing Exam ______________________________________________    Exam Performed By (print name) _______________________________________      Provider Signature ___________________________________________________    These reports are needed for  compliance  Please fax this completed form and a copy of the Diabetic Eye Exam report to our office located at [place] [address] as soon as possible via [phone] ginette Jason: Phone [phone]  We thank you for your assistance in treating our mutual patient

## 2022-09-01 NOTE — TELEPHONE ENCOUNTER
----- Message from Landingmichele Roberts sent at 8/31/2022  4:47 PM EDT -----  Regarding: HM DM EYE EXAM  08/31/22 4:47 PM    Hello, our patient Monica Dos Santos has had Diabetic Eye Exam completed/performed   Please assist in updating the patient chart by calling the office Dr Trista Devine  Thank you  Jo Hartley  [address] [facility]

## 2022-09-06 DIAGNOSIS — E10.69 TYPE 1 DIABETES MELLITUS WITH OTHER SPECIFIED COMPLICATION (HCC): Primary | ICD-10-CM

## 2022-09-06 RX ORDER — INSULIN PMP CART,AUT,G6/7,CNTR
1 EACH SUBCUTANEOUS
Qty: 30 EACH | Refills: 5 | Status: SHIPPED | OUTPATIENT
Start: 2022-09-06 | End: 2022-09-07 | Stop reason: SDUPTHER

## 2022-09-06 RX ORDER — INSULIN PMP CART,AUT,G6/7,CNTR
1 EACH SUBCUTANEOUS DAILY
Qty: 1 KIT | Refills: 0 | Status: SHIPPED | OUTPATIENT
Start: 2022-09-06 | End: 2022-09-07 | Stop reason: SDUPTHER

## 2022-09-07 DIAGNOSIS — E10.3599 TYPE 1 DIABETES MELLITUS WITH PROLIFERATIVE RETINOPATHY WITHOUT MACULAR EDEMA, UNSPECIFIED LATERALITY (HCC): ICD-10-CM

## 2022-09-07 DIAGNOSIS — E10.69 TYPE 1 DIABETES MELLITUS WITH OTHER SPECIFIED COMPLICATION (HCC): ICD-10-CM

## 2022-09-07 RX ORDER — BLOOD-GLUCOSE TRANSMITTER
EACH MISCELLANEOUS
Qty: 1 EACH | Refills: 3 | Status: SHIPPED | OUTPATIENT
Start: 2022-09-07

## 2022-09-07 RX ORDER — INSULIN PMP CART,AUT,G6/7,CNTR
EACH SUBCUTANEOUS
Qty: 30 EACH | Refills: 1 | Status: SHIPPED | OUTPATIENT
Start: 2022-09-07

## 2022-09-07 RX ORDER — INSULIN PMP CART,AUT,G6/7,CNTR
EACH SUBCUTANEOUS
Qty: 1 KIT | Refills: 0 | Status: SHIPPED | OUTPATIENT
Start: 2022-09-07 | End: 2022-10-03

## 2022-09-07 NOTE — TELEPHONE ENCOUNTER
----- Message from Adria Jefferson sent at 9/6/2022  4:32 PM EDT -----  Regarding: Omnipod5 Rx  Hello,  I saw that the script was sent to my local pharmacy for the [facility] kit and pods, thank you  Is it possible for you to send the scripts to my mail in [address] [facility]? The local one is out of stock and has to order, but I checked with [facility] and they have it in stock to ship directly to me  I have a feeling they don't keep those in stock on a regular basis! If you could I would greatly appreciate the switch      Thank you,   Bertrand Laurent

## 2022-09-07 NOTE — TELEPHONE ENCOUNTER
----- Message from Lexy Fox sent at 2022 10:07 AM EDT -----  Regarding: Dexcom g6 transmitter  Good morning! Thank you for switching my omnipod order to mail away  I realized I am in need of a new Rx for my transmitter  My current one is about to  on the , if you could be so kind as to send in a Rx to my mail away for a transmitter  I apologize for being a radha(pain in the ass)!    Thank you,  [facility]

## 2022-09-09 ENCOUNTER — TELEPHONE (OUTPATIENT)
Dept: DIABETES SERVICES | Facility: CLINIC | Age: 47
End: 2022-09-09

## 2022-09-09 DIAGNOSIS — E10.3599 TYPE 1 DIABETES MELLITUS WITH PROLIFERATIVE RETINOPATHY WITHOUT MACULAR EDEMA, UNSPECIFIED LATERALITY (HCC): Primary | ICD-10-CM

## 2022-09-09 NOTE — TELEPHONE ENCOUNTER
Patient called bc she got her omnipod 5 and I scheduled her with bobby for 9/19   Can you put in an education referral? Thank you

## 2022-09-19 ENCOUNTER — TELEPHONE (OUTPATIENT)
Dept: DIABETES SERVICES | Facility: CLINIC | Age: 47
End: 2022-09-19

## 2022-09-19 NOTE — TELEPHONE ENCOUNTER
Pt called to cancel todays appt for pump upgrade as her son is sick  ? COVID  Advised if she doesn't have sick s/s she could come to the office wearing a mask for the training  She then told me she watched all the video's and started omnipod 5 herself on Friday  Only issue she is having is she can't seem to get the raghu to work on her android phone  I suggested she call customer support  She notes documentation of her having education needs to be made    Advised she has not had education, and since she was changing from [employer] to [address], training needs to be completed by an educator    Appt cancelled and email sent to Johnie Mortensen regarding same    Pt also notes she has been using pumps for years and fel confident teaching herself

## 2022-10-02 DIAGNOSIS — E10.69 TYPE 1 DIABETES MELLITUS WITH OTHER SPECIFIED COMPLICATION (HCC): ICD-10-CM

## 2022-10-03 RX ORDER — INSULIN PMP CART,AUT,G6/7,CNTR
EACH SUBCUTANEOUS
Qty: 1 KIT | Refills: 0 | Status: SHIPPED | OUTPATIENT
Start: 2022-10-03

## 2022-10-04 DIAGNOSIS — E03.9 ACQUIRED HYPOTHYROIDISM: ICD-10-CM

## 2022-10-04 RX ORDER — LEVOTHYROXINE SODIUM 88 UG/1
TABLET ORAL
Qty: 90 TABLET | Refills: 2 | Status: SHIPPED | OUTPATIENT
Start: 2022-10-04

## 2022-10-24 DIAGNOSIS — E10.3599 TYPE 1 DIABETES MELLITUS WITH PROLIFERATIVE RETINOPATHY WITHOUT MACULAR EDEMA, UNSPECIFIED LATERALITY (HCC): ICD-10-CM

## 2022-10-24 DIAGNOSIS — Z96.41 INSULIN PUMP STATUS: ICD-10-CM

## 2022-10-24 RX ORDER — INSULIN GLULISINE 100 [IU]/ML
INJECTION, SOLUTION SUBCUTANEOUS
Qty: 90 ML | Refills: 3 | Status: SHIPPED | OUTPATIENT
Start: 2022-10-24

## 2022-11-20 DIAGNOSIS — E10.3599 TYPE 1 DIABETES MELLITUS WITH PROLIFERATIVE RETINOPATHY WITHOUT MACULAR EDEMA, UNSPECIFIED LATERALITY (HCC): ICD-10-CM

## 2022-11-21 RX ORDER — BLOOD-GLUCOSE SENSOR
EACH MISCELLANEOUS
Qty: 9 EACH | Refills: 0 | Status: SHIPPED | OUTPATIENT
Start: 2022-11-21

## 2022-12-05 ENCOUNTER — OFFICE VISIT (OUTPATIENT)
Dept: ENDOCRINOLOGY | Facility: CLINIC | Age: 47
End: 2022-12-05

## 2022-12-05 VITALS
WEIGHT: 192 LBS | BODY MASS INDEX: 34.02 KG/M2 | DIASTOLIC BLOOD PRESSURE: 62 MMHG | SYSTOLIC BLOOD PRESSURE: 120 MMHG | OXYGEN SATURATION: 98 % | HEIGHT: 63 IN | HEART RATE: 77 BPM

## 2022-12-05 DIAGNOSIS — E10.3599 TYPE 1 DIABETES MELLITUS WITH PROLIFERATIVE RETINOPATHY WITHOUT MACULAR EDEMA, UNSPECIFIED LATERALITY (HCC): Primary | ICD-10-CM

## 2022-12-05 DIAGNOSIS — E78.5 DYSLIPIDEMIA: ICD-10-CM

## 2022-12-05 DIAGNOSIS — E55.9 VITAMIN D DEFICIENCY: ICD-10-CM

## 2022-12-05 DIAGNOSIS — E03.9 ACQUIRED HYPOTHYROIDISM: ICD-10-CM

## 2022-12-05 LAB — SL AMB POCT HEMOGLOBIN AIC: 7.6 (ref ?–6.5)

## 2022-12-05 RX ORDER — DULOXETIN HYDROCHLORIDE 60 MG/1
60 CAPSULE, DELAYED RELEASE ORAL DAILY
COMMUNITY
Start: 2022-10-13 | End: 2022-12-05 | Stop reason: ALTCHOICE

## 2022-12-05 RX ORDER — GLUCAGON 3 MG/1
POWDER NASAL
Qty: 1 EACH | Refills: 1 | Status: SHIPPED | OUTPATIENT
Start: 2022-12-05

## 2022-12-05 RX ORDER — LEVOTHYROXINE SODIUM 0.1 MG/1
100 TABLET ORAL DAILY
COMMUNITY
Start: 2022-09-09

## 2022-12-05 NOTE — PROGRESS NOTES
Established Patient Progress Note      Chief Complaint   Patient presents with   • Diabetes Type 1        History of Present Illness: Nadia Wasbhurn is a 56 y o  female with a history of type 1 diabetes with long term use of insulin since age 12  Reports complications of retinopathy, neuropathy, and gastroparesis  Denies recent illness or hospitalizations  Denies recent severe hypoglycemic or severe hyperglycemic episodes  Denies any issues with her current regimen  home glucose monitoring: are performed regularly using Dexcom G6    Patient is on a Omnipod 5 pump prescribed by our office  She has been on a pump for many years but started this pump a few months ago  Previously used [employer]tronic  She is happy with the new pump  She denies any malfunctioning of the pump  Current Problems with Pump: Currently she can not get her transmitter to connect to her Omnipod 5 controller though dexcom still working on her cell phone Gladis  Of note, she did not meet with educator and started pump on her own with online modules  She is currently wearing Pod/Dexcom close together on the left deltoid region  Due to gastroparesis, she boluses after her meals after seeing glucose start to rise  Current Insulin pump settings:  Basal rate:12AM 1 0, 2AM 1 1, 7A 0 95, 9AM 1 15, 12PM 1 25, 4:30PM 0 95, 7PM 1 2  Insulin to carb ratio:12AM 7 5, 5PM 7  Insulin sensitivity factor:12AM 40, 3AM 30 12PM 40  BG target:120  Active Insulin time: 3 hours    Type of insulin:  Apidra    Backup Plan: Has tresiba  Aware that in case of malfunctioning of the pump or unexplained hyperglycemia to use basal and bolus therapy as backup which is prescribed to the patient  Also notified patient to call clinic and/or pump company if any issues or go to emergency department if signs/symptoms of DKA  CGM Interpretation  Nadia Washburn   Device used  Dexcom G6, Home use   Indication: Type 1 Diabetes   More than 72 hours of data was reviewed   Report to be scanned to chart  Date Range: 11/22/2022-12/5/2022  Analysis of data:   Average Glucose: 205   SD : 86%   Time in Target Range: 44%   Time Above Range: 25% high, 30% very high    Time Below Range: 1%   Interpretation of data:   Average glucose/time in target have improved since the last visit  Overall blood sugars are high especially after meals and overnight  She would likely benefit from bolus prior to meals despite history of gastroparesis  Has glucagon, refilled today     Last Eye Exam: UTD  Last Foot Exam: UTD    Has hypertension: Taking toprol  Has hyperlipidemia: Taking rosuvastatin    Thyroid disorders: hypothyroidsim- takes levothyroxine 100mcg mon-sat, 1/2 Sunday  (recenstly reduced)      She continues to take  prednisone 10mg daily and has not been able to get off this medication due to worsening sinus symptoms  This is prescribed through ENT, now takes in the morning, blood sugars better with that change       Patient Active Problem List   Diagnosis   • Left flank pain   • Diabetic polyneuropathy associated with type 1 diabetes mellitus (HCC)   • Dyslipidemia   • Hypothyroidism   • Insulin pump status   • 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 (HCC)   • Sarcoidosis   • Medical marijuana use   • Personal history of kidney stones   • Premature ventricular contractions   • Trigger finger of left thumb   • Pulmonary nodules   • Current chronic use of systemic steroids   • Vitamin D deficiency      Past Medical History:   Diagnosis Date   • Allergic rhinitis    • Anxiety    • Arthritis     osteo   • Asthma     borderline   • Chronic pain disorder     right knee, lower back   • Depression    • Diabetes mellitus (HCC)     Type 1 x 36yrs,,"on insulin pump for approx 17 yrs or so"   • Disease of thyroid gland     hypothyroid   • Dry eyes, bilateral    • Fatigue    • GERD (gastroesophageal reflux disease)     gastroparesis   • History of UTI    • Kidney stone several, hx of stent   • Motion sickness    • Nausea    • Neuropathy    • Neuropathy    • PONV (postoperative nausea and vomiting)     "pt reports scopalamine patch does help"   • Presence of surgical incision     "small biopsy site with steri and bandaid left outer thigh per pt report"   • Pulmonary nodule    • PVC (premature ventricular contraction)    • Sarcoidosis     "is currently being tested to see if has it"   • Status post right partial knee replacement     right   • Tooth missing    • Wears glasses       Past Surgical History:   Procedure Laterality Date   • APPENDECTOMY     • CARPAL TUNNEL RELEASE Bilateral    •  SECTION     • CYSTOSCOPY W/ LASER LITHOTRIPSY Left 2017    Procedure: CYSTOSCOPY, LEFT RETROGRADE PYELOGRAM LEFT URETEROSCOPY,  LEFT STENT INSERTION;  Surgeon: Donald Beard MD;  Location: [facility];  Service: Urology   • DILATION AND CURETTAGE OF UTERUS     • FINGER SURGERY     • HYSTERECTOMY     • JOINT REPLACEMENT Right     partial knee   • KNEE ARTHROSCOPY Right     x4   • KNEE SURGERY     • NM CYSTO/URETERO W/LITHOTRIPSY &INDWELL STENT INSRT Left 2017    Procedure: CYSTOSCOPY URETEROSCOPY,  RETROGRADE PYELOGRAM AND INSERTION STENT URETERAL;  Surgeon: Donald Beard MD;  Location: [facility];  Service: Urology   • REMOVAL URETERAL STENT  2017   • SKIN BIOPSY Left     outer thigh   • TRIGGER FINGER RELEASE      mul   • URETERAL STENT PLACEMENT     • VITRECTOMY Bilateral     right eye x2 and left x1   • WISDOM TOOTH EXTRACTION        Family History   Problem Relation Age of Onset   • Arthritis Mother    • Asthma Mother    • Heart disease Mother    • COPD Father    • Heart disease Father    • Hyperlipidemia Father    • Hypertension Father    • Diabetes Father    • Diabetes type II Father    • Hypothyroidism Father    • Diabetes type I Sister    • Breast cancer Sister    • Hashimoto's thyroiditis Daughter    • Diabetes type I Sister      Social History     Tobacco Use   • Smoking status: Former     Packs/day: 0 25     Years: 10 00     Pack years: 2 50     Types: Cigarettes   • Smokeless tobacco: Never   • Tobacco comments:     last had cigarette two months ago   Substance Use Topics   • Alcohol use: Yes     Comment: On occasion, not daily     Allergies   Allergen Reactions   • Insulin Aspart Abdominal Pain     Humulog and Novulog  Welts under skin and insulin resistance  Welts under skin and insulin resistance  Other reaction(s): Other  Welts under skin and insulin resistance   • Morphine Other (See Comments)     Other reaction(s):  Other  pain  Intensifies pain   • Iodinated Diagnostic Agents Hives     Pt can tolerate shellfish, pt can tolerate betadine   • Levofloxacin    • Oxycodone GI Intolerance     Pt is able to tolerate hydrocodone         Current Outpatient Medications:   •  ASPIRIN 81 PO, aspirin 81 mg tablet,delayed release, Disp: , Rfl:   •  budesonide-formoterol (SYMBICORT) 160-4 5 mcg/act inhaler, Inhale 2 puffs daily , Disp: , Rfl:   •  cholecalciferol (VITAMIN D3) 1,000 units tablet, Take 1 tablet (1,000 Units total) by mouth daily, Disp: , Rfl:   •  Continuous Blood Gluc Sensor (Dexcom G6 Sensor) MISC, CHANGE EVERY 10 DAYS, Disp: 9 each, Rfl: 0  •  Continuous Blood Gluc Transmit (Dexcom G6 Transmitter) MISC, Use daily as directed for CGM - Change every 3 months, Disp: 1 each, Rfl: 3  •  Glucagon (Baqsimi Two Pack) 3 MG/DOSE POWD, Use as needed for severe hypoglycemia Disp 1 two pack, Disp: 1 each, Rfl: 1  •  Insulin Disposable Pump (Omnipod 5 G6 Intro, Gen 5,) KIT, USE DAILY AS DIRECTED FOR  INSULIN THERAPY, Disp: 1 kit, Rfl: 0  •  Insulin Disposable Pump (Omnipod 5 G6 Pod, Gen 5,) MISC, 1 cartridge every 72 hours sc, Disp: 30 each, Rfl: 1  •  insulin glulisine (Apidra) 100 units/mL injection, USE WITH INSULIN PUMP 80-100UNITS PER DAY WITH PUMP, Disp: 90 mL, Rfl: 3  •  levothyroxine 100 mcg tablet, Take 100 mcg by mouth daily, Disp: , Rfl:   •  metoprolol succinate (TOPROL-XL) 25 mg 24 hr tablet, Take 25 mg by mouth every evening, Disp: , Rfl:   •  montelukast (SINGULAIR) 10 mg tablet, Take 10 mg by mouth daily at bedtime, Disp: , Rfl:   •  predniSONE 10 mg tablet, TAKE 2 TABLETS (20MG) BY MOUTH DAILY UNTIL DIRECTED OTHERWISE IN CLINIC, Disp: , Rfl:   •  rosuvastatin (CRESTOR) 20 MG tablet, TAKE 1 TABLET BY MOUTH EVERY DAY AT NIGHT, Disp: , Rfl:   •  SERTRALINE HCL PO, Take 200 mg by mouth daily, Disp: , Rfl:   •  Insulin Degludec 100 UNIT/ML SOLN, Use 30units subcut once daily in case of pump failure (Patient not taking: Reported on 12/5/2022), Disp: 10 mL, Rfl: 2  •  Insulin Syringe 27G X 1/2" 1 ML MISC, Use 3 (three) times a day Use in case of pump failure (Patient not taking: Reported on 12/5/2022), Disp: 100 each, Rfl: 1    Review of Systems   Constitutional: Negative for activity change, appetite change, chills, diaphoresis, fatigue, fever and unexpected weight change  HENT: Negative for trouble swallowing and voice change  Eyes: Negative for visual disturbance  Respiratory: Negative for shortness of breath  Cardiovascular: Negative for chest pain and palpitations  Gastrointestinal: Negative for abdominal pain, constipation and diarrhea  Endocrine: Negative for cold intolerance, heat intolerance, polydipsia, polyphagia and polyuria  Genitourinary: Negative for frequency and menstrual problem  Musculoskeletal: Negative for arthralgias and myalgias  Skin: Negative for rash  Allergic/Immunologic: Negative for food allergies  Neurological: Negative for dizziness and tremors  Hematological: Negative for adenopathy  Psychiatric/Behavioral: Negative for sleep disturbance  All other systems reviewed and are negative  Physical Exam:  Body mass index is 34 01 kg/m²    /62 (BP Location: Right arm, Patient Position: Sitting, Cuff Size: Large)   Pulse 77   Ht 5' 3" (1 6 m)   Wt 87 1 kg (192 lb)   SpO2 98%   BMI 34 01 kg/m²    Wt Readings from Last 3 Encounters:   12/05/22 87 1 kg (192 lb)   08/31/22 86 9 kg (191 lb 8 oz)   06/03/21 78 kg (172 lb)       Physical Exam  Vitals reviewed  Constitutional:       General: She is not in acute distress  Appearance: She is well-developed and well-nourished  HENT:      Head: Normocephalic and atraumatic  Eyes:      Conjunctiva/sclera: Conjunctivae normal       Pupils: Pupils are equal, round, and reactive to light  Neck:      Thyroid: No thyromegaly  Cardiovascular:      Rate and Rhythm: Normal rate and regular rhythm  Heart sounds: Normal heart sounds  Pulmonary:      Effort: Pulmonary effort is normal  No respiratory distress  Breath sounds: Normal breath sounds  No wheezing or rales  Abdominal:      General: Bowel sounds are normal  There is no distension  Palpations: Abdomen is soft  Tenderness: There is no abdominal tenderness  Musculoskeletal:         General: No edema  Normal range of motion  Cervical back: Normal range of motion and neck supple  Skin:     General: Skin is warm and dry  Neurological:      Mental Status: She is alert and oriented to person, place, and time     Psychiatric:         Mood and Affect: Mood and affect normal            Labs:   Lab Results   Component Value Date    HGBA1C 7 6 (A) 12/05/2022    HGBA1C 8 9 (A) 08/31/2022    HGBA1C 8 0 (H) 04/05/2022     Lab Results   Component Value Date    CREATININE 1 00 01/02/2018    CREATININE 0 90 12/10/2017    BUN 20 01/02/2018    K 3 9 01/02/2018    CL 93 (L) 01/02/2018    CO2 24 01/02/2018     eGFR   Date Value Ref Range Status   01/02/2018 70 ml/min/1 73sq m Final     No results found for: CHOL, HDL, LDL, TRIG, CHOLHDL  Lab Results   Component Value Date    ALT 40 01/02/2018    AST 75 (H) 01/02/2018    ALKPHOS 105 01/02/2018     No results found for: [id number]  No results found for: FREET4, TSI    Impression & Plan:    Problem List Items Addressed This Visit        Endocrine Hypothyroidism     Continue levothyroxine  Relevant Medications    levothyroxine 100 mcg tablet    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 (HCC) - Primary     Diabetes control has improved, but not at goal   She is currently not able to get her Dexcom Transmitter connected to her Omnipod 5 controller but it is working on her [facility] raghu on her phone  He transmitter is due to be changed in about 1 week which may be the problem  She was given a sample transmitter/sensor to change this early  If this does not improve the connectivity, she will let us know and we can contact omnipod rep for assistance  Also discussed proper site for pod/dexcom placement and that pod/dexcom should be a least 3 inches apart  She is currently bolusing after eating due to history of gastroparesis but is having post meal hyperglycemia and blood sugars often appear to rise very quickly  She is concerned that she may develop hypoglycemia with pre-meal bolus due to the gastroparesis  For now, suggested that she try to  take 1/2 of her carbohydrate bolus prior to eating and the other 1/2 of the bolus after eating when she starts to see Rise in CGM  Will review pump/sensor data in two weeks an make changes to carb ratio if needed at that time  Lab Results   Component Value Date    HGBA1C 7 6 (A) 12/05/2022            Relevant Medications    Glucagon (Baqsimi Two Pack) 3 MG/DOSE POWD    cholecalciferol (VITAMIN D3) 1,000 units tablet    Other Relevant Orders    POCT hemoglobin A1c (Completed)    Hemoglobin I1T    Basic metabolic panel       Other    Dyslipidemia     Continue rosuvastatin  Vitamin D deficiency     Continue supplement               Orders Placed This Encounter   Procedures   • Hemoglobin A1C     Standing Status:   Future     Standing Expiration Date:   12/5/2023   • Basic metabolic panel     This is a patient instruction: Patient fasting for 8 hours or longer recommended  Standing Status:   Future     Standing Expiration Date:   12/5/2023   • POCT hemoglobin A1c       Patient Instructions   Take OTC Vitamin D 1,000 units daily         Discussed with the patient and all questioned fully answered  She will call me if any problems arise  Follow-up appointment in 3 months       Counseled patient on diagnostic results, prognosis, risk and benefit of treatment options, instruction for management, importance of treatment compliance, Risk  factor reduction and impressions    Monique Vallecillo PA-C

## 2022-12-07 DIAGNOSIS — E10.3599 TYPE 1 DIABETES MELLITUS WITH PROLIFERATIVE RETINOPATHY WITHOUT MACULAR EDEMA, UNSPECIFIED LATERALITY (HCC): Primary | ICD-10-CM

## 2022-12-07 PROBLEM — E55.9 VITAMIN D DEFICIENCY: Status: ACTIVE | Noted: 2022-12-07

## 2022-12-07 RX ORDER — ADHESIVE REMOVER
PACKET (EA) MISCELLANEOUS
Qty: 100 EACH | Refills: 1 | Status: SHIPPED | OUTPATIENT
Start: 2022-12-07 | End: 2022-12-16 | Stop reason: SDUPTHER

## 2022-12-07 RX ORDER — ISOPROPYL ALCOHOL 700 MG/ML
CLOTH TOPICAL
Qty: 500 EACH | Refills: 1 | Status: SHIPPED | OUTPATIENT
Start: 2022-12-07

## 2022-12-07 RX ORDER — CHLORPHENIR/PHENYLEPH/ASPIRIN 2-7.8-325
TABLET, EFFERVESCENT ORAL
Qty: 100 STRIP | Refills: 1 | Status: SHIPPED | OUTPATIENT
Start: 2022-12-07 | End: 2022-12-16 | Stop reason: SDUPTHER

## 2022-12-07 RX ORDER — MELATONIN
1000 DAILY
Start: 2022-12-07

## 2022-12-07 RX ORDER — BLOOD-GLUCOSE CONTROL, NORMAL
EACH MISCELLANEOUS
Qty: 3 EACH | Refills: 1 | Status: SHIPPED | OUTPATIENT
Start: 2022-12-07

## 2022-12-07 NOTE — TELEPHONE ENCOUNTER
----- Message from Melodi Dancer sent at 12/7/2022 10:48 AM EST -----  Regarding: Scripts  Contact: [phone]  Hello,  I am in need of a few prescriptions for the following:  alcohol wipes  Adhesive remover  IV prep wipes  Chem strips for Cat contour next blood machine to have for back up  Ketone strips   Mail away is fine with 3 month supply  Thank you!   Asa Chappell

## 2022-12-07 NOTE — ASSESSMENT & PLAN NOTE
Diabetes control has improved, but not at goal   She is currently not able to get her Dexcom Transmitter connected to her Omnipod 5 controller but it is working on her Dexcom G6 raghu on her phone  He transmitter is due to be changed in about 1 week which may be the problem  She was given a sample transmitter/sensor to change this early  If this does not improve the connectivity, she will let us know and we can contact omnipod rep for assistance  Also discussed proper site for pod/dexcom placement and that pod/dexcom should be a least 3 inches apart  She is currently bolusing after eating due to history of gastroparesis but is having post meal hyperglycemia and blood sugars often appear to rise very quickly  She is concerned that she may develop hypoglycemia with pre-meal bolus due to the gastroparesis  For now, suggested that she try to  take 1/2 of her carbohydrate bolus prior to eating and the other 1/2 of the bolus after eating when she starts to see Rise in CGM  Will review pump/sensor data in two weeks an make changes to carb ratio if needed at that time     Lab Results   Component Value Date    HGBA1C 7 6 (A) 12/05/2022

## 2022-12-16 DIAGNOSIS — E10.3599 TYPE 1 DIABETES MELLITUS WITH PROLIFERATIVE RETINOPATHY WITHOUT MACULAR EDEMA, UNSPECIFIED LATERALITY (HCC): ICD-10-CM

## 2022-12-16 RX ORDER — ADHESIVE REMOVER
PACKET (EA) MISCELLANEOUS
Qty: 100 EACH | Refills: 1 | Status: SHIPPED | OUTPATIENT
Start: 2022-12-16

## 2022-12-16 RX ORDER — PERPHENAZINE 16 MG/1
TABLET, FILM COATED ORAL
Qty: 900 STRIP | Refills: 1 | Status: SHIPPED | OUTPATIENT
Start: 2022-12-16

## 2022-12-16 RX ORDER — PERPHENAZINE 16 MG/1
TABLET, FILM COATED ORAL
Qty: 900 STRIP | Refills: 1 | Status: SHIPPED | OUTPATIENT
Start: 2022-12-16 | End: 2022-12-16

## 2022-12-16 RX ORDER — CHLORPHENIR/PHENYLEPH/ASPIRIN 2-7.8-325
TABLET, EFFERVESCENT ORAL
Qty: 100 STRIP | Refills: 1 | Status: SHIPPED | OUTPATIENT
Start: 2022-12-16

## 2022-12-16 NOTE — TELEPHONE ENCOUNTER
Donel Collet  to [address] [place] [address] (supporting Julissa Rowe MD)      11:02 AM  Good morning,  Would you kindly send in scripts to my local pharmacy([facility]) for the following:  Contour Next test strips  Adhesive remover wipes  Ketone districts  Originally it was sent to mail away and was denied     Thank you,  Lynne Krishna

## 2023-01-25 ENCOUNTER — TELEPHONE (OUTPATIENT)
Dept: ENDOCRINOLOGY | Facility: CLINIC | Age: 48
End: 2023-01-25

## 2023-01-25 DIAGNOSIS — E10.3599 TYPE 1 DIABETES MELLITUS WITH PROLIFERATIVE RETINOPATHY WITHOUT MACULAR EDEMA, UNSPECIFIED LATERALITY (HCC): ICD-10-CM

## 2023-01-25 DIAGNOSIS — Z96.41 INSULIN PUMP STATUS: ICD-10-CM

## 2023-01-25 RX ORDER — INSULIN ASPART INJECTION 100 [IU]/ML
INJECTION, SOLUTION SUBCUTANEOUS
Refills: 0 | OUTPATIENT
Start: 2023-01-25

## 2023-01-25 NOTE — TELEPHONE ENCOUNTER
[facility] CoverMyMeds for Apidra 100 units/mL

## 2023-01-26 DIAGNOSIS — E10.3599 TYPE 1 DIABETES MELLITUS WITH PROLIFERATIVE RETINOPATHY WITHOUT MACULAR EDEMA, UNSPECIFIED LATERALITY (HCC): ICD-10-CM

## 2023-01-26 DIAGNOSIS — E10.3599 TYPE 1 DIABETES MELLITUS WITH PROLIFERATIVE RETINOPATHY WITHOUT MACULAR EDEMA, UNSPECIFIED LATERALITY (HCC): Primary | ICD-10-CM

## 2023-01-26 DIAGNOSIS — Z96.41 INSULIN PUMP STATUS: ICD-10-CM

## 2023-01-26 RX ORDER — BLOOD GLUCOSE CONTROL HIGH,LOW
EACH MISCELLANEOUS AS NEEDED
Qty: 1 EACH | Refills: 1 | Status: SHIPPED | OUTPATIENT
Start: 2023-01-26

## 2023-01-26 RX ORDER — BLOOD SUGAR DIAGNOSTIC
STRIP MISCELLANEOUS
Qty: 900 STRIP | Refills: 1 | Status: SHIPPED | OUTPATIENT
Start: 2023-01-26

## 2023-01-26 RX ORDER — LANCETS
EACH MISCELLANEOUS
Qty: 900 EACH | Refills: 1 | Status: SHIPPED | OUTPATIENT
Start: 2023-01-26

## 2023-01-26 RX ORDER — BLOOD-GLUCOSE METER
EACH MISCELLANEOUS
Qty: 1 KIT | Refills: 0 | Status: SHIPPED | OUTPATIENT
Start: 2023-01-26

## 2023-01-26 RX ORDER — INSULIN GLULISINE 100 [IU]/ML
INJECTION, SOLUTION SUBCUTANEOUS
Qty: 90 ML | Refills: 3 | OUTPATIENT
Start: 2023-01-26

## 2023-01-26 NOTE — TELEPHONE ENCOUNTER
Duplicate request - Pt is aware of cost  She asked that pharmacy put Rx on hold  Called and notified them

## 2023-01-26 NOTE — TELEPHONE ENCOUNTER
Pt called stating that Contour Next is no longer covered by her insurance  She asked that we send a new Rx for whatever is covered  Called [facility] to verify  They stated Accu chek products are preferred

## 2023-01-29 DIAGNOSIS — E10.69 TYPE 1 DIABETES MELLITUS WITH OTHER SPECIFIED COMPLICATION (HCC): ICD-10-CM

## 2023-01-30 RX ORDER — INSULIN PMP CART,AUT,G6/7,CNTR
EACH SUBCUTANEOUS
Qty: 30 EACH | Refills: 1 | Status: SHIPPED | OUTPATIENT
Start: 2023-01-30

## 2023-02-11 DIAGNOSIS — E10.3599 TYPE 1 DIABETES MELLITUS WITH PROLIFERATIVE RETINOPATHY WITHOUT MACULAR EDEMA, UNSPECIFIED LATERALITY (HCC): ICD-10-CM

## 2023-02-13 RX ORDER — BLOOD-GLUCOSE SENSOR
EACH MISCELLANEOUS
Qty: 9 EACH | Refills: 0 | Status: SHIPPED | OUTPATIENT
Start: 2023-02-13

## 2023-05-25 ENCOUNTER — TELEPHONE (OUTPATIENT)
Dept: ENDOCRINOLOGY | Facility: CLINIC | Age: 48
End: 2023-05-25

## 2023-05-25 NOTE — TELEPHONE ENCOUNTER
Pt SCOOTEROM, she is in the ED and is getting a CT scan, needs to have sensor removed, next box not coming for a few days  She is requesting 1 sensor be called in to local pharmacy  Pt is overdue for follow up and no showed last 2 appts  Please call to schedule appt

## 2023-05-26 DIAGNOSIS — E10.3599 TYPE 1 DIABETES MELLITUS WITH PROLIFERATIVE RETINOPATHY WITHOUT MACULAR EDEMA, UNSPECIFIED LATERALITY (HCC): ICD-10-CM

## 2023-05-26 RX ORDER — PROCHLORPERAZINE 25 MG/1
SUPPOSITORY RECTAL
Qty: 1 EACH | Refills: 0 | Status: SHIPPED | OUTPATIENT
Start: 2023-05-26

## 2023-05-26 RX ORDER — PROCHLORPERAZINE 25 MG/1
SUPPOSITORY RECTAL
Qty: 9 EACH | Refills: 0 | Status: SHIPPED | OUTPATIENT
Start: 2023-05-26 | End: 2023-05-26 | Stop reason: SDUPTHER

## 2023-05-26 NOTE — TELEPHONE ENCOUNTER
Pt called today stating she would like one Dexcom sensor sent to her local [facility] on [address] to hold her over until her mail order comes in in June  Any questions, please contact pt   Thanx

## 2023-05-31 ENCOUNTER — OFFICE VISIT (OUTPATIENT)
Dept: ENDOCRINOLOGY | Facility: CLINIC | Age: 48
End: 2023-05-31

## 2023-05-31 VITALS
SYSTOLIC BLOOD PRESSURE: 102 MMHG | WEIGHT: 196 LBS | BODY MASS INDEX: 34.73 KG/M2 | HEART RATE: 82 BPM | HEIGHT: 63 IN | DIASTOLIC BLOOD PRESSURE: 70 MMHG

## 2023-05-31 DIAGNOSIS — Z96.41 INSULIN PUMP STATUS: ICD-10-CM

## 2023-05-31 DIAGNOSIS — E10.69 TYPE 1 DIABETES MELLITUS WITH OTHER SPECIFIED COMPLICATION (HCC): Primary | ICD-10-CM

## 2023-05-31 DIAGNOSIS — E03.9 ACQUIRED HYPOTHYROIDISM: ICD-10-CM

## 2023-05-31 DIAGNOSIS — E10.3599 TYPE 1 DIABETES MELLITUS WITH PROLIFERATIVE RETINOPATHY WITHOUT MACULAR EDEMA, UNSPECIFIED LATERALITY (HCC): ICD-10-CM

## 2023-05-31 LAB — SL AMB POCT HEMOGLOBIN AIC: 7.5 (ref ?–6.5)

## 2023-05-31 RX ORDER — LEVOTHYROXINE SODIUM 0.1 MG/1
100 TABLET ORAL DAILY
Qty: 90 TABLET | Refills: 2 | Status: SHIPPED | OUTPATIENT
Start: 2023-05-31

## 2023-05-31 RX ORDER — INSULIN GLULISINE 100 [IU]/ML
INJECTION, SOLUTION SUBCUTANEOUS
Qty: 90 ML | Refills: 3 | Status: SHIPPED | OUTPATIENT
Start: 2023-05-31

## 2023-05-31 NOTE — PROGRESS NOTES
"    Established Patient Progress Note      Chief Complaint   Patient presents with   • Diabetes Type 1        History of Present Illness: Alexander Robins is a 53 y o  female with a history of type 1 diabetes with long term use of insulin since age 12  Last seen by Tanisha Tao in Dec 2027    Reports complications of retinopathy, neuropathy, and gastroparesis  Denies recent illness or hospitalizations, but did have an ED visit for bright blood per recturm  Denies recent severe hypoglycemic or severe hyperglycemic episodes  Denies any issues with her current regimen  home glucose monitoring: are performed regularly using Dexcom G6    Patient is on a Omnipod 5 pump prescribed by our office  She has been on a pump for many years but started this pump a few months ago  Previously used [employer]  She denies any malfunctioning of the pump  Current location is right thigh with CGM on abdomen  Due to gastroparesis, she boluses after her meals after seeing glucose start to rise  After the last visit, sghe has been trying to bolus prior to meals instead of after meals  She has been \"trying\" to do this especially with dinner  Current Insulin pump settings:  Basal rate:12AM 1 4, 2AM 1 1, 7A 01 0, 9AM 1 15, 12PM 1 25, 4:30PM 1 1, 7PM 1 0  Insulin to carb ratio:12AM 7 5, 12p 6 5  430p 7  Insulin sensitivity factor:12AM 40, 3AM 30 12PM 40 5pm 25  BG target:110  Active Insulin time: 3 hours  Type of insulin:  Apidra  Daily dose 66 6units; basal 38 units      Backup Plan: Has tresiba  Aware that in case of malfunctioning of the pump or unexplained hyperglycemia to use basal and bolus therapy as backup which is prescribed to the patient  Also notified patient to call clinic and/or pump company if any issues or go to emergency department if signs/symptoms of DKA  CGM Interpretation  Alexander Robins   Device used  Dexcom G6, Home use   Indication: Type 1 Diabetes   More than 72 hours of data was reviewed   Report to " "be scanned to chart  Date Range:  5/18/23-5/31/23  Analysis of data:   Average Glucose: 182  SD : 79%   CoV: 43 3%  Time in Target Range: 55%   Time Above Range: 21% high, 22% very high    Time Below Range: 2%   Interpretation of data:     Has coffee and steroids in the morning  Sometimes will has an english muffin  Will giver herself 30-45g of carbs for food but not any for steroids  Also still has rises after dinner  Has glucagon, refilled today     Ophthal: 5/2022 and then seeing retina in July   Pod: due; has asymmetry in foot exam to see Neuro  Has hypertension: Taking metoprolol  Has hyperlipidemia: Taking rosuvastatin    Thyroid disorders: hypothyroidsim- takes levothyroxine 100mcg mon-sat, 1/2 Sunday  (recently reduced)      She continues to take  prednisone 9mg daily and has not been able to get off this medication due to worsening sinus symptoms  This is prescribed through ENT, now takes in the morning       Patient Active Problem List   Diagnosis   • Left flank pain   • Diabetic polyneuropathy associated with type 1 diabetes mellitus (HCC)   • Dyslipidemia   • Hypothyroidism   • Insulin pump status   • 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 (HCC)   • Sarcoidosis   • Medical marijuana use   • Personal history of kidney stones   • Premature ventricular contractions   • Trigger finger of left thumb   • Pulmonary nodules   • Current chronic use of systemic steroids   • Vitamin D deficiency      Past Medical History:   Diagnosis Date   • Allergic rhinitis    • Anxiety    • Arthritis     osteo   • Asthma     borderline   • Chronic pain disorder     right knee, lower back   • Depression    • Diabetes mellitus (HCC)     Type 1 x 36yrs,,\"on insulin pump for approx 17 yrs or so\"   • Disease of thyroid gland     hypothyroid   • Dry eyes, bilateral    • Fatigue    • GERD (gastroesophageal reflux disease)     gastroparesis   • History of UTI    • Kidney stone     " "several, hx of stent   • Motion sickness    • Nausea    • Neuropathy    • Neuropathy    • PONV (postoperative nausea and vomiting)     \"pt reports scopalamine patch does help\"   • Presence of surgical incision     \"small biopsy site with steri and bandaid left outer thigh per pt report\"   • Pulmonary nodule    • PVC (premature ventricular contraction)    • Sarcoidosis     \"is currently being tested to see if has it\"   • Status post right partial knee replacement     right   • Tooth missing    • Wears glasses       Past Surgical History:   Procedure Laterality Date   • APPENDECTOMY     • CARPAL TUNNEL RELEASE Bilateral    •  SECTION     • CYSTOSCOPY W/ LASER LITHOTRIPSY Left 2017    Procedure: CYSTOSCOPY, LEFT RETROGRADE PYELOGRAM LEFT URETEROSCOPY,  LEFT STENT INSERTION;  Surgeon: Keaton Kulkarni MD;  Location: [facility];  Service: Urology   • DILATION AND CURETTAGE OF UTERUS     • FINGER SURGERY     • HYSTERECTOMY     • JOINT REPLACEMENT Right     partial knee   • KNEE ARTHROSCOPY Right     x4   • KNEE SURGERY     • ND CYSTO/URETERO W/LITHOTRIPSY &INDWELL STENT INSRT Left 2017    Procedure: CYSTOSCOPY URETEROSCOPY,  RETROGRADE PYELOGRAM AND INSERTION STENT URETERAL;  Surgeon: Keaton Kulkarni MD;  Location: [facility];  Service: Urology   • REMOVAL URETERAL STENT  2017   • SKIN BIOPSY Left     outer thigh   • TRIGGER FINGER RELEASE      mul   • URETERAL STENT PLACEMENT     • VITRECTOMY Bilateral     right eye x2 and left x1   • WISDOM TOOTH EXTRACTION        Family History   Problem Relation Age of Onset   • Arthritis Mother    • Asthma Mother    • Heart disease Mother    • COPD Father    • Heart disease Father    • Hyperlipidemia Father    • Hypertension Father    • Diabetes Father    • Diabetes type II Father    • Hypothyroidism Father    • Diabetes type I Sister    • Breast cancer Sister    • Hashimoto's thyroiditis Daughter    • Diabetes type I Sister      Social History     Tobacco " Use   • Smoking status: Former     Packs/day: 0 25     Years: 10 00     Total pack years: 2 50     Types: Cigarettes   • Smokeless tobacco: Never   • Tobacco comments:     last had cigarette two months ago   Substance Use Topics   • Alcohol use: Yes     Comment: On occasion, not daily     Allergies   Allergen Reactions   • Insulin Aspart Abdominal Pain     Humulog and Novulog  Welts under skin and insulin resistance  Welts under skin and insulin resistance  Other reaction(s): Other  Welts under skin and insulin resistance   • Morphine Other (See Comments)     Other reaction(s): Other  pain  Intensifies pain   • Iodinated Contrast Media Hives     Pt can tolerate shellfish, pt can tolerate betadine  Pt pt she is not allergic to IV contrast   • Levofloxacin    • Oxycodone GI Intolerance     Pt is able to tolerate hydrocodone         Current Outpatient Medications:   •  Accu-Chek FastClix Lancets MISC, Test BG up to 10x daily as directed, Disp: 900 each, Rfl: 1  •  Acetone, Urine, Test (Ketone Test) STRP, Use as needed to test urine if BG >300 or sick, Disp: 100 strip, Rfl: 1  •  Antiseptic Products, Misc   (IV Prep Wipes) 70 % PADS, Use 1 daily as directed, Disp: 100 each, Rfl: 1  •  Blood Glucose Calibration (Accu-Chek Guide Control) LIQD, Use as needed (to calibrate), Disp: 1 each, Rfl: 1  •  Blood Glucose Monitoring Suppl (Accu-Chek Guide Me) w/Device KIT, Test BG up to 10x daily as directed, Disp: 1 kit, Rfl: 0  •  budesonide-formoterol (SYMBICORT) 160-4 5 mcg/act inhaler, Inhale 2 puffs daily , Disp: , Rfl:   •  cholecalciferol (VITAMIN D3) 1,000 units tablet, Take 1 tablet (1,000 Units total) by mouth daily, Disp: , Rfl:   •  Continuous Blood Gluc Sensor (Dexcom G6 Sensor) MISC, Use daily as directed for CGM - Change every 10 days, Disp: 1 each, Rfl: 0  •  Continuous Blood Gluc Transmit (Dexcom G6 Transmitter) MISC, Use daily as directed for CGM - Change every 3 months, Disp: 1 each, Rfl: 3  •  Glucagon (Baqsimi "Two Pack) 3 MG/DOSE POWD, Use as needed for severe hypoglycemia Disp 1 two pack, Disp: 1 each, Rfl: 1  •  glucose blood (Accu-Chek Guide) test strip, Test BG up to 10x daily as directed, Disp: 900 strip, Rfl: 1  •  Insulin Degludec 100 UNIT/ML SOLN, Use 30units subcut once daily in case of pump failure, Disp: 10 mL, Rfl: 2  •  Insulin Disposable Pump (Omnipod 5 G6 Intro, Gen 5,) KIT, USE DAILY AS DIRECTED FOR  INSULIN THERAPY, Disp: 1 kit, Rfl: 0  •  Insulin Disposable Pump (Omnipod 5 G6 Pod, Gen 5,) MISC, USE 1 POD EVERY 72 HOURS   SUBCUTANEOUSLY, Disp: 30 each, Rfl: 1  •  insulin glulisine (Apidra) 100 units/mL injection, USE WITH INSULIN PUMP 80-100UNITS PER DAY WITH PUMP, Disp: 90 mL, Rfl: 3  •  Insulin Syringe 27G X 1/2\" 1 ML MISC, Use 3 (three) times a day Use in case of pump failure, Disp: 100 each, Rfl: 1  •  Isopropyl Alcohol (Alcohol Wipes) 70 % MISC, Use up to 5 daily as directed, Disp: 500 each, Rfl: 1  •  levothyroxine 100 mcg tablet, Take 1 tablet (100 mcg total) by mouth daily Take one pill by mouth Mon- Sat and 1/2 tab on Sunday, Disp: 90 tablet, Rfl: 2  •  metoprolol succinate (TOPROL-XL) 25 mg 24 hr tablet, Take 25 mg by mouth every evening, Disp: , Rfl:   •  montelukast (SINGULAIR) 10 mg tablet, Take 10 mg by mouth daily at bedtime, Disp: , Rfl:   •  Ostomy Supplies (Adhesive Remover Wipes) MISC, Use daily as directed, Disp: 100 each, Rfl: 1  •  predniSONE 10 mg tablet, 9 mg daily, Disp: , Rfl:   •  rosuvastatin (CRESTOR) 20 MG tablet, TAKE 1 TABLET BY MOUTH EVERY DAY AT NIGHT, Disp: , Rfl:   •  SERTRALINE HCL PO, Take 150 mg by mouth daily, Disp: , Rfl:   •  ASPIRIN 81 PO, aspirin 81 mg tablet,delayed release (Patient not taking: Reported on 5/31/2023), Disp: , Rfl:     Review of Systems   Constitutional: Negative for fatigue and unexpected weight change  HENT: Negative for trouble swallowing and voice change  Eyes: Positive for visual disturbance     Respiratory: Negative for shortness of " "breath  Cardiovascular: Negative for chest pain and palpitations  Gastrointestinal: Negative for constipation and diarrhea  Endocrine: Negative for polydipsia and polyuria  Genitourinary: Negative for frequency and menstrual problem  Musculoskeletal: Positive for back pain  Skin: Negative for rash  Neurological: Positive for numbness (left foot more then right)  Physical Exam:  Body mass index is 34 72 kg/m²  /70   Pulse 82   Ht 5' 3\" (1 6 m)   Wt 88 9 kg (196 lb)   BMI 34 72 kg/m²    Wt Readings from Last 3 Encounters:   05/31/23 88 9 kg (196 lb)   12/05/22 87 1 kg (192 lb)   08/31/22 86 9 kg (191 lb 8 oz)         Physical Exam   Gen: appears well-developed and well-nourished  No apparent distress  Head: Normocephalic and atraumatic  Eyes: no stare or proptosis, no periorbital edema  E/N/M nl facies, hearing grossly intact  Neck: range of motion  Pulmonary/Chest: breathing  comfortably, no accessory muscle use, effort normal    Musculoskeletal: moves all 4 extremities, gait nl  Neurological: alert and oriented to person, place, and time  No upper ext tremor appreciated  Skin: does not appear diaphoretic, no facial plethora  Psychiatric: normal mood and affect; behavior is normal; no gross lapses in memory, answer questions appropriately    Patient's shoes and socks removed  Right Foot/Ankle   Right Foot Inspection  Skin Exam: skin normal and skin intact  No dry skin, no warmth, no callus, no erythema, no maceration, no abnormal color, no pre-ulcer, no ulcer and no callus  Toe Exam: ROM and strength within normal limits  Sensory   Vibration: intact  Monofilament testing: intact    Vascular  Capillary refills: < 3 seconds  The right DP pulse is 1+  Left Foot/Ankle  Left Foot Inspection  Skin Exam: skin normal, skin intact and callus (great toe)  No dry skin, no warmth, no erythema, no maceration, normal color, no pre-ulcer and no ulcer       Toe Exam: ROM and " "strength within normal limits  Sensory   Vibration: absent  Monofilament testing: intact    Vascular  Capillary refills: < 3 seconds  The left DP pulse is 1+  Assign Risk Category  No deformity present  Loss of protective sensation  No weak pulses  Risk: 1        Labs:   No results found for: \"TSH\", \"[id number]\"    Lab Results   Component Value Date    HGBA1C 7 5 (A) 05/31/2023    HGBA1C 7 6 (A) 12/05/2022    HGBA1C 8 9 (A) 08/31/2022     Lab Results   Component Value Date    BUN 20 01/02/2018    CL 93 (L) 01/02/2018    CO2 24 01/02/2018    CREATININE 1 00 01/02/2018    CREATININE 0 90 12/10/2017    K 3 9 01/02/2018     eGFR   Date Value Ref Range Status   01/02/2018 70 ml/min/1 73sq m Final       Lab Results   Component Value Date    ALKPHOS 105 01/02/2018    ALT 40 01/02/2018    AST 75 (H) 01/02/2018           Impression & Plan:    1  Type 1 diabetes, multiple complications: I5Q is stable and hypoglycemia is infrequent  She is using her pump appropriately  There is a rise at 12p which may be related to prednisone as she is not eating  Suggested adding 15g of \"carbs\" to her morning/coffee bolus to account for the prednisone  Reinforced trying to bolus prior to dinner as this is still a consistent area of hyperglycemia  Labs ordered for next visit in os including A1c    2  Hypothyroidism: Appears euthyroid on levothyroxine 100mcg once daily Mon-Sat and 1/2 tab Sunday   Due for A1c with next labs in os    Problem List Items Addressed This Visit        Endocrine    Hypothyroidism    Relevant Medications    levothyroxine 100 mcg tablet    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 (HCC)    Relevant Medications    insulin glulisine (Apidra) 100 units/mL injection       Other    Insulin pump status    Relevant Medications    insulin glulisine (Apidra) 100 units/mL injection   Other Visit Diagnoses     Type 1 diabetes mellitus with other specified complication ([facility] [address] )    -  " Primary    Relevant Medications    insulin glulisine (Apidra) 100 units/mL injection    Other Relevant Orders    POCT hemoglobin A1c (Completed)    Ambulatory referral to Ophthalmology    Hemoglobin A1C    TSH, 3rd generation Lab Collect    Albumin / creatinine urine ratio          Orders Placed This Encounter   Procedures   • Hemoglobin A1C     Standing Status:   Future     Standing Expiration Date:   5/31/2024   • TSH, 3rd generation Lab Collect     This is a patient instruction: This test is non-fasting  Please drink two glasses of water morning of bloodwork  Standing Status:   Future     Standing Expiration Date:   5/31/2024   • Albumin / creatinine urine ratio     Standing Status:   Future     Standing Expiration Date:   5/31/2024   • Ambulatory referral to Ophthalmology     Standing Status:   Future     Standing Expiration Date:   5/31/2024     Referral Priority:   Routine     Referral Type:   Consult - AMB     Referral Reason:   Specialty Services Required     Referred to Provider:   Harshal Wheeler MD     Requested Specialty:   Ophthalmology     Number of Visits Requested:   1     Expiration Date:   5/31/2024   • POCT hemoglobin A1c       Patient Instructions   Add 15g of carbs for the prednisone          Discussed with the patient and all questioned fully answered  She will call me if any problems arise  Follow-up appointment in 3 months       Counseled patient on diagnostic results, prognosis, risk and benefit of treatment options, instruction for management, importance of treatment compliance, Risk  factor reduction and impressions    Eduard Bartlett MD

## 2023-06-05 DIAGNOSIS — E10.3599 TYPE 1 DIABETES MELLITUS WITH PROLIFERATIVE RETINOPATHY WITHOUT MACULAR EDEMA, UNSPECIFIED LATERALITY (HCC): ICD-10-CM

## 2023-06-05 RX ORDER — PROCHLORPERAZINE 25 MG/1
SUPPOSITORY RECTAL
Qty: 9 EACH | Refills: 0 | Status: SHIPPED | OUTPATIENT
Start: 2023-06-05

## 2023-07-17 ENCOUNTER — TELEPHONE (OUTPATIENT)
Dept: NEUROLOGY | Facility: CLINIC | Age: 48
End: 2023-07-17

## 2023-07-17 NOTE — TELEPHONE ENCOUNTER
Patient called back for a sooner appt  and I offered her 7-24-23 at 230 pm with Dr. Sunil Redding in [facility] and she accepted.

## 2023-07-17 NOTE — TELEPHONE ENCOUNTER
1ST ATTEMPT - Called patient and LVM to call back to schedule sooner New patient appt w/ Dr. Janee Segovia in either [facility] or [facility]. (Called from waitlist).

## 2023-07-24 ENCOUNTER — OFFICE VISIT (OUTPATIENT)
Dept: NEUROLOGY | Facility: CLINIC | Age: 48
End: 2023-07-24
Payer: COMMERCIAL

## 2023-07-24 VITALS
DIASTOLIC BLOOD PRESSURE: 60 MMHG | WEIGHT: 201.4 LBS | RESPIRATION RATE: 18 BRPM | HEIGHT: 63 IN | BODY MASS INDEX: 35.68 KG/M2 | HEART RATE: 86 BPM | OXYGEN SATURATION: 96 % | SYSTOLIC BLOOD PRESSURE: 100 MMHG

## 2023-07-24 DIAGNOSIS — R29.898 WEAKNESS OF BOTH HANDS: ICD-10-CM

## 2023-07-24 DIAGNOSIS — M54.10 RADICULAR PAIN OF BOTH LOWER EXTREMITIES: ICD-10-CM

## 2023-07-24 DIAGNOSIS — R29.2 BRISK DEEP TENDON REFLEXES: Primary | ICD-10-CM

## 2023-07-24 PROCEDURE — 99204 OFFICE O/P NEW MOD 45 MIN: CPT | Performed by: PSYCHIATRY & NEUROLOGY

## 2023-07-24 NOTE — ASSESSMENT & PLAN NOTE
She has radicular pain in both legs. She also has allodynia of the thighs and numbness. Diabetic amyotrophy versus lumbosacral radiculopathy are in the differential.    We will check EMG bilateral lower extremities. She will try alpha lipoic acid 300 g twice daily. She has not tolerated gabapentin in the past due to fatigue. She was concerned about the side effect profile of Lyrica. She is already on an SSRI, therefore, I did not add a TCA.

## 2023-07-24 NOTE — ASSESSMENT & PLAN NOTE
Lab Results   Component Value Date    HGBA1C 7.5 (A) 05/31/2023   She was dx with DM PN many years ago. She did not tolerate gabapentin due to excessive sleepiness. She is reluctant to try Lyrica because her mother had adverse effects on it. She is already on an SSRI so we did not try TCA. She will start alpha lipoic acid 300 mg BID.      We will check EMG BLE to evaluate degree of neuropathy

## 2023-07-24 NOTE — PROGRESS NOTES
Review of Systems   Constitutional: Positive for fatigue (all the time). Negative for appetite change and fever. HENT: Positive for trouble swallowing (on occassion). Negative for hearing loss, tinnitus and voice change. Eyes: Positive for visual disturbance (blurry vision). Negative for photophobia and pain. Respiratory: Negative. Negative for shortness of breath. Cardiovascular: Negative. Negative for palpitations. Gastrointestinal: Negative. Negative for nausea and vomiting. Endocrine: Negative. Negative for cold intolerance. Genitourinary: Negative. Negative for dysuria, frequency and urgency. Musculoskeletal: Positive for back pain (lower back pain - when standing for too long she feels throbbing on her lower back), gait problem (balance ) and myalgias (hands - drops a lot of objects). Negative for neck pain. Leg pain - at worst 10/10   Skin: Negative. Negative for rash. Allergic/Immunologic: Negative. Neurological: Positive for dizziness, weakness (hands), numbness (thighs - constant) and headaches. Negative for tremors, seizures, syncope, facial asymmetry, speech difficulty and light-headedness. Hematological: Negative. Does not bruise/bleed easily. Psychiatric/Behavioral: Positive for confusion (brain fog). Negative for hallucinations and sleep disturbance.

## 2023-07-24 NOTE — PROGRESS NOTES
Patient ID: Ney Leo is a 53 y.o. female. Assessment/Plan:    Radicular pain of both lower extremities  She has radicular pain in both legs. She also has allodynia of the thighs and numbness. Diabetic amyotrophy versus lumbosacral radiculopathy are in the differential.    We will check EMG bilateral lower extremities    Brisk deep tendon reflexes  She has very brisk reflexes in the arms with Elkins + on the left. She has hand weakness and incomplete bladder emptying. All are concenrning for spinal cord impingement. MRI C spine wo contrast ordered. Diabetic polyneuropathy associated with type 1 diabetes mellitus ([address])    Lab Results   Component Value Date    HGBA1C 7.5 (A) 05/31/2023   She was dx with DM PN many years ago. She did not tolerate gabapentin due to excessive sleepiness. She is reluctant to try Lyrica because her mother had adverse effects on it. She is already on an SSRI so we did not try TCA. She will start alpha lipoic acid 300 mg BID. We will check EMG BLE to evaluate degree of neuropathy         Problem List Items Addressed This Visit        Other    Radicular pain of both lower extremities     She has radicular pain in both legs. She also has allodynia of the thighs and numbness. Diabetic amyotrophy versus lumbosacral radiculopathy are in the differential.    We will check EMG bilateral lower extremities         Relevant Orders    EMG 2 limb lower extremity    Brisk deep tendon reflexes - Primary     She has very brisk reflexes in the arms with Elkins + on the left. She has hand weakness and incomplete bladder emptying. All are concenrning for spinal cord impingement. MRI C spine wo contrast ordered.           Relevant Orders    MRI cervical spine wo contrast    Weakness of both hands    Relevant Orders    MRI cervical spine wo contrast          Subjective:    53 tr old female with diabetes type I, sarcoid and diabetic neuropathy presents with asymmetric neuropathic pain.    Several years ago, she as dx with diabetic neuropathy. She had an EMG done at outside location in her early 35s and tells me it was diagnostic for neuropathy. She recently saw her endocrinologist who noticed that she had reduced vibration sense in the L great toe. She has constant low back paain. Worse on standing for too long. Thighs are constantly in pain. She has allodynia in the thighs. She has numbness superimposed on these symtpoms. On walking she has burning, stabbing pain in her LLE. Hand strength is worse. She drops things. In 2019 or 2020, while she washing a cast iron grate, she dropped it on her toe and fractured the toe. She has to  things tighter than normal. She is not aware how tightly she is holding things. She has constant fatigue and brain fog. She has DM1 since 1980. She is on chronic insulin. She tried gabapentin but it made her too tired. She was taking it only at night. She is worried to take Lyrica due to the adverse effects. She is on Zoloft for depression and anxiety. She has medical marijuana card. It has not helped with the neuropathy. Walking will worsen the symptoms. Sometimes sitting will help. Sometimes laying in bed worsens the pain, especially the LLE. She has sarcoid. She was having sinus issues, anosmia and trouble with taste. ENT was initially suspicious of sarcoid. She subsequently saw dermatology andshe had skin biopsies of thighs which she tells me were ultimately diagnostic of sarcoidosis. Her mother has polymiositis and spinal stenosis. Objective:    Blood pressure 100/60, pulse 86, resp. rate 18, height 5' 3" (1.6 m), weight 91.4 kg (201 lb 6.4 oz), SpO2 96 %, not currently breastfeeding. Physical Exam  Constitutional:       Appearance: Normal appearance. Eyes:      Conjunctiva/sclera: Conjunctivae normal.   Neck:      Vascular: No carotid bruit.    Cardiovascular:      Rate and Rhythm: Normal rate and regular rhythm. Pulmonary:      Breath sounds: Wheezing present. Comments: End expiratory wheeze on the left side. Neurological Exam  Mental status: Awake, alert, oriented to person place and time. Naming, recall, knowledge, repetition, concentration and naming intact. Cranial nerves: Extraocular movements intact. Pupils equally round and reactive to light. Visual fields full to confrontation. Facial sensation intact. Face symmetric. Hearing intact. Tongue, uvula, palate midline and intact. Sternocleidomastoid intact. Motor: Strength 5-/5 in the upper extremities and 5/5 in the lower extremities. Normal tone. Cerebellar: No dysmetria. Heel-to-shin intact. Gait slow and cautious. Reflexes: 3+ in the arms, 2-3+ at the knees and 1+ at the ankles. Elkins + on the L. Plantar responses equivocal on the left and flexor on the right    Sensory: Temperature reduce in the lateral foot and medial leg on the R and the medial foot on the left side. Vibration sensation intact. Romberg negative. ROS:    Review of Systems   Constitutional: Positive for fatigue. Eyes: Positive for visual disturbance. Respiratory: Positive for shortness of breath. Cardiovascular: Negative for chest pain. Gastrointestinal: Negative for nausea and vomiting. Endocrine: Negative for cold intolerance. Musculoskeletal: Positive for back pain, myalgias and neck stiffness. Skin: Negative for rash. Neurological: Positive for weakness and numbness. Psychiatric/Behavioral: Positive for confusion. Negative for hallucinations and sleep disturbance. Data:  MRI thoracic and lumbar spine without contrast 12/24/2019:  Unremarkable MRI of the thoracic lumbar spine. There is no nerve impingement,   central canal stenosis or neural foraminal narrowing.

## 2023-07-24 NOTE — ASSESSMENT & PLAN NOTE
She has very brisk reflexes in the arms with Elkins + on the left. She has hand weakness and incomplete bladder emptying. All are concenrning for spinal cord impingement. MRI C spine wo contrast ordered.

## 2023-07-25 DIAGNOSIS — E10.69 TYPE 1 DIABETES MELLITUS WITH OTHER SPECIFIED COMPLICATION (HCC): ICD-10-CM

## 2023-07-25 RX ORDER — INSULIN PMP CART,AUT,G6/7,CNTR
EACH SUBCUTANEOUS
Qty: 10 EACH | Refills: 0 | Status: SHIPPED | OUTPATIENT
Start: 2023-07-25

## 2023-07-25 RX ORDER — INSULIN PMP CART,AUT,G6/7,CNTR
EACH SUBCUTANEOUS
Qty: 30 EACH | Refills: 1 | Status: SHIPPED | OUTPATIENT
Start: 2023-07-25

## 2023-07-25 NOTE — TELEPHONE ENCOUNTER
Pt is requesting a refill for her Ominpod  pod, 30 days supply to her local pharmacy and 90 days supply to her mail order.

## 2023-08-10 LAB
LEFT EYE DIABETIC RETINOPATHY: POSITIVE
RIGHT EYE DIABETIC RETINOPATHY: POSITIVE

## 2023-08-21 DIAGNOSIS — E10.69 TYPE 1 DIABETES MELLITUS WITH OTHER SPECIFIED COMPLICATION (HCC): ICD-10-CM

## 2023-08-21 RX ORDER — INSULIN PMP CART,AUT,G6/7,CNTR
EACH SUBCUTANEOUS
Qty: 10 EACH | Refills: 0 | Status: SHIPPED | OUTPATIENT
Start: 2023-08-21

## 2023-08-30 DIAGNOSIS — E10.3599 TYPE 1 DIABETES MELLITUS WITH PROLIFERATIVE RETINOPATHY WITHOUT MACULAR EDEMA, UNSPECIFIED LATERALITY (HCC): ICD-10-CM

## 2023-08-30 RX ORDER — PROCHLORPERAZINE 25 MG/1
SUPPOSITORY RECTAL
Qty: 9 EACH | Refills: 0 | Status: SHIPPED | OUTPATIENT
Start: 2023-08-30

## 2023-08-30 RX ORDER — PROCHLORPERAZINE 25 MG/1
SUPPOSITORY RECTAL
Qty: 1 EACH | Refills: 1 | Status: SHIPPED | OUTPATIENT
Start: 2023-08-30

## 2023-09-08 ENCOUNTER — HOSPITAL ENCOUNTER (OUTPATIENT)
Facility: MEDICAL CENTER | Age: 48
Discharge: HOME | End: 2023-09-08
Payer: COMMERCIAL

## 2023-09-08 DIAGNOSIS — R29.2 BRISK DEEP TENDON REFLEXES: ICD-10-CM

## 2023-09-08 DIAGNOSIS — R29.898 WEAKNESS OF BOTH HANDS: ICD-10-CM

## 2023-09-08 PROCEDURE — G1004 CDSM NDSC: HCPCS

## 2023-09-08 PROCEDURE — 72141 MRI NECK SPINE W/O DYE: CPT

## 2023-09-11 ENCOUNTER — HOSPITAL ENCOUNTER (OUTPATIENT)
Dept: NEUROLOGY | Facility: CLINIC | Age: 48
Discharge: HOME | End: 2023-09-11
Payer: COMMERCIAL

## 2023-09-11 DIAGNOSIS — M54.10 RADICULAR PAIN OF BOTH LOWER EXTREMITIES: ICD-10-CM

## 2023-09-11 PROCEDURE — 95912 NRV CNDJ TEST 11-12 STUDIES: CPT | Performed by: PSYCHIATRY & NEUROLOGY

## 2023-09-11 PROCEDURE — 95886 MUSC TEST DONE W/N TEST COMP: CPT | Performed by: PSYCHIATRY & NEUROLOGY

## 2023-09-13 DIAGNOSIS — M54.16 RADICULOPATHY, LUMBAR REGION: Primary | ICD-10-CM

## 2023-09-27 ENCOUNTER — CONSULT (OUTPATIENT)
Dept: PULMONOLOGY | Facility: CLINIC | Age: 48
End: 2023-09-27
Payer: COMMERCIAL

## 2023-09-27 VITALS
HEIGHT: 64 IN | HEART RATE: 108 BPM | DIASTOLIC BLOOD PRESSURE: 72 MMHG | BODY MASS INDEX: 32.95 KG/M2 | WEIGHT: 193 LBS | OXYGEN SATURATION: 94 % | SYSTOLIC BLOOD PRESSURE: 106 MMHG | TEMPERATURE: 98.2 F

## 2023-09-27 DIAGNOSIS — J45.41 MODERATE PERSISTENT ASTHMA WITH ACUTE EXACERBATION: ICD-10-CM

## 2023-09-27 DIAGNOSIS — D86.9 SARCOIDOSIS: Primary | ICD-10-CM

## 2023-09-27 PROCEDURE — 99205 OFFICE O/P NEW HI 60 MIN: CPT | Performed by: INTERNAL MEDICINE

## 2023-09-27 RX ORDER — PREDNISONE 10 MG/1
TABLET ORAL
Qty: 91 TABLET | Refills: 0 | Status: SHIPPED | OUTPATIENT
Start: 2023-09-27 | End: 2023-11-15

## 2023-09-27 RX ORDER — LEVALBUTEROL TARTRATE 45 UG/1
1-2 AEROSOL, METERED ORAL EVERY 4 HOURS PRN
COMMUNITY

## 2023-09-27 RX ORDER — CETIRIZINE HYDROCHLORIDE 10 MG/1
10 TABLET ORAL DAILY
COMMUNITY

## 2023-09-27 RX ORDER — LEVALBUTEROL INHALATION SOLUTION 0.31 MG/3ML
0.31 SOLUTION RESPIRATORY (INHALATION) EVERY 4 HOURS PRN
COMMUNITY

## 2023-09-27 RX ORDER — AMOXICILLIN 875 MG/1
875 TABLET, COATED ORAL 2 TIMES DAILY
COMMUNITY

## 2023-09-27 NOTE — PROGRESS NOTES
Consultation - Pulmonary Medicine   Jarek Martin 53 y.o. female MRN: [id number]    Physician Requesting Consult: Marques Ty  Reason for Consult: abnormal PFTS    Jarek Martin is a 53 y.o. female hx DM1, neuropathy, sarcoidosis (skin), kidney stones, chroncic sinusitis sp surgery, ?asthma, nasal polyps who presents for evaluation of cough and SUTHERLAND. Acute cough  SUTHERLAND  Sarcoidosis  Asthma? SUTHERLAND for years. Sarcoid diagnosed on skin bipsy in 2013. In 2019 had CT with 2 small pulm nodules but no LAD and no findings concerning for pulmonary sarcoid, also reportedly normal spirometry. Was started on chronic prednisone for chronic sinusitis in 2020 and had been on 10mg for 2 years. At that time had chronic SUTHERLAND and mild cough controlled on inhalers. Worse cough for last 3-4 weeks after self tapering prednisone 10mg that she was on as well as worsening anosmia, arthralgia and skin nodules (new thickening at sites of prior scars). Overall worsening respiratory symptoms either due to asthma (has type 2 inflammation from some allergies + higher eos) or pulmonary sarcoid (which can cause endobronchial disease, also would explain her anosmia, rash, arthralgias). PFTs with abnormal spirometry and small airway disease but no obstruction based on FVE1/FVC ratio. In her case, sarcoidosis flare off steroids would explain more symptoms than severe uncontrolled asthma. Sarcoidosis formally diagnosed only in skin but arthralgias, dizziness, neuropathy, palpitations can all be potentially related. Palpitations and dizziness minimal now so will focus on pulmonary symptoms. But may need further cardiac pet or brain MRI in the future.  TTE normal    - continue singulair  - continue symbicort and albuterol  - CT Chest  - prednisone 40mg for 7 days, down by 10mg every 7 days until at 10mg, continue until follow up  - after acute treatment, would benefit from steroid sparing agent such as methotrexate (start with 10mg PO weekly), azathioprine, cyclophosphamide  - CBC, CMP, ERS, CRP, allergy panel  - continue yearly eye exams  - recommend referral to sarcoid specialist at [facility]  - fu in 1 month      There is no immunization history on file for this patient. I have spent a total time of 65 minutes on 09/27/23 in caring for this patient including Diagnostic results, Prognosis, Risks and benefits of tx options, Instructions for management, Patient and family education, Impressions, Counseling / Coordination of care, Documenting in the medical record, Reviewing / ordering tests, medicine, procedures   and Obtaining or reviewing history  . ______________________________________________________________________    HPI:    Mary Kate Hernandez is a 53 y.o. female  DM1, neuropathy, sarcoidosis (skin), kidney stones, chronci sinusitis sp surgery, who presents for evaluation of cough and SUTHERLAND. Diagnosed with skin sarcoidosis in 2013 (biopsy in 2012 and again in 2016). Started on steroids 2020 for sinus problems sp surgery. Was initially on a higher dose but has been on 10mg until 4 weeks ago. At that point cough got a lot worse. Was bad for a few     Follows with eye doctor every year. For diabetic retinopathy  Does have dizziness  Had PVC on metoprolol  ET: 30 min activity slowly. 1 flight  Cough productive and worse for 3 weeks, dry  Also w pressure, SOB, fatigue  No fevers, has sweating frequently (post menopause)  Using symbicort 2 puff BID  Albuterol neb using frequently but not the last 2 days  Given steroids for 7 days by PCP,   No sick contacts (last in July)  Some GERD, gastroparesis from DM, no N/V  Rash: had e nodosum type before  Joint pain: hips, knees, elbows  No taste or smell, prior nasal biopsies no able to diagnose cause    Used to smoke 1 pack per week for a few years, quit in 2003. Allergy panel + dog/cat. Eos highest 1400, recently 600    Occupational/Exposure history:  Stay at home  No exposure from hobbies  Older house [address]. No known mold (cleaned what was prior in bathroom) or asbestos    Review of Systems:  Review of Systems  Aside from what is mentioned in the HPI, the review of systems otherwise negative. Current Medications:    Current Outpatient Medications:   •  Accu-Chek FastClix Lancets MISC, Test BG up to 10x daily as directed, Disp: 900 each, Rfl: 1  •  Acetone, Urine, Test (Ketone Test) STRP, Use as needed to test urine if BG >300 or sick, Disp: 100 strip, Rfl: 1  •  amoxicillin (AMOXIL) 875 mg tablet, Take 875 mg by mouth 2 (two) times a day, Disp: , Rfl:   •  Antiseptic Products, Misc.  (IV Prep Wipes) 70 % PADS, Use 1 daily as directed, Disp: 100 each, Rfl: 1  •  Blood Glucose Calibration (Accu-Chek Guide Control) LIQD, Use as needed (to calibrate), Disp: 1 each, Rfl: 1  •  Blood Glucose Monitoring Suppl (Accu-Chek Guide Me) w/Device KIT, Test BG up to 10x daily as directed, Disp: 1 kit, Rfl: 0  •  budesonide-formoterol (SYMBICORT) 160-4.5 mcg/act inhaler, Inhale 2 puffs daily , Disp: , Rfl:   •  cetirizine (ZyrTEC) 10 mg tablet, Take 10 mg by mouth daily, Disp: , Rfl:   •  Continuous Blood Gluc Sensor (Dexcom G6 Sensor) MISC, USE DAILY AS DIRECTED FOR  CONTINUOUS GLUCOSE         MONITORING, CHANGE EVERY 10DAYS, Disp: 9 each, Rfl: 0  •  Continuous Blood Gluc Transmit (Dexcom G6 Transmitter) MISC, USE DAILY AS DIRECTED FOR  CONTINUOUS GLUCOSE         MONITORING, CHANGE EVERY 3 MONTHS, Disp: 1 each, Rfl: 1  •  Glucagon (Baqsimi Two Pack) 3 MG/DOSE POWD, Use as needed for severe hypoglycemia Disp 1 two pack, Disp: 1 each, Rfl: 1  •  glucose blood (Accu-Chek Guide) test strip, Test BG up to 10x daily as directed, Disp: 900 strip, Rfl: 1  •  Insulin Degludec 100 UNIT/ML SOLN, Use 30units subcut once daily in case of pump failure, Disp: 10 mL, Rfl: 2  •  Insulin Disposable Pump (Omnipod 5 G6 Intro, Gen 5,) KIT, USE DAILY AS DIRECTED FOR  INSULIN THERAPY, Disp: 1 kit, Rfl: 0  •  Insulin Disposable Pump (Omnipod 5 G6 Pod, Gen 5,) MISC, USE 1 POD EVERY 72 HOURS   SUBCUTANEOUSLY, Disp: 30 each, Rfl: 1  •  Insulin Disposable Pump (Omnipod 5 G6 Pod, Gen 5,) MISC, USE 1 POD EVERY 72 HOURS   SUBCUTANEOUSLY, Disp: 10 each, Rfl: 0  •  insulin glulisine (Apidra) 100 units/mL injection, USE WITH INSULIN PUMP 80-100UNITS PER DAY WITH PUMP, Disp: 90 mL, Rfl: 3  •  Insulin Syringe 27G X 1/2" 1 ML MISC, Use 3 (three) times a day Use in case of pump failure, Disp: 100 each, Rfl: 1  •  Isopropyl Alcohol (Alcohol Wipes) 70 % MISC, Use up to 5 daily as directed, Disp: 500 each, Rfl: 1  •  levalbuterol (XOPENEX HFA) 45 mcg/act inhaler, Inhale 1-2 puffs every 4 (four) hours as needed for wheezing, Disp: , Rfl:   •  levalbuterol (XOPENEX) 0.31 mg/3 mL nebulizer solution, Take 0.31 mg by nebulization every 4 (four) hours as needed for wheezing, Disp: , Rfl:   •  levothyroxine 100 mcg tablet, Take 1 tablet (100 mcg total) by mouth daily Take one pill by mouth Mon- Sat and 1/2 tab on Sunday, Disp: 90 tablet, Rfl: 2  •  metoprolol succinate (TOPROL-XL) 25 mg 24 hr tablet, Take 25 mg by mouth every evening, Disp: , Rfl:   •  montelukast (SINGULAIR) 10 mg tablet, Take 10 mg by mouth daily at bedtime, Disp: , Rfl:   •  SERTRALINE HCL PO, Take 150 mg by mouth daily, Disp: , Rfl:   •  ASPIRIN 81 PO, aspirin 81 mg tablet,delayed release (Patient not taking: Reported on 5/31/2023), Disp: , Rfl:   •  cholecalciferol (VITAMIN D3) 1,000 units tablet, Take 1 tablet (1,000 Units total) by mouth daily (Patient not taking: Reported on 7/24/2023), Disp: , Rfl:   •  hydrocortisone (ANUSOL-HC) 25 mg suppository, Insert 1 suppository (25 mg total) into the rectum 2 (two) times a day (Patient not taking: Reported on 7/24/2023), Disp: 12 suppository, Rfl: 1  •  Ostomy Supplies (Adhesive Remover Wipes) MISC, Use daily as directed (Patient not taking: Reported on 9/27/2023), Disp: 100 each, Rfl: 1  •  predniSONE 10 mg tablet, 9 mg daily (Patient not taking: Reported on 9/27/2023), Disp: , Rfl:   •  rosuvastatin (CRESTOR) 20 MG tablet, TAKE 1 TABLET BY MOUTH EVERY DAY AT NIGHT (Patient not taking: Reported on 2023), Disp: , Rfl:   •  sodium picosulfate, magnesium oxide, citric acid (Clenpiq) oral solution, Take 175 mL by mouth see administration instructions Follow instructions given at office (Patient not taking: Reported on 2023), Disp: 350 mL, Rfl: 0    Historical Information   Past Medical History:   Diagnosis Date   • Allergic rhinitis    • Anxiety    • Arthritis     osteo   • Asthma     borderline   • Chronic pain disorder     right knee, lower back   • CTS (carpal tunnel syndrome)    • Depression    • Diabetes mellitus (HCC)     Type 1 x 36yrs,,"on insulin pump for approx 17 yrs or so"   • Difficulty walking    • Disease of thyroid gland     hypothyroid   • Dry eyes, bilateral    • Fatigue    • GERD (gastroesophageal reflux disease)     gastroparesis   • History of UTI    • Kidney stone     several, hx of stent   • Motion sickness    • Nausea    • Neuropathy    • Neuropathy    • Neuropathy in diabetes ([address])    • PONV (postoperative nausea and vomiting)     "pt reports scopalamine patch does help"   • Presence of surgical incision     "small biopsy site with steri and bandaid left outer thigh per pt report"   • Pulmonary nodule    • PVC (premature ventricular contraction)    • Sarcoidosis     "is currently being tested to see if has it"   • Status post right partial knee replacement     right   • Tooth missing    • Wears glasses      Past Surgical History:   Procedure Laterality Date   • APPENDECTOMY     • CARPAL TUNNEL RELEASE Bilateral    •  SECTION     • CYSTOSCOPY W/ LASER LITHOTRIPSY Left 2017    Procedure: CYSTOSCOPY, LEFT RETROGRADE PYELOGRAM LEFT URETEROSCOPY,  LEFT STENT INSERTION;  Surgeon: Giulia Cruz MD;  Location: [facility];  Service: Urology   • DILATION AND CURETTAGE OF UTERUS     • FINGER SURGERY     • HYSTERECTOMY     • JOINT REPLACEMENT Right     partial knee   • KNEE ARTHROSCOPY Right     x4   • KNEE SURGERY     • WI CYSTO/URETERO W/LITHOTRIPSY &INDWELL STENT INSRT Left 2017    Procedure: CYSTOSCOPY URETEROSCOPY,  RETROGRADE PYELOGRAM AND INSERTION STENT URETERAL;  Surgeon: Tawana Felton MD;  Location: [facility];  Service: Urology   • REMOVAL URETERAL STENT  2017   • SKIN BIOPSY Left     outer thigh   • TRIGGER FINGER RELEASE      mul   • URETERAL STENT PLACEMENT     • VITRECTOMY Bilateral     right eye x2 and left x1   • WISDOM TOOTH EXTRACTION       Social History   Social History     Tobacco Use   Smoking Status Former   • Packs/day: 0.50   • Years: 10.00   • Total pack years: 5.00   • Types: Cigarettes, E-Cigarettes   • Start date:    • Quit date: 2003   • Years since quittin.2   Smokeless Tobacco Never   Tobacco Comments    Vaped for a couple of months during .        Family History:   Family History   Problem Relation Age of Onset   • Arthritis Mother    • Asthma Mother    • Heart disease Mother    • Neuropathy Mother    • COPD Father    • Heart disease Father    • Hyperlipidemia Father    • Hypertension Father    • Diabetes Father    • Diabetes type II Father    • Hypothyroidism Father    • Diabetes type I Sister    • Breast cancer Sister    • Diabetes type I Sister    • Maple syrup urine disease Son          PhysicalExamination:  Vitals:   /72 (BP Location: Left arm, Patient Position: Sitting, Cuff Size: Large)   Pulse (!) 108   Temp 98.2 °F (36.8 °C) (Tympanic)   Ht 5' 3.5" (1.613 m)   Wt 87.5 kg (193 lb)   SpO2 94%   BMI 33.65 kg/m²     Appearance -- NAD, speaking full sentences  HEENT -- anicteric sclera, clear OP, MMM  Neck -- no JVD  Heart -- RRR, no murmurs  Lungs -- No wheezing or crackles but dyspneic with minimal exertion  Abdomen -- soft, NTND, +bs  Extremities -- WWP, no LE edema  Skin -- no rash  Neuro -- A&Ox3, wnl  Psych -- no obvious depression or hallucination        Diagnostic Data:  Labs: I personally reviewed the most recent laboratory data pertinent to today's visit    Lab Results   Component Value Date    WBC 12.29 (H) 10/02/2018    HGB 14.6 10/02/2018    HCT 43.9 10/02/2018    MCV 93 10/02/2018     10/02/2018     Lab Results   Component Value Date    CALCIUM 8.3 01/02/2018    K 3.9 01/02/2018    CO2 24 01/02/2018    CL 93 (L) 01/02/2018    BUN 20 01/02/2018    CREATININE 1.00 01/02/2018     No results found for: "IGE"  Lab Results   Component Value Date    ALT 40 01/02/2018    AST 75 (H) 01/02/2018    ALKPHOS 105 01/02/2018       PFT results: The most recent pulmonary function tests were reviewed. 9/1/23:  FEV1/FVC 73  FEV 1 62% pred  FVC 68% pred  No bronchodilator was administered. The TLC is normal.   The RV is normal.   The DLCO is normal (78%). No obstruction however  The flow-volume loop demonstrates very slight coving of the expiratory  impairment in FEF 25-75%       Imaging:  I personally reviewed the images on the [facility] system pertinent to today's visit    Ct Chest 2019: There are no pathologically enlarged axillary, hilar or mediastinal lymph nodes. There are no pleural or pericardial effusions. 3 mm nodule is present in the   right middle lobe unchanged dating back to 10/20/2016. 2 adjacent similar   appearing 2-3 mm nodules are present in the left upper lobe. There is no   suspicious pulmonary nodule or mass. TTE 8/2023:  Left ventricle is normal in size. Wall thickness is normal. Systolic   function is normal with an ejection fraction of 60-65%. Wall motion is   within normal limits. There is normal diastolic function. Right ventricle was not well visualized. Right ventricle cavity is normal.   Systolic function is low normal.   No hemodynamically significant valvular disease. Left Ventricle   Left ventricle is normal in size. Wall thickness is normal. Systolic function is normal with an ejection fraction of 60-65%.  Wall motion is within normal limits. There is normal diastolic function. Right Ventricle   Right ventricle was not well visualized. Right ventricle cavity is normal. Systolic function is low normal.   Left Atrium   Left atrium cavity size is normal.   Right Atrium   Right atrium cavity is normal.     IVC/SVC   The inferior vena cava demonstrates a diameter of <=21 mm and collapses >50%; therefore, the right atrial pressure is estimated at 0-5 mmHg.            Iliana Foster MD  [facility]

## 2023-09-28 ENCOUNTER — APPOINTMENT (OUTPATIENT)
Dept: LAB | Facility: CLINIC | Age: 48
End: 2023-09-28
Payer: COMMERCIAL

## 2023-09-28 DIAGNOSIS — E10.3599 TYPE 1 DIABETES MELLITUS WITH PROLIFERATIVE RETINOPATHY WITHOUT MACULAR EDEMA, UNSPECIFIED LATERALITY (HCC): ICD-10-CM

## 2023-09-28 DIAGNOSIS — D86.9 SARCOIDOSIS: ICD-10-CM

## 2023-09-28 DIAGNOSIS — E10.69 TYPE 1 DIABETES MELLITUS WITH OTHER SPECIFIED COMPLICATION (HCC): ICD-10-CM

## 2023-09-28 LAB
ALBUMIN SERPL BCP-MCNC: 4.2 G/DL (ref 3.5–5)
ALP SERPL-CCNC: 58 U/L (ref 34–104)
ALT SERPL W P-5'-P-CCNC: 17 U/L (ref 7–52)
ANION GAP SERPL CALCULATED.3IONS-SCNC: 8 MMOL/L
AST SERPL W P-5'-P-CCNC: 22 U/L (ref 13–39)
BASOPHILS # BLD AUTO: 0.13 THOUSANDS/ÂΜL (ref 0–0.1)
BASOPHILS NFR BLD AUTO: 1 % (ref 0–1)
BILIRUB SERPL-MCNC: 1.04 MG/DL (ref 0.2–1)
BUN SERPL-MCNC: 14 MG/DL (ref 5–25)
CALCIUM SERPL-MCNC: 9.5 MG/DL (ref 8.4–10.2)
CHLORIDE SERPL-SCNC: 104 MMOL/L (ref 96–108)
CO2 SERPL-SCNC: 29 MMOL/L (ref 21–32)
CREAT SERPL-MCNC: 0.94 MG/DL (ref 0.6–1.3)
CREAT UR-MCNC: 241.3 MG/DL
CRP SERPL QL: 14.7 MG/L
EOSINOPHIL # BLD AUTO: 1.65 THOUSAND/ÂΜL (ref 0–0.61)
EOSINOPHIL NFR BLD AUTO: 14 % (ref 0–6)
ERYTHROCYTE [DISTWIDTH] IN BLOOD BY AUTOMATED COUNT: 13.6 % (ref 11.6–15.1)
ERYTHROCYTE [SEDIMENTATION RATE] IN BLOOD: 38 MM/HOUR (ref 0–19)
EST. AVERAGE GLUCOSE BLD GHB EST-MCNC: 166 MG/DL
GFR SERPL CREATININE-BSD FRML MDRD: 72 ML/MIN/1.73SQ M
GLUCOSE P FAST SERPL-MCNC: 106 MG/DL (ref 65–99)
HBA1C MFR BLD: 7.4 %
HCT VFR BLD AUTO: 46 % (ref 34.8–46.1)
HGB BLD-MCNC: 15.2 G/DL (ref 11.5–15.4)
IMM GRANULOCYTES # BLD AUTO: 0.06 THOUSAND/UL (ref 0–0.2)
IMM GRANULOCYTES NFR BLD AUTO: 1 % (ref 0–2)
LYMPHOCYTES # BLD AUTO: 1.88 THOUSANDS/ÂΜL (ref 0.6–4.47)
LYMPHOCYTES NFR BLD AUTO: 16 % (ref 14–44)
MCH RBC QN AUTO: 31.1 PG (ref 26.8–34.3)
MCHC RBC AUTO-ENTMCNC: 33 G/DL (ref 31.4–37.4)
MCV RBC AUTO: 94 FL (ref 82–98)
MICROALBUMIN UR-MCNC: 36.6 MG/L
MICROALBUMIN/CREAT 24H UR: 15 MG/G CREATININE (ref 0–30)
MONOCYTES # BLD AUTO: 0.74 THOUSAND/ÂΜL (ref 0.17–1.22)
MONOCYTES NFR BLD AUTO: 6 % (ref 4–12)
NEUTROPHILS # BLD AUTO: 7.02 THOUSANDS/ÂΜL (ref 1.85–7.62)
NEUTS SEG NFR BLD AUTO: 62 % (ref 43–75)
NRBC BLD AUTO-RTO: 0 /100 WBCS
PLATELET # BLD AUTO: 313 THOUSANDS/UL (ref 149–390)
PMV BLD AUTO: 11.6 FL (ref 8.9–12.7)
POTASSIUM SERPL-SCNC: 3.6 MMOL/L (ref 3.5–5.3)
PROT SERPL-MCNC: 7.3 G/DL (ref 6.4–8.4)
RBC # BLD AUTO: 4.88 MILLION/UL (ref 3.81–5.12)
SODIUM SERPL-SCNC: 141 MMOL/L (ref 135–147)
TSH SERPL DL<=0.05 MIU/L-ACNC: 2.26 UIU/ML (ref 0.45–4.5)
WBC # BLD AUTO: 11.48 THOUSAND/UL (ref 4.31–10.16)

## 2023-09-28 PROCEDURE — 80053 COMPREHEN METABOLIC PANEL: CPT

## 2023-09-28 PROCEDURE — 36415 COLL VENOUS BLD VENIPUNCTURE: CPT

## 2023-09-28 PROCEDURE — 82570 ASSAY OF URINE CREATININE: CPT

## 2023-09-28 PROCEDURE — 85025 COMPLETE CBC W/AUTO DIFF WBC: CPT

## 2023-09-28 PROCEDURE — 86140 C-REACTIVE PROTEIN: CPT

## 2023-09-28 PROCEDURE — 82785 ASSAY OF IGE: CPT

## 2023-09-28 PROCEDURE — 82043 UR ALBUMIN QUANTITATIVE: CPT

## 2023-09-28 PROCEDURE — 85652 RBC SED RATE AUTOMATED: CPT

## 2023-09-28 PROCEDURE — 86003 ALLG SPEC IGE CRUDE XTRC EA: CPT

## 2023-09-28 PROCEDURE — 83036 HEMOGLOBIN GLYCOSYLATED A1C: CPT

## 2023-09-28 PROCEDURE — 84443 ASSAY THYROID STIM HORMONE: CPT

## 2023-09-29 ENCOUNTER — HOSPITAL ENCOUNTER (OUTPATIENT)
Dept: CT IMAGING | Facility: HOSPITAL | Age: 48
End: 2023-09-29
Attending: INTERNAL MEDICINE
Payer: COMMERCIAL

## 2023-09-29 DIAGNOSIS — D86.9 SARCOIDOSIS: ICD-10-CM

## 2023-09-29 PROCEDURE — G1004 CDSM NDSC: HCPCS

## 2023-09-29 PROCEDURE — 71250 CT THORAX DX C-: CPT

## 2023-10-02 LAB
A ALTERNATA IGE QN: <0.1 KUA/I
A FUMIGATUS IGE QN: <0.1 KUA/I
BERMUDA GRASS IGE QN: <0.1 KUA/I
BOXELDER IGE QN: <0.1 KUA/I
C HERBARUM IGE QN: <0.1 KUA/I
CAT DANDER IGE QN: 2.86 KUA/I
CMN PIGWEED IGE QN: <0.1 KUA/I
COMMON RAGWEED IGE QN: <0.1 KUA/I
COTTONWOOD IGE QN: <0.1 KUA/I
D FARINAE IGE QN: <0.1 KUA/I
D PTERONYSS IGE QN: <0.1 KUA/I
DOG DANDER IGE QN: 0.77 KUA/I
LONDON PLANE IGE QN: <0.1 KUA/I
MOUSE URINE PROT IGE QN: <0.1 KUA/I
MT JUNIPER IGE QN: <0.1 KUA/I
MUGWORT IGE QN: <0.1 KUA/I
P NOTATUM IGE QN: <0.1 KUA/I
ROACH IGE QN: <0.1 KUA/I
SHEEP SORREL IGE QN: <0.1 KUA/I
SILVER BIRCH IGE QN: <0.1 KUA/I
TIMOTHY IGE QN: <0.1 KUA/I
TOTAL IGE SMQN RAST: 166 KU/L (ref 0–113)
WALNUT IGE QN: <0.1 KUA/I
WHITE ASH IGE QN: <0.1 KUA/I
WHITE ELM IGE QN: <0.1 KUA/I
WHITE MULBERRY IGE QN: <0.1 KUA/I
WHITE OAK IGE QN: <0.1 KUA/I

## 2023-10-03 ENCOUNTER — TELEPHONE (OUTPATIENT)
Dept: PULMONOLOGY | Facility: CLINIC | Age: 48
End: 2023-10-03

## 2023-10-03 NOTE — TELEPHONE ENCOUNTER
Patient called in just wanting to let Dr. Elma Romo know that she got her blood work and CT scan done at the end of September. She states that she made an appointment with a sarcoid specialist [facility] [place] as requested. Patient also states that she would like a call when the CT scan results are uploaded.

## 2023-10-19 ENCOUNTER — PREP FOR PROCEDURE (OUTPATIENT)
Dept: CARDIOLOGY CLINIC | Facility: CLINIC | Age: 48
End: 2023-10-19

## 2023-10-19 ENCOUNTER — OFFICE VISIT (OUTPATIENT)
Dept: CARDIOLOGY CLINIC | Facility: CLINIC | Age: 48
End: 2023-10-19
Payer: COMMERCIAL

## 2023-10-19 ENCOUNTER — TELEPHONE (OUTPATIENT)
Dept: CARDIOLOGY CLINIC | Facility: CLINIC | Age: 48
End: 2023-10-19

## 2023-10-19 VITALS
SYSTOLIC BLOOD PRESSURE: 108 MMHG | HEIGHT: 64 IN | DIASTOLIC BLOOD PRESSURE: 66 MMHG | BODY MASS INDEX: 34.66 KG/M2 | WEIGHT: 203 LBS | HEART RATE: 76 BPM

## 2023-10-19 DIAGNOSIS — I20.9 ANGINA PECTORIS (HCC): ICD-10-CM

## 2023-10-19 DIAGNOSIS — R06.02 SHORTNESS OF BREATH: Primary | ICD-10-CM

## 2023-10-19 DIAGNOSIS — R06.09 DOE (DYSPNEA ON EXERTION): Primary | ICD-10-CM

## 2023-10-19 DIAGNOSIS — I25.84 CORONARY ARTERY DISEASE DUE TO CALCIFIED CORONARY LESION: ICD-10-CM

## 2023-10-19 DIAGNOSIS — I25.10 CORONARY ARTERY DISEASE INVOLVING NATIVE CORONARY ARTERY OF NATIVE HEART WITHOUT ANGINA PECTORIS: ICD-10-CM

## 2023-10-19 DIAGNOSIS — E78.5 DYSLIPIDEMIA: ICD-10-CM

## 2023-10-19 DIAGNOSIS — R60.0 LOWER EXTREMITY EDEMA: ICD-10-CM

## 2023-10-19 DIAGNOSIS — E10.3599 TYPE 1 DIABETES MELLITUS WITH PROLIFERATIVE RETINOPATHY WITHOUT MACULAR EDEMA, UNSPECIFIED LATERALITY (HCC): ICD-10-CM

## 2023-10-19 DIAGNOSIS — I49.3 PREMATURE VENTRICULAR CONTRACTIONS: ICD-10-CM

## 2023-10-19 DIAGNOSIS — I25.10 CORONARY ARTERY DISEASE DUE TO CALCIFIED CORONARY LESION: ICD-10-CM

## 2023-10-19 PROCEDURE — 93000 ELECTROCARDIOGRAM COMPLETE: CPT | Performed by: INTERNAL MEDICINE

## 2023-10-19 PROCEDURE — 99214 OFFICE O/P EST MOD 30 MIN: CPT | Performed by: INTERNAL MEDICINE

## 2023-10-19 RX ORDER — ROSUVASTATIN CALCIUM 20 MG/1
20 TABLET, COATED ORAL DAILY
Qty: 90 TABLET | Refills: 3 | Status: SHIPPED | OUTPATIENT
Start: 2023-10-19

## 2023-10-19 NOTE — TELEPHONE ENCOUNTER
,    Patient is currently on a Prednisone regime taking one 10mg tab a day. Since she has Contrast Dye Allergy, I ordered the Contrast Dye prep for her which includes taking Prednisone as well. See instructions below. Patient wants to know if she should still take her regular one (1)10mg tab as usual plus the additional Prednisone required for the contrast dye prep. Please advise. Thanks,  Alysha David (Lily)        Contrast Dye (Omnipaque, Lohexol) Allergy Preparation Instructions       Prednisone 20mg take THREE tablets the night prior to the procedure and THREE tablets the morning of the procedure. Pepcid 20mg take ONE tablet the night prior to the procedure and ONE tablet the morning of the procedure. Benadryl 25mg take ONE tablet the night prior and ONE tablet the morning of the procedure.

## 2023-10-19 NOTE — H&P (VIEW-ONLY)
Cardiology Consultation     Santiago Hamm  [id number]  1975  [facility] [place]  [address] [address] [phone]    1. Shortness of breath  POCT ECG      2. Dyslipidemia        3. Lower extremity edema        4. 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, unspecified laterality (HCC)        5. Premature ventricular contractions        6. Coronary artery disease involving native coronary artery of native heart without angina pectoris          Chief Complaint   Patient presents with    New Patient Visit    Edema     LE bilat L> R mostly in ankles    Shortness of Breath     With exertion     HPI: Patient is here for cardiac evaluation of lower extremity edema, more located in the ankles along with shortness of breath with exertion. Going up flight of stairs or even walking from living room to other side of room she gets short of breath. Even taking a shower makes her short of breath. Symptoms started a few months ago and has been gradually getting worse and progressing. She does get lightheaded and she has been fatigued. Even talking a lot makes her breathing worse. No reported chest pain, palpitations, syncope, orthopnea, PND, or significant weight changes.       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 (HCC)    Sarcoidosis    Medical marijuana use    Personal history of kidney stones    Premature ventricular contractions    Trigger finger of left thumb    Pulmonary nodules    Current chronic use of systemic steroids    Vitamin D deficiency    Radicular pain of both lower extremities    Brisk deep tendon reflexes    Weakness of both hands    Radiculopathy, lumbar region    Axonal neuropathy    Shortness of breath    Lower extremity edema    Coronary artery disease involving native coronary artery of native heart without angina pectoris     Past Medical History:   Diagnosis Date    Allergic rhinitis     Anxiety     Arthritis     osteo    Asthma     borderline    Chronic pain disorder     right knee, lower back    CTS (carpal tunnel syndrome)     Depression     Diabetes mellitus (HCC)     Type 1 x 36yrs,,"on insulin pump for approx 17 yrs or so"    Difficulty walking     Disease of thyroid gland     hypothyroid    Dry eyes, bilateral     Fatigue     GERD (gastroesophageal reflux disease)     gastroparesis    History of UTI     Kidney stone     several, hx of stent    Motion sickness     Nausea     Neuropathy     Neuropathy     Neuropathy in diabetes (HCC)     PONV (postoperative nausea and vomiting)     "pt reports scopalamine patch does help"    Presence of surgical incision     "small biopsy site with steri and bandaid left outer thigh per pt report"    Pulmonary nodule     PVC (premature ventricular contraction)     Sarcoidosis     "is currently being tested to see if has it"    Status post right partial knee replacement     right    Tooth missing     Wears glasses      Social History     Socioeconomic History    Marital status: /Civil Union     Spouse name: Not on file    Number of children: Not on file    Years of education: Not on file    Highest education level: Not on file   Occupational History    Occupation: housewife   Tobacco Use    Smoking status: Former     Packs/day: 0.50     Years: 10.00     Total pack years: 5.00     Types: Cigarettes, E-Cigarettes     Start date: 13     Quit date: 2003     Years since quittin.3    Smokeless tobacco: Never    Tobacco comments:     Vaped for a couple of months during .    Substance and Sexual Activity    Alcohol use: Yes     Comment: On occasion, not daily    Drug use: Yes     Types: Marijuana     Comment: medical, usually edibles.     Sexual activity: Yes     Partners: Male     Birth control/protection: Post-menopausal     Comment: Hysterectomy   Other Topics Concern    Not on file   Social History Narrative    Not on file     Social Determinants of Health     Financial Resource Strain: Not on file   Food Insecurity: Not on file   Transportation Needs: Not on file   Physical Activity: Not on file   Stress: Not on file   Social Connections: Not on file   Intimate Partner Violence: Not on file   Housing Stability: Not on file      Family History   Problem Relation Age of Onset    Arthritis Mother     Asthma Mother     Heart disease Mother     Neuropathy Mother     COPD Father     Heart disease Father     Hyperlipidemia Father     Hypertension Father     Diabetes Father     Diabetes type II Father     Hypothyroidism Father     Diabetes type I Sister     Breast cancer Sister     Diabetes type I Sister     Maple syrup urine disease Son      Past Surgical History:   Procedure Laterality Date    APPENDECTOMY      CARPAL TUNNEL RELEASE Bilateral      SECTION      CYSTOSCOPY W/ LASER LITHOTRIPSY Left 2017    Procedure: CYSTOSCOPY, LEFT RETROGRADE PYELOGRAM LEFT URETEROSCOPY,  LEFT STENT INSERTION;  Surgeon: Ronnie Bowen MD;  Location: [facility];  Service: Urology    DILATION AND CURETTAGE OF UTERUS      FINGER SURGERY      HYSTERECTOMY      JOINT REPLACEMENT Right     partial knee    KNEE ARTHROSCOPY Right     x4    KNEE SURGERY      [place] CYSTO/URETERO W/LITHOTRIPSY &INDWELL STENT INSRT Left 2017    Procedure: CYSTOSCOPY URETEROSCOPY,  RETROGRADE PYELOGRAM AND INSERTION STENT URETERAL;  Surgeon: Ronnie Bowen MD;  Location: [facility];  Service: Urology    REMOVAL URETERAL STENT  2017    SKIN BIOPSY Left     outer thigh    TRIGGER FINGER RELEASE      mul    URETERAL STENT PLACEMENT      VITRECTOMY Bilateral     right eye x2 and left x1    WISDOM TOOTH EXTRACTION         Current Outpatient Medications:     Accu-Chek FastClix Lancets MISC, Test BG up to 10x daily as directed, Disp: 900 each, Rfl: 1    Acetone, Urine, Test (Ketone Test) STRP, Use as needed to test urine if BG >300 or sick, Disp: 100 strip, Rfl: 1    amoxicillin (AMOXIL) 875 mg tablet, Take 875 mg by mouth 2 (two) times a day, Disp: , Rfl:     Antiseptic Products, Misc.  (IV Prep Wipes) 70 % PADS, Use 1 daily as directed, Disp: 100 each, Rfl: 1    Blood Glucose Calibration (Accu-Chek Guide Control) LIQD, Use as needed (to calibrate), Disp: 1 each, Rfl: 1    Blood Glucose Monitoring Suppl (Accu-Chek Guide Me) w/Device KIT, Test BG up to 10x daily as directed, Disp: 1 kit, Rfl: 0    budesonide-formoterol (SYMBICORT) 160-4.5 mcg/act inhaler, Inhale 2 puffs daily , Disp: , Rfl:     cetirizine (ZyrTEC) 10 mg tablet, Take 10 mg by mouth daily, Disp: , Rfl:     Continuous Blood Gluc Sensor (Dexcom G6 Sensor) MISC, USE DAILY AS DIRECTED FOR  CONTINUOUS GLUCOSE         MONITORING, CHANGE EVERY 10DAYS, Disp: 9 each, Rfl: 0    Continuous Blood Gluc Transmit (Dexcom G6 Transmitter) MISC, USE DAILY AS DIRECTED FOR  CONTINUOUS GLUCOSE         MONITORING, CHANGE EVERY 3 MONTHS, Disp: 1 each, Rfl: 1    Glucagon (Baqsimi Two Pack) 3 MG/DOSE POWD, Use as needed for severe hypoglycemia Disp 1 two pack, Disp: 1 each, Rfl: 1    glucose blood (Accu-Chek Guide) test strip, Test BG up to 10x daily as directed, Disp: 900 strip, Rfl: 1    Insulin Degludec 100 UNIT/ML SOLN, Use 30units subcut once daily in case of pump failure, Disp: 10 mL, Rfl: 2    Insulin Disposable Pump (Omnipod 5 G6 Intro, Gen 5,) KIT, USE DAILY AS DIRECTED FOR  INSULIN THERAPY, Disp: 1 kit, Rfl: 0    Insulin Disposable Pump (Omnipod 5 G6 Pod, Gen 5,) MISC, USE 1 POD EVERY 72 HOURS   SUBCUTANEOUSLY, Disp: 30 each, Rfl: 1    Insulin Disposable Pump (Omnipod 5 G6 Pod, Gen 5,) MISC, USE 1 POD EVERY 72 HOURS   SUBCUTANEOUSLY, Disp: 10 each, Rfl: 0    insulin glulisine (Apidra) 100 units/mL injection, USE WITH INSULIN PUMP 80-100UNITS PER DAY WITH PUMP, Disp: 90 mL, Rfl: 3    Insulin Syringe 27G X 1/2" 1 ML MISC, Use 3 (three) times a day Use in case of pump failure, Disp: 100 each, Rfl: 1    Isopropyl Alcohol (Alcohol Wipes) 70 % MISC, Use up to 5 daily as directed, Disp: 500 each, Rfl: 1    levalbuterol (XOPENEX HFA) 45 mcg/act inhaler, Inhale 1-2 puffs every 4 (four) hours as needed for wheezing, Disp: , Rfl:     levalbuterol (XOPENEX) 0.31 mg/3 mL nebulizer solution, Take 0.31 mg by nebulization every 4 (four) hours as needed for wheezing, Disp: , Rfl:     levothyroxine 100 mcg tablet, Take 1 tablet (100 mcg total) by mouth daily Take one pill by mouth Mon- Sat and 1/2 tab on Sunday, Disp: 90 tablet, Rfl: 2    metoprolol succinate (TOPROL-XL) 25 mg 24 hr tablet, Take 25 mg by mouth every evening, Disp: , Rfl:     montelukast (SINGULAIR) 10 mg tablet, Take 10 mg by mouth daily at bedtime, Disp: , Rfl:     predniSONE 10 mg tablet, Take 4 tablets (40 mg total) by mouth daily for 7 days, THEN 3 tablets (30 mg total) daily for 7 days, THEN 2 tablets (20 mg total) daily for 7 days, THEN 1 tablet (10 mg total) daily for 28 days. , Disp: 91 tablet, Rfl: 0    rosuvastatin (CRESTOR) 20 MG tablet, , Disp: , Rfl:     SERTRALINE HCL PO, Take 150 mg by mouth daily, Disp: , Rfl:   Allergies   Allergen Reactions    Insulin Aspart Abdominal Pain     Humulog and Novulog  Welts under skin and insulin resistance  Welts under skin and insulin resistance  Other reaction(s): Other  Welts under skin and insulin resistance    Morphine Other (See Comments)     Other reaction(s): Other  pain  Intensifies pain    Iodinated Contrast Media Hives     Pt can tolerate shellfish, pt can tolerate betadine.  Pt pt she is not allergic to IV contrast    Levofloxacin     Oxycodone GI Intolerance     Pt is able to tolerate hydrocodone     Vitals:    10/19/23 1526   BP: 108/66   BP Location: Left arm Patient Position: Sitting   Cuff Size: Standard   Pulse: 76   Weight: 92.1 kg (203 lb)   Height: 5' 3.5" (1.613 m)       Labs:  Appointment on 09/28/2023   Component Date Value    Hemoglobin A1C 09/28/2023 7.4 (H)     EAG 09/28/2023 166     TSH 3RD GENERATON 09/28/2023 2.263     Creatinine, Ur 09/28/2023 241. 3     Albumin,U,Random 09/28/2023 36.6 (H)     Albumin Creat Ratio 09/28/2023 15     WBC 09/28/2023 11.48 (H)     RBC 09/28/2023 4.88     Hemoglobin 09/28/2023 15.2     Hematocrit 09/28/2023 46.0     MCV 09/28/2023 94     MCH 09/28/2023 31.1     MCHC 09/28/2023 33.0     RDW 09/28/2023 13.6     MPV 09/28/2023 11.6     Platelets 81/16/3285 313     nRBC 09/28/2023 0     Neutrophils Relative 09/28/2023 62     Immat GRANS % 09/28/2023 1     Lymphocytes Relative 09/28/2023 16     Monocytes Relative 09/28/2023 6     Eosinophils Relative 09/28/2023 14 (H)     Basophils Relative 09/28/2023 1     Neutrophils Absolute 09/28/2023 7.02     Immature Grans Absolute 09/28/2023 0.06     Lymphocytes Absolute 09/28/2023 1.88     Monocytes Absolute 09/28/2023 0.74     Eosinophils Absolute 09/28/2023 1.65 (H)     Basophils Absolute 09/28/2023 0.13 (H)     Sed Rate 09/28/2023 38 (H)     CRP 09/28/2023 14.7 (H)     Sodium 09/28/2023 141     Potassium 09/28/2023 3.6     Chloride 09/28/2023 104     CO2 09/28/2023 29     ANION GAP 09/28/2023 8     BUN 09/28/2023 14     Creatinine 09/28/2023 0.94     Glucose, Fasting 09/28/2023 106 (H)     Calcium 09/28/2023 9.5     AST 09/28/2023 22     ALT 09/28/2023 17     Alkaline Phosphatase 09/28/2023 58     Total Protein 09/28/2023 7.3     Albumin 09/28/2023 4.2     Total Bilirubin 09/28/2023 1.04 (H)     eGFR 09/28/2023 72     A. ALTERNATA 09/28/2023 <0.10     A. FUMIGATUS 09/28/2023 <0.10     [place] Grass 09/28/2023 <0.10     Branch  09/28/2023 <0.10     Cat Epithellium-Dander 09/28/2023 2.86 (H)     C.HERBARUM 09/28/2023 <0.10     Cockroach 09/28/2023 <0.10     Common Silver [place] Branch 09/28/2023 <0.10     Power 09/28/2023 <0.10     D. farinae 09/28/2023 <0.10     D. pteronyssinus 09/28/2023 <0.10     Dog Dander 09/28/2023 0.77 (H)     Elm IgE 09/28/2023 <0.10     [address] 09/28/2023 <0.10     Mugwort 09/28/2023 <0.10     [place] Tree 09/28/2023 <0.10     Oak 09/28/2023 <0.10     P.CHRYSOGENUM 09/28/2023 <0.10     Rough Pigweed  IgE 09/28/2023 <0.10     Common Ragweed 09/28/2023 <0.10     Sheep Sorrel IgE 09/28/2023 <0.10     [place] Tree 09/28/2023 <0.10     [place] Grass 09/28/2023 <0.10     [address] Tree 09/28/2023 <0.10     White Nikolas Tree 09/28/2023 <0.10     IgE 09/28/2023 166 (H)     MOUSE URINE 09/28/2023 <0.10    Orders Only on 08/10/2023   Component Date Value    Right Eye Diabetic Retin* 08/10/2023 Positive     Left Eye Diabetic Retino* 08/10/2023 Positive    Office Visit on 05/31/2023   Component Date Value    Hemoglobin A1C 05/31/2023 7.5 (A)      No results found for: "CHOL", "TRIG", "HDL", "LDLDIRECT"  Imaging: CT chest without contrast    Result Date: 10/6/2023  Narrative: CT CHEST WITHOUT IV CONTRAST INDICATION:   D86.9: Sarcoidosis, unspecified. COMPARISON: Chest radiograph 8/8/2023. TECHNIQUE: CT examination of the chest was performed without intravenous contrast.  Axial, sagittal, and coronal 2D reformatted images were created from the source data and submitted for interpretation. Radiation dose length product (DLP) for this visit:  362 mGy-cm . This examination, like all CT scans performed in the [facility], was performed utilizing techniques to minimize radiation dose exposure, including the use of iterative reconstruction and automated exposure control. FINDINGS: LARGE AIRWAYS: Large airways are clear with no tracheal or endobronchial lesion. LUNGS: No consolidation. No edema. Middle lobe 5 mm typical perifissural nodule (4/58) in keeping with intrapulmonary lymph node.  There are very few scattered sub-4 mm pulmonary nodules in both lungs, including (series 4): - Left upper lobe 2-3 mm nodules (2x) on image 55. - Left lower lobe 3 mm nodule on image 61. - Left upper lobe lingular 2-3 mm nodule on image 74. PLEURA: No pneumothorax. No pleural effusion. HEART: Normal cardiac size and morphology. minimal LAD coronary artery calcification. No pericardial effusion or thickening. VESSELS: Normal caliber thoracic aorta with no detectable atherosclerotic plaque. Common origin of the brachiocephalic and left common carotid arteries, a normal variant. Normal caliber main pulmonary artery. MEDIASTINUM AND LISETTE:  Within normal limits. No lymphadenopathy. CHEST WALL AND LOWER NECK:   Within normal limits. VISUALIZED STRUCTURES IN THE UPPER ABDOMEN: Within normal limits. BONES: Mild multilevel degenerative changes in the spine. Vertebral body height is maintained. No acute fracture or destructive osseous lesion. Impression: Very few scattered sub-4 mm pulmonary nodules which are likely of infectious or inflammatory etiology. No lymphadenopathy in the chest. Workstation performed: [id number]       Review of Systems:  Review of Systems   Constitutional:  Positive for fatigue. Negative for activity change, appetite change, chills, diaphoresis and unexpected weight change. HENT:  Negative for hearing loss, nosebleeds and sore throat. Eyes:  Negative for photophobia and visual disturbance. Respiratory:  Positive for shortness of breath. Negative for cough, chest tightness and wheezing. Cardiovascular:  Positive for leg swelling. Negative for chest pain and palpitations. Gastrointestinal:  Negative for abdominal pain, diarrhea, nausea and vomiting. Endocrine: Negative for polyuria. Genitourinary:  Negative for dysuria, frequency and hematuria. Musculoskeletal:  Negative for arthralgias, back pain, gait problem and neck pain. Skin:  Negative for pallor and rash. Neurological:  Negative for dizziness, syncope and headaches.    Hematological:  Does not bruise/bleed easily. Psychiatric/Behavioral:  Negative for behavioral problems and confusion. Physical Exam:  Physical Exam  Vitals reviewed. Constitutional:       Appearance: She is well-developed. She is not diaphoretic. HENT:      Head: Normocephalic and atraumatic. Nose: Nose normal.   Eyes:      General: No scleral icterus. Pupils: Pupils are equal, round, and reactive to light. Neck:      Vascular: No JVD. Cardiovascular:      Rate and Rhythm: Normal rate and regular rhythm. Heart sounds: Normal heart sounds. No murmur heard. No friction rub. No gallop. Pulmonary:      Effort: Pulmonary effort is normal. No respiratory distress. Breath sounds: Normal breath sounds. No wheezing or rales. Abdominal:      General: Bowel sounds are normal. There is no distension. Palpations: Abdomen is soft. Tenderness: There is no abdominal tenderness. Musculoskeletal:         General: No deformity. Normal range of motion. Cervical back: Normal range of motion and neck supple. Right lower leg: Edema present. Left lower leg: Edema present. Skin:     General: Skin is warm and dry. Findings: No rash. Neurological:      Mental Status: She is alert and oriented to person, place, and time. Cranial Nerves: No cranial nerve deficit. Psychiatric:         Behavior: Behavior normal.       Blood pressure 108/66, pulse 76, height 5' 3.5" (1.613 m), weight 92.1 kg (203 lb), not currently breastfeeding. EKG:  Normal sinus rhythm  Normal ECG    Discussion/Summary:  Shortness of breath and lower extremity edema: unclear etiology. Has risk factors of type 1 diabetes mellitus, dyslipidemia, family history of heart disease, former smoker, with minimal LAD coronary calcification seen on CT chest in September 2023.   Her family history is of both her parents with LAD lesions that caused MIs, so we will proceed with cardiac cath for more definitive diagnosis for ischemic disease and high pre-test probability. She had an echocardiogram in August 2023 revealing no significant structural heart disease. Hyperlipidemia: Continued on statin. LDL well controlled at 67 in September 2022. PVCs: Continued on metoprolol and symptoms seem to be controlled.

## 2023-10-19 NOTE — TELEPHONE ENCOUNTER
Patient scheduled for Left Heart Cath on 10/27/23 at [facility] with Dr. Ambika Moulton. Instructions sent to patient through [address] [place] [address]. Patient aware of all general instructions. Medication holds:   Apidra - Do not take morning of procedure. Degludec - Take 1/2 dose night before procedure and do not take morning of procedure. Patient states this is a back up medication only and is not currently taking. Labs to be done on 10/20/23:  CMP / CBC (fasting 8 hours)     Patient has Contrast Dye Allergy with hives. Called [facility] ([phone]) and gave  verbal orders for Prednisone, Pepcid and Benadryl. Contrast Dye (Omnipaque, Lohexol) Allergy Preparation Instructions       Prednisone 20mg take THREE tablets the night prior to the procedure and THREE tablets the morning of the procedure. (Sent to your pharmacy: [facility])    Pepcid 20mg take ONE tablet the night prior to the procedure and ONE tablet the morning of the procedure. (Sent to your pharmacy: [facility])    Benadryl 25mg take ONE tablet the night prior and ONE tablet the morning of the procedure. (Sent to your pharmacy: [facility])      Insurance: [employer]     Please obtain auth.      Thank you,  Alysha Vargas" [place]

## 2023-10-19 NOTE — PROGRESS NOTES
Cardiology Consultation     Deana Leyden  [id number]  1975  [facility] [address] [address] [phone]    1. Shortness of breath  POCT ECG      2. Dyslipidemia        3. Lower extremity edema        4. 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, unspecified laterality (HCC)        5. Premature ventricular contractions        6. Coronary artery disease involving native coronary artery of native heart without angina pectoris          Chief Complaint   Patient presents with    New Patient Visit    Edema     LE bilat L> R mostly in ankles    Shortness of Breath     With exertion     HPI: Patient is here for cardiac evaluation of lower extremity edema, more located in the ankles along with shortness of breath with exertion. Going up flight of stairs or even walking from living room to other side of room she gets short of breath. Even taking a shower makes her short of breath. Symptoms started a few months ago and has been gradually getting worse and progressing. She does get lightheaded and she has been fatigued. Even talking a lot makes her breathing worse. No reported chest pain, palpitations, syncope, orthopnea, PND, or significant weight changes.       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 (HCC)    Sarcoidosis    Medical marijuana use    Personal history of kidney stones    Premature ventricular contractions    Trigger finger of left thumb    Pulmonary nodules    Current chronic use of systemic steroids    Vitamin D deficiency    Radicular pain of both lower extremities    Brisk deep tendon reflexes    Weakness of both hands    Radiculopathy, lumbar region    Axonal neuropathy    Shortness of breath    Lower extremity edema    Coronary artery disease involving native coronary artery of native heart without angina pectoris     Past Medical History:   Diagnosis Date    Allergic rhinitis     Anxiety     Arthritis     osteo    Asthma     borderline    Chronic pain disorder     right knee, lower back    CTS (carpal tunnel syndrome)     Depression     Diabetes mellitus (HCC)     Type 1 x 36yrs,,"on insulin pump for approx 17 yrs or so"    Difficulty walking     Disease of thyroid gland     hypothyroid    Dry eyes, bilateral     Fatigue     GERD (gastroesophageal reflux disease)     gastroparesis    History of UTI     Kidney stone     several, hx of stent    Motion sickness     Nausea     Neuropathy     Neuropathy     Neuropathy in diabetes (HCC)     PONV (postoperative nausea and vomiting)     "pt reports scopalamine patch does help"    Presence of surgical incision     "small biopsy site with steri and bandaid left outer thigh per pt report"    Pulmonary nodule     PVC (premature ventricular contraction)     Sarcoidosis     "is currently being tested to see if has it"    Status post right partial knee replacement     right    Tooth missing     Wears glasses      Social History     Socioeconomic History    Marital status: /Civil Union     Spouse name: Not on file    Number of children: Not on file    Years of education: Not on file    Highest education level: Not on file   Occupational History    Occupation: housewife   Tobacco Use    Smoking status: Former     Packs/day: 0.50     Years: 10.00     Total pack years: 5.00     Types: Cigarettes, E-Cigarettes     Start date: 13     Quit date: 2003     Years since quittin.3    Smokeless tobacco: Never    Tobacco comments:     Vaped for a couple of months during .    Substance and Sexual Activity    Alcohol use: Yes     Comment: On occasion, not daily    Drug use: Yes     Types: Marijuana     Comment: medical, usually edibles.     Sexual activity: Yes     Partners: Male     Birth control/protection: Post-menopausal     Comment: Hysterectomy   Other Topics Concern    Not on file   Social History Narrative    Not on file     Social Determinants of Health     Financial Resource Strain: Not on file   Food Insecurity: Not on file   Transportation Needs: Not on file   Physical Activity: Not on file   Stress: Not on file   Social Connections: Not on file   Intimate Partner Violence: Not on file   Housing Stability: Not on file      Family History   Problem Relation Age of Onset    Arthritis Mother     Asthma Mother     Heart disease Mother     Neuropathy Mother     COPD Father     Heart disease Father     Hyperlipidemia Father     Hypertension Father     Diabetes Father     Diabetes type II Father     Hypothyroidism Father     Diabetes type I Sister     Breast cancer Sister     Diabetes type I Sister     Maple syrup urine disease Son      Past Surgical History:   Procedure Laterality Date    APPENDECTOMY      CARPAL TUNNEL RELEASE Bilateral      SECTION      CYSTOSCOPY W/ LASER LITHOTRIPSY Left 2017    Procedure: CYSTOSCOPY, LEFT RETROGRADE PYELOGRAM LEFT URETEROSCOPY,  LEFT STENT INSERTION;  Surgeon: Sariah Guajardo MD;  Location: [facility];  Service: Urology    DILATION AND CURETTAGE OF UTERUS      FINGER SURGERY      HYSTERECTOMY      JOINT REPLACEMENT Right     partial knee    KNEE ARTHROSCOPY Right     x4    KNEE SURGERY      LA CYSTO/URETERO W/LITHOTRIPSY &INDWELL STENT INSRT Left 2017    Procedure: CYSTOSCOPY URETEROSCOPY,  RETROGRADE PYELOGRAM AND INSERTION STENT URETERAL;  Surgeon: Sariah Guajardo MD;  Location: [facility];  Service: Urology    REMOVAL URETERAL STENT  2017    SKIN BIOPSY Left     outer thigh    TRIGGER FINGER RELEASE      mul    URETERAL STENT PLACEMENT      VITRECTOMY Bilateral     right eye x2 and left x1    WISDOM TOOTH EXTRACTION         Current Outpatient Medications:     Accu-Chek FastClix Lancets MISC, Test BG up to 10x daily as directed, Disp: 900 each, Rfl: 1    Acetone, Urine, Test (Ketone Test) STRP, Use as needed to test urine if BG >300 or sick, Disp: 100 strip, Rfl: 1    amoxicillin (AMOXIL) 875 mg tablet, Take 875 mg by mouth 2 (two) times a day, Disp: , Rfl:     Antiseptic Products, Misc.  (IV Prep Wipes) 70 % PADS, Use 1 daily as directed, Disp: 100 each, Rfl: 1    Blood Glucose Calibration (Accu-Chek Guide Control) LIQD, Use as needed (to calibrate), Disp: 1 each, Rfl: 1    Blood Glucose Monitoring Suppl (Accu-Chek Guide Me) w/Device KIT, Test BG up to 10x daily as directed, Disp: 1 kit, Rfl: 0    budesonide-formoterol (SYMBICORT) 160-4.5 mcg/act inhaler, Inhale 2 puffs daily , Disp: , Rfl:     cetirizine (ZyrTEC) 10 mg tablet, Take 10 mg by mouth daily, Disp: , Rfl:     Continuous Blood Gluc Sensor (Dexcom G6 Sensor) MISC, USE DAILY AS DIRECTED FOR  CONTINUOUS GLUCOSE         MONITORING, CHANGE EVERY 10DAYS, Disp: 9 each, Rfl: 0    Continuous Blood Gluc Transmit (Dexcom G6 Transmitter) MISC, USE DAILY AS DIRECTED FOR  CONTINUOUS GLUCOSE         MONITORING, CHANGE EVERY 3 MONTHS, Disp: 1 each, Rfl: 1    Glucagon (Baqsimi Two Pack) 3 MG/DOSE POWD, Use as needed for severe hypoglycemia Disp 1 two pack, Disp: 1 each, Rfl: 1    glucose blood (Accu-Chek Guide) test strip, Test BG up to 10x daily as directed, Disp: 900 strip, Rfl: 1    Insulin Degludec 100 UNIT/ML SOLN, Use 30units subcut once daily in case of pump failure, Disp: 10 mL, Rfl: 2    Insulin Disposable Pump (Omnipod 5 G6 Intro, Gen 5,) KIT, USE DAILY AS DIRECTED FOR  INSULIN THERAPY, Disp: 1 kit, Rfl: 0    Insulin Disposable Pump (Omnipod 5 G6 Pod, Gen 5,) MISC, USE 1 POD EVERY 72 HOURS   SUBCUTANEOUSLY, Disp: 30 each, Rfl: 1    Insulin Disposable Pump (Omnipod 5 G6 Pod, Gen 5,) MISC, USE 1 POD EVERY 72 HOURS   SUBCUTANEOUSLY, Disp: 10 each, Rfl: 0    insulin glulisine (Apidra) 100 units/mL injection, USE WITH INSULIN PUMP 80-100UNITS PER DAY WITH PUMP, Disp: 90 mL, Rfl: 3    Insulin Syringe 27G X 1/2" 1 ML MISC, Use 3 (three) times a day Use in case of pump failure, Disp: 100 each, Rfl: 1    Isopropyl Alcohol (Alcohol Wipes) 70 % MISC, Use up to 5 daily as directed, Disp: 500 each, Rfl: 1    levalbuterol (XOPENEX HFA) 45 mcg/act inhaler, Inhale 1-2 puffs every 4 (four) hours as needed for wheezing, Disp: , Rfl:     levalbuterol (XOPENEX) 0.31 mg/3 mL nebulizer solution, Take 0.31 mg by nebulization every 4 (four) hours as needed for wheezing, Disp: , Rfl:     levothyroxine 100 mcg tablet, Take 1 tablet (100 mcg total) by mouth daily Take one pill by mouth Mon- Sat and 1/2 tab on Sunday, Disp: 90 tablet, Rfl: 2    metoprolol succinate (TOPROL-XL) 25 mg 24 hr tablet, Take 25 mg by mouth every evening, Disp: , Rfl:     montelukast (SINGULAIR) 10 mg tablet, Take 10 mg by mouth daily at bedtime, Disp: , Rfl:     predniSONE 10 mg tablet, Take 4 tablets (40 mg total) by mouth daily for 7 days, THEN 3 tablets (30 mg total) daily for 7 days, THEN 2 tablets (20 mg total) daily for 7 days, THEN 1 tablet (10 mg total) daily for 28 days. , Disp: 91 tablet, Rfl: 0    rosuvastatin (CRESTOR) 20 MG tablet, , Disp: , Rfl:     SERTRALINE HCL PO, Take 150 mg by mouth daily, Disp: , Rfl:   Allergies   Allergen Reactions    Insulin Aspart Abdominal Pain     Humulog and Novulog  Welts under skin and insulin resistance  Welts under skin and insulin resistance  Other reaction(s): Other  Welts under skin and insulin resistance    Morphine Other (See Comments)     Other reaction(s): Other  pain  Intensifies pain    Iodinated Contrast Media Hives     Pt can tolerate shellfish, pt can tolerate betadine.  Pt pt she is not allergic to IV contrast    Levofloxacin     Oxycodone GI Intolerance     Pt is able to tolerate hydrocodone     Vitals:    10/19/23 1526   BP: 108/66   BP Location: Left arm Patient Position: Sitting   Cuff Size: Standard   Pulse: 76   Weight: 92.1 kg (203 lb)   Height: 5' 3.5" (1.613 m)       Labs:  Appointment on 09/28/2023   Component Date Value    Hemoglobin A1C 09/28/2023 7.4 (H)     EAG 09/28/2023 166     TSH 3RD GENERATON 09/28/2023 2.263     Creatinine, Ur 09/28/2023 241. 3     Albumin,U,Random 09/28/2023 36.6 (H)     Albumin Creat Ratio 09/28/2023 15     WBC 09/28/2023 11.48 (H)     RBC 09/28/2023 4.88     Hemoglobin 09/28/2023 15.2     Hematocrit 09/28/2023 46.0     MCV 09/28/2023 94     MCH 09/28/2023 31.1     MCHC 09/28/2023 33.0     RDW 09/28/2023 13.6     MPV 09/28/2023 11.6     Platelets 75/81/4575 313     nRBC 09/28/2023 0     Neutrophils Relative 09/28/2023 62     Immat GRANS % 09/28/2023 1     Lymphocytes Relative 09/28/2023 16     Monocytes Relative 09/28/2023 6     Eosinophils Relative 09/28/2023 14 (H)     Basophils Relative 09/28/2023 1     Neutrophils Absolute 09/28/2023 7.02     Immature Grans Absolute 09/28/2023 0.06     Lymphocytes Absolute 09/28/2023 1.88     Monocytes Absolute 09/28/2023 0.74     Eosinophils Absolute 09/28/2023 1.65 (H)     Basophils Absolute 09/28/2023 0.13 (H)     Sed Rate 09/28/2023 38 (H)     CRP 09/28/2023 14.7 (H)     Sodium 09/28/2023 141     Potassium 09/28/2023 3.6     Chloride 09/28/2023 104     CO2 09/28/2023 29     ANION GAP 09/28/2023 8     BUN 09/28/2023 14     Creatinine 09/28/2023 0.94     Glucose, Fasting 09/28/2023 106 (H)     Calcium 09/28/2023 9.5     AST 09/28/2023 22     ALT 09/28/2023 17     Alkaline Phosphatase 09/28/2023 58     Total Protein 09/28/2023 7.3     Albumin 09/28/2023 4.2     Total Bilirubin 09/28/2023 1.04 (H)     eGFR 09/28/2023 72     A. ALTERNATA 09/28/2023 <0.10     A. FUMIGATUS 09/28/2023 <0.10     [place] Grass 09/28/2023 <0.10     [place]  09/28/2023 <0.10     Cat Epithellium-Dander 09/28/2023 2.86 (H)     C.HERBARUM 09/28/2023 <0.10     Cockroach 09/28/2023 <0.10     Common Silver Lucia Parisian 09/28/2023 <0.10     Randolph 09/28/2023 <0.10     D. farinae 09/28/2023 <0.10     D. pteronyssinus 09/28/2023 <0.10     Dog Dander 09/28/2023 0.77 (H)     Elm IgE 09/28/2023 <0.10     [address] 09/28/2023 <0.10     Mugwort 09/28/2023 <0.10     [place] Tree 09/28/2023 <0.10     Oak 09/28/2023 <0.10     P.CHRYSOGENUM 09/28/2023 <0.10     Rough Pigweed  IgE 09/28/2023 <0.10     Common Ragweed 09/28/2023 <0.10     Sheep Sorrel IgE 09/28/2023 <0.10     [place] Tree 09/28/2023 <0.10     Saad Grass 09/28/2023 <0.10     [address] Tree 09/28/2023 <0.10     White Nikolas Tree 09/28/2023 <0.10     IgE 09/28/2023 166 (H)     MOUSE URINE 09/28/2023 <0.10    Orders Only on 08/10/2023   Component Date Value    Right Eye Diabetic Retin* 08/10/2023 Positive     Left Eye Diabetic Retino* 08/10/2023 Positive    Office Visit on 05/31/2023   Component Date Value    Hemoglobin A1C 05/31/2023 7.5 (A)      No results found for: "CHOL", "TRIG", "HDL", "LDLDIRECT"  Imaging: CT chest without contrast    Result Date: 10/6/2023  Narrative: CT CHEST WITHOUT IV CONTRAST INDICATION:   D86.9: Sarcoidosis, unspecified. COMPARISON: Chest radiograph 8/8/2023. TECHNIQUE: CT examination of the chest was performed without intravenous contrast.  Axial, sagittal, and coronal 2D reformatted images were created from the source data and submitted for interpretation. Radiation dose length product (DLP) for this visit:  362 mGy-cm . This examination, like all CT scans performed in the [facility], was performed utilizing techniques to minimize radiation dose exposure, including the use of iterative reconstruction and automated exposure control. FINDINGS: LARGE AIRWAYS: Large airways are clear with no tracheal or endobronchial lesion. LUNGS: No consolidation. No edema. Middle lobe 5 mm typical perifissural nodule (4/58) in keeping with intrapulmonary lymph node.  There are very few scattered sub-4 mm pulmonary nodules in both lungs, including (series 4): - Left upper lobe 2-3 mm nodules (2x) on image 55. - Left lower lobe 3 mm nodule on image 61. - Left upper lobe lingular 2-3 mm nodule on image 74. PLEURA: No pneumothorax. No pleural effusion. HEART: Normal cardiac size and morphology. minimal LAD coronary artery calcification. No pericardial effusion or thickening. VESSELS: Normal caliber thoracic aorta with no detectable atherosclerotic plaque. Common origin of the brachiocephalic and left common carotid arteries, a normal variant. Normal caliber main pulmonary artery. MEDIASTINUM AND LISETTE:  Within normal limits. No lymphadenopathy. CHEST WALL AND LOWER NECK:   Within normal limits. VISUALIZED STRUCTURES IN THE UPPER ABDOMEN: Within normal limits. BONES: Mild multilevel degenerative changes in the spine. Vertebral body height is maintained. No acute fracture or destructive osseous lesion. Impression: Very few scattered sub-4 mm pulmonary nodules which are likely of infectious or inflammatory etiology. No lymphadenopathy in the chest. Workstation performed: [id number]       Review of Systems:  Review of Systems   Constitutional:  Positive for fatigue. Negative for activity change, appetite change, chills, diaphoresis and unexpected weight change. HENT:  Negative for hearing loss, nosebleeds and sore throat. Eyes:  Negative for photophobia and visual disturbance. Respiratory:  Positive for shortness of breath. Negative for cough, chest tightness and wheezing. Cardiovascular:  Positive for leg swelling. Negative for chest pain and palpitations. Gastrointestinal:  Negative for abdominal pain, diarrhea, nausea and vomiting. Endocrine: Negative for polyuria. Genitourinary:  Negative for dysuria, frequency and hematuria. Musculoskeletal:  Negative for arthralgias, back pain, gait problem and neck pain. Skin:  Negative for pallor and rash. Neurological:  Negative for dizziness, syncope and headaches.    Hematological:  Does not bruise/bleed easily. Psychiatric/Behavioral:  Negative for behavioral problems and confusion. Physical Exam:  Physical Exam  Vitals reviewed. Constitutional:       Appearance: She is well-developed. She is not diaphoretic. HENT:      Head: Normocephalic and atraumatic. Nose: Nose normal.   Eyes:      General: No scleral icterus. Pupils: Pupils are equal, round, and reactive to light. Neck:      Vascular: No JVD. Cardiovascular:      Rate and Rhythm: Normal rate and regular rhythm. Heart sounds: Normal heart sounds. No murmur heard. No friction rub. No gallop. Pulmonary:      Effort: Pulmonary effort is normal. No respiratory distress. Breath sounds: Normal breath sounds. No wheezing or rales. Abdominal:      General: Bowel sounds are normal. There is no distension. Palpations: Abdomen is soft. Tenderness: There is no abdominal tenderness. Musculoskeletal:         General: No deformity. Normal range of motion. Cervical back: Normal range of motion and neck supple. Right lower leg: Edema present. Left lower leg: Edema present. Skin:     General: Skin is warm and dry. Findings: No rash. Neurological:      Mental Status: She is alert and oriented to person, place, and time. Cranial Nerves: No cranial nerve deficit. Psychiatric:         Behavior: Behavior normal.       Blood pressure 108/66, pulse 76, height 5' 3.5" (1.613 m), weight 92.1 kg (203 lb), not currently breastfeeding. EKG:  Normal sinus rhythm  Normal ECG    Discussion/Summary:  Shortness of breath and lower extremity edema: unclear etiology. Has risk factors of type 1 diabetes mellitus, dyslipidemia, family history of heart disease, former smoker, with minimal LAD coronary calcification seen on CT chest in September 2023.   Her family history is of both her parents with LAD lesions that caused MIs, so we will proceed with cardiac cath for more definitive diagnosis for ischemic disease and high pre-test probability. She had an echocardiogram in August 2023 revealing no significant structural heart disease. Hyperlipidemia: Continued on statin. LDL well controlled at 67 in September 2022. PVCs: Continued on metoprolol and symptoms seem to be controlled.

## 2023-10-19 NOTE — TELEPHONE ENCOUNTER
----- Message from Jayant Almaraz MD sent at 10/19/2023  4:14 PM EDT -----  Regarding: [facility]  Please schedule patient for [facility] for high pre-test probability for CAD, CAD seen on CT chest, and typical angina

## 2023-10-19 NOTE — LETTER
October 19, 2023     Bettina Kumar, [address] [address]    Patient: Charles Chung   YOB: 1975   Date of Visit: 10/19/2023       Dear Dr. Christine West: Thank you for referring Charles Chung to me for evaluation. Below are my notes for this consultation. If you have questions, please do not hesitate to call me. I look forward to following your patient along with you. Sincerely,        Madisyn Sinha MD        CC: No Recipients    Madisyn Sinha MD  10/19/2023  3:46 PM  Incomplete                                             Cardiology Consultation     Charles Chung  [id number]  1975  [facility] [address] [address] [phone]    1. Shortness of breath  POCT ECG        Chief Complaint   Patient presents with   • New Patient Visit   • Edema     LE bilat L> R mostly in ankles   • Shortness of Breath     With exertion     HPI: Patient is here for cardiac evaluation of lower extremity edema, more located in the ankles along with shortness of breath with exertion. No reported chest pain, palpitations, lightheadedness, syncope, orthopnea, PND, or significant weight changes. Patient remains active without any increased fatigue out of the ordinary.       Patient Active Problem List   Diagnosis   • Left flank pain   • Diabetic polyneuropathy associated with type 1 diabetes mellitus (HCC)   • Dyslipidemia   • Hypothyroidism   • Insulin pump status   • 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 (HCC)   • Sarcoidosis   • Medical marijuana use   • Personal history of kidney stones   • Premature ventricular contractions   • Trigger finger of left thumb   • Pulmonary nodules   • Current chronic use of systemic steroids   • Vitamin D deficiency   • Radicular pain of both lower extremities   • Brisk deep tendon reflexes   • Weakness of both hands   • Radiculopathy, lumbar region   • Axonal neuropathy     Past Medical History:   Diagnosis Date   • Allergic rhinitis    • Anxiety    • Arthritis     osteo   • Asthma     borderline   • Chronic pain disorder     right knee, lower back   • CTS (carpal tunnel syndrome)    • Depression    • Diabetes mellitus (HCC)     Type 1 x 36yrs,,"on insulin pump for approx 17 yrs or so"   • Difficulty walking    • Disease of thyroid gland     hypothyroid   • Dry eyes, bilateral    • Fatigue    • GERD (gastroesophageal reflux disease)     gastroparesis   • History of UTI    • Kidney stone     several, hx of stent   • Motion sickness    • Nausea    • Neuropathy    • Neuropathy    • Neuropathy in diabetes (HCC)    • PONV (postoperative nausea and vomiting)     "pt reports scopalamine patch does help"   • Presence of surgical incision     "small biopsy site with steri and bandaid left outer thigh per pt report"   • Pulmonary nodule    • PVC (premature ventricular contraction)    • Sarcoidosis     "is currently being tested to see if has it"   • Status post right partial knee replacement     right   • Tooth missing    • Wears glasses      Social History     Socioeconomic History   • Marital status: /Civil Union     Spouse name: Not on file   • Number of children: Not on file   • Years of education: Not on file   • Highest education level: Not on file   Occupational History   • Occupation: housewife   Tobacco Use   • Smoking status: Former     Packs/day: 0.50     Years: 10.00     Total pack years: 5.00     Types: Cigarettes, E-Cigarettes     Start date:      Quit date: 2003     Years since quittin.3   • Smokeless tobacco: Never   • Tobacco comments:     Vaped for a couple of months during . Substance and Sexual Activity   • Alcohol use: Yes     Comment: On occasion, not daily   • Drug use: Yes     Types: Marijuana     Comment: medical, usually edibles.    • Sexual activity: Yes     Partners: Male Birth control/protection: Post-menopausal     Comment: Hysterectomy   Other Topics Concern   • Not on file   Social History Narrative   • Not on file     Social Determinants of Health     Financial Resource Strain: Not on file   Food Insecurity: Not on file   Transportation Needs: Not on file   Physical Activity: Not on file   Stress: Not on file   Social Connections: Not on file   Intimate Partner Violence: Not on file   Housing Stability: Not on file      Family History   Problem Relation Age of Onset   • Arthritis Mother    • Asthma Mother    • Heart disease Mother    • Neuropathy Mother    • COPD Father    • Heart disease Father    • Hyperlipidemia Father    • Hypertension Father    • Diabetes Father    • Diabetes type II Father    • Hypothyroidism Father    • Diabetes type I Sister    • Breast cancer Sister    • Diabetes type I Sister    • Maple syrup urine disease Son      Past Surgical History:   Procedure Laterality Date   • APPENDECTOMY     • CARPAL TUNNEL RELEASE Bilateral    •  SECTION     • CYSTOSCOPY W/ LASER LITHOTRIPSY Left 2017    Procedure: CYSTOSCOPY, LEFT RETROGRADE PYELOGRAM LEFT URETEROSCOPY,  LEFT STENT INSERTION;  Surgeon: Francisco Rodarte MD;  Location: [facility];  Service: Urology   • DILATION AND CURETTAGE OF UTERUS     • FINGER SURGERY     • HYSTERECTOMY     • JOINT REPLACEMENT Right     partial knee   • KNEE ARTHROSCOPY Right     x4   • KNEE SURGERY     • LA CYSTO/URETERO W/LITHOTRIPSY &INDWELL STENT INSRT Left 2017    Procedure: CYSTOSCOPY URETEROSCOPY,  RETROGRADE PYELOGRAM AND INSERTION STENT URETERAL;  Surgeon: Francisco Rodarte MD;  Location: [facility];  Service: Urology   • REMOVAL URETERAL STENT  2017   • SKIN BIOPSY Left     outer thigh   • TRIGGER FINGER RELEASE      mul   • URETERAL STENT PLACEMENT     • VITRECTOMY Bilateral     right eye x2 and left x1   • WISDOM TOOTH EXTRACTION         Current Outpatient Medications:   •  Accu-Chek FastClix Lancets MISC, Test BG up to 10x daily as directed, Disp: 900 each, Rfl: 1  •  Acetone, Urine, Test (Ketone Test) STRP, Use as needed to test urine if BG >300 or sick, Disp: 100 strip, Rfl: 1  •  amoxicillin (AMOXIL) 875 mg tablet, Take 875 mg by mouth 2 (two) times a day, Disp: , Rfl:   •  Antiseptic Products, Misc.  (IV Prep Wipes) 70 % PADS, Use 1 daily as directed, Disp: 100 each, Rfl: 1  •  Blood Glucose Calibration (Accu-Chek Guide Control) LIQD, Use as needed (to calibrate), Disp: 1 each, Rfl: 1  •  Blood Glucose Monitoring Suppl (Accu-Chek Guide Me) w/Device KIT, Test BG up to 10x daily as directed, Disp: 1 kit, Rfl: 0  •  budesonide-formoterol (SYMBICORT) 160-4.5 mcg/act inhaler, Inhale 2 puffs daily , Disp: , Rfl:   •  cetirizine (ZyrTEC) 10 mg tablet, Take 10 mg by mouth daily, Disp: , Rfl:   •  Continuous Blood Gluc Sensor (Dexcom G6 Sensor) MISC, USE DAILY AS DIRECTED FOR  CONTINUOUS GLUCOSE         MONITORING, CHANGE EVERY 10DAYS, Disp: 9 each, Rfl: 0  •  Continuous Blood Gluc Transmit (Dexcom G6 Transmitter) MISC, USE DAILY AS DIRECTED FOR  CONTINUOUS GLUCOSE         MONITORING, CHANGE EVERY 3 MONTHS, Disp: 1 each, Rfl: 1  •  Glucagon (Baqsimi Two Pack) 3 MG/DOSE POWD, Use as needed for severe hypoglycemia Disp 1 two pack, Disp: 1 each, Rfl: 1  •  glucose blood (Accu-Chek Guide) test strip, Test BG up to 10x daily as directed, Disp: 900 strip, Rfl: 1  •  Insulin Degludec 100 UNIT/ML SOLN, Use 30units subcut once daily in case of pump failure, Disp: 10 mL, Rfl: 2  •  Insulin Disposable Pump (Omnipod 5 G6 Intro, Gen 5,) KIT, USE DAILY AS DIRECTED FOR  INSULIN THERAPY, Disp: 1 kit, Rfl: 0  •  Insulin Disposable Pump (Omnipod 5 G6 Pod, Gen 5,) MISC, USE 1 POD EVERY 72 HOURS   SUBCUTANEOUSLY, Disp: 30 each, Rfl: 1  •  Insulin Disposable Pump (Omnipod 5 G6 Pod, Gen 5,) MISC, USE 1 POD EVERY 72 HOURS   SUBCUTANEOUSLY, Disp: 10 each, Rfl: 0  •  insulin glulisine (Apidra) 100 units/mL injection, USE WITH INSULIN PUMP 80-100UNITS PER DAY WITH PUMP, Disp: 90 mL, Rfl: 3  •  Insulin Syringe 27G X 1/2" 1 ML MISC, Use 3 (three) times a day Use in case of pump failure, Disp: 100 each, Rfl: 1  •  Isopropyl Alcohol (Alcohol Wipes) 70 % MISC, Use up to 5 daily as directed, Disp: 500 each, Rfl: 1  •  levalbuterol (XOPENEX HFA) 45 mcg/act inhaler, Inhale 1-2 puffs every 4 (four) hours as needed for wheezing, Disp: , Rfl:   •  levalbuterol (XOPENEX) 0.31 mg/3 mL nebulizer solution, Take 0.31 mg by nebulization every 4 (four) hours as needed for wheezing, Disp: , Rfl:   •  levothyroxine 100 mcg tablet, Take 1 tablet (100 mcg total) by mouth daily Take one pill by mouth Mon- Sat and 1/2 tab on Sunday, Disp: 90 tablet, Rfl: 2  •  metoprolol succinate (TOPROL-XL) 25 mg 24 hr tablet, Take 25 mg by mouth every evening, Disp: , Rfl:   •  montelukast (SINGULAIR) 10 mg tablet, Take 10 mg by mouth daily at bedtime, Disp: , Rfl:   •  predniSONE 10 mg tablet, Take 4 tablets (40 mg total) by mouth daily for 7 days, THEN 3 tablets (30 mg total) daily for 7 days, THEN 2 tablets (20 mg total) daily for 7 days, THEN 1 tablet (10 mg total) daily for 28 days. , Disp: 91 tablet, Rfl: 0  •  rosuvastatin (CRESTOR) 20 MG tablet, , Disp: , Rfl:   •  SERTRALINE HCL PO, Take 150 mg by mouth daily, Disp: , Rfl:   Allergies   Allergen Reactions   • Insulin Aspart Abdominal Pain     Humulog and Novulog  Welts under skin and insulin resistance  Welts under skin and insulin resistance  Other reaction(s): Other  Welts under skin and insulin resistance   • Morphine Other (See Comments)     Other reaction(s): Other  pain  Intensifies pain   • Iodinated Contrast Media Hives     Pt can tolerate shellfish, pt can tolerate betadine.  Pt pt she is not allergic to IV contrast   • Levofloxacin    • Oxycodone GI Intolerance     Pt is able to tolerate hydrocodone     Vitals:    10/19/23 1526   BP: 108/66   BP Location: Left arm   Patient Position: Sitting   Cuff Size: Standard   Pulse: 76   Weight: 92.1 kg (203 lb)   Height: 5' 3.5" (1.613 m)       Labs:  Appointment on 09/28/2023   Component Date Value   • Hemoglobin A1C 09/28/2023 7.4 (H)    • EAG 09/28/2023 166    • TSH 3RD GENERATON 09/28/2023 2.263    • Creatinine, Ur 09/28/2023 241. 3    • Albumin,U,Random 09/28/2023 36.6 (H)    • Albumin Creat Ratio 09/28/2023 15    • WBC 09/28/2023 11.48 (H)    • RBC 09/28/2023 4.88    • Hemoglobin 09/28/2023 15.2    • Hematocrit 09/28/2023 46.0    • MCV 09/28/2023 94    • MCH 09/28/2023 31.1    • MCHC 09/28/2023 33.0    • RDW 09/28/2023 13.6    • MPV 09/28/2023 11.6    • Platelets 26/53/9205 313    • nRBC 09/28/2023 0    • Neutrophils Relative 09/28/2023 62    • Immat GRANS % 09/28/2023 1    • Lymphocytes Relative 09/28/2023 16    • Monocytes Relative 09/28/2023 6    • Eosinophils Relative 09/28/2023 14 (H)    • Basophils Relative 09/28/2023 1    • Neutrophils Absolute 09/28/2023 7.02    • Immature Grans Absolute 09/28/2023 0.06    • Lymphocytes Absolute 09/28/2023 1.88    • Monocytes Absolute 09/28/2023 0.74    • Eosinophils Absolute 09/28/2023 1.65 (H)    • Basophils Absolute 09/28/2023 0.13 (H)    • Sed Rate 09/28/2023 38 (H)    • CRP 09/28/2023 14.7 (H)    • Sodium 09/28/2023 141    • Potassium 09/28/2023 3.6    • Chloride 09/28/2023 104    • CO2 09/28/2023 29    • ANION GAP 09/28/2023 8    • BUN 09/28/2023 14    • Creatinine 09/28/2023 0.94    • Glucose, Fasting 09/28/2023 106 (H)    • Calcium 09/28/2023 9.5    • AST 09/28/2023 22    • ALT 09/28/2023 17    • Alkaline Phosphatase 09/28/2023 58    • Total Protein 09/28/2023 7.3    • Albumin 09/28/2023 4.2    • Total Bilirubin 09/28/2023 1.04 (H)    • eGFR 09/28/2023 72    • A. ALTERNATA 09/28/2023 <0.10    • A. FUMIGATUS 09/28/2023 <0.10    • [place] Grass 09/28/2023 <0.10    • Banner  09/28/2023 <0.10    • Cat Epithellium-Dander 09/28/2023 2.86 (H)    • C. HERBARUM 09/28/2023 <0.10    • Cockroach 09/28/2023 <0.10    • Common [place] 09/28/2023 <0.10    • [place] 09/28/2023 <0.10    • D. farinae 09/28/2023 <0.10    • D. pteronyssinus 09/28/2023 <0.10    • Dog Dander 09/28/2023 0.77 (H)    • Elm IgE 09/28/2023 <0.10    • [address] 09/28/2023 <0.10    • Mugwort 09/28/2023 <0.10    • [place] Tree 09/28/2023 <0.10    • Oak 09/28/2023 <0.10    • P.CHRYSOGENUM 09/28/2023 <0.10    • Rough Pigweed  IgE 09/28/2023 <0.10    • Common Ragweed 09/28/2023 <0.10    • Sheep [place] IgE 09/28/2023 <0.10    • [place] Tree 09/28/2023 <0.10    • Saad Grass 09/28/2023 <0.10    • [address] Tree 09/28/2023 <0.10    • White [place] Tree 09/28/2023 <0.10    • IgE 09/28/2023 166 (H)    • MOUSE URINE 09/28/2023 <0.10    Orders Only on 08/10/2023   Component Date Value   • Right Eye Diabetic Retin* 08/10/2023 Positive    • Left Eye Diabetic Retino* 08/10/2023 Positive    Office Visit on 05/31/2023   Component Date Value   • Hemoglobin A1C 05/31/2023 7.5 (A)      No results found for: "CHOL", "TRIG", "HDL", "LDLDIRECT"  Imaging: CT chest without contrast    Result Date: 10/6/2023  Narrative: CT CHEST WITHOUT IV CONTRAST INDICATION:   D86.9: Sarcoidosis, unspecified. COMPARISON: Chest radiograph 8/8/2023. TECHNIQUE: CT examination of the chest was performed without intravenous contrast.  Axial, sagittal, and coronal 2D reformatted images were created from the source data and submitted for interpretation. Radiation dose length product (DLP) for this visit:  362 mGy-cm . This examination, like all CT scans performed in the [facility], was performed utilizing techniques to minimize radiation dose exposure, including the use of iterative reconstruction and automated exposure control. FINDINGS: LARGE AIRWAYS: Large airways are clear with no tracheal or endobronchial lesion. LUNGS: No consolidation. No edema. Middle lobe 5 mm typical perifissural nodule (4/58) in keeping with intrapulmonary lymph node.  There are very few scattered sub-4 mm pulmonary nodules in both lungs, including (series 4): - Left upper lobe 2-3 mm nodules (2x) on image 55. - Left lower lobe 3 mm nodule on image 61. - Left upper lobe lingular 2-3 mm nodule on image 74. PLEURA: No pneumothorax. No pleural effusion. HEART: Normal cardiac size and morphology. minimal LAD coronary artery calcification. No pericardial effusion or thickening. VESSELS: Normal caliber thoracic aorta with no detectable atherosclerotic plaque. Common origin of the brachiocephalic and left common carotid arteries, a normal variant. Normal caliber main pulmonary artery. MEDIASTINUM AND LISETTE:  Within normal limits. No lymphadenopathy. CHEST WALL AND LOWER NECK:   Within normal limits. VISUALIZED STRUCTURES IN THE UPPER ABDOMEN: Within normal limits. BONES: Mild multilevel degenerative changes in the spine. Vertebral body height is maintained. No acute fracture or destructive osseous lesion. Impression: Very few scattered sub-4 mm pulmonary nodules which are likely of infectious or inflammatory etiology. No lymphadenopathy in the chest. Workstation performed: [id number]       Review of Systems:  Review of Systems   Constitutional:  Negative for activity change, appetite change, chills, diaphoresis, fatigue and unexpected weight change. HENT:  Negative for hearing loss, nosebleeds and sore throat. Eyes:  Negative for photophobia and visual disturbance. Respiratory:  Positive for shortness of breath. Negative for cough, chest tightness and wheezing. Cardiovascular:  Positive for leg swelling. Negative for chest pain and palpitations. Gastrointestinal:  Negative for abdominal pain, diarrhea, nausea and vomiting. Endocrine: Negative for polyuria. Genitourinary:  Negative for dysuria, frequency and hematuria. Musculoskeletal:  Negative for arthralgias, back pain, gait problem and neck pain. Skin:  Negative for pallor and rash. Neurological:  Negative for dizziness, syncope and headaches.    Hematological: Does not bruise/bleed easily. Psychiatric/Behavioral:  Negative for behavioral problems and confusion. Physical Exam:  Physical Exam  Vitals reviewed. Constitutional:       Appearance: She is well-developed. She is not diaphoretic. HENT:      Head: Normocephalic and atraumatic. Nose: Nose normal.   Eyes:      General: No scleral icterus. Pupils: Pupils are equal, round, and reactive to light. Neck:      Vascular: No JVD. Cardiovascular:      Rate and Rhythm: Normal rate and regular rhythm. Heart sounds: Normal heart sounds. No murmur heard. No friction rub. No gallop. Pulmonary:      Effort: Pulmonary effort is normal. No respiratory distress. Breath sounds: Normal breath sounds. No wheezing or rales. Abdominal:      General: Bowel sounds are normal. There is no distension. Palpations: Abdomen is soft. Tenderness: There is no abdominal tenderness. Musculoskeletal:         General: No deformity. Normal range of motion. Cervical back: Normal range of motion and neck supple. Skin:     General: Skin is warm and dry. Findings: No rash. Neurological:      Mental Status: She is alert and oriented to person, place, and time. Cranial Nerves: No cranial nerve deficit. Psychiatric:         Behavior: Behavior normal.       Blood pressure 108/66, pulse 76, height 5' 3.5" (1.613 m), weight 92.1 kg (203 lb), not currently breastfeeding. EKG:  Normal sinus rhythm  Normal ECG    Discussion/Summary:  Shortness of breath of breath and lower extremity edema: unclear etiology. Has risk factors of type 1 diabetes mellitus, dyslipidemia, family history of heart disease, former smoker, with minimal LAD coronary calcification seen on CT chest in September 2023. Will screen for cardiac conditions with a stress test to rule out ischemic etiology. She had an echocardiogram in August 2023 revealing no significant structural heart disease.     Hyperlipidemia: Continued on statin. LDL well controlled at 67 in September 2022. PVCs: Continued on metoprolol.

## 2023-10-20 ENCOUNTER — TELEMEDICINE (OUTPATIENT)
Dept: ENDOCRINOLOGY | Facility: CLINIC | Age: 48
End: 2023-10-20

## 2023-10-20 VITALS
WEIGHT: 203 LBS | HEART RATE: 76 BPM | HEIGHT: 64 IN | DIASTOLIC BLOOD PRESSURE: 66 MMHG | BODY MASS INDEX: 34.66 KG/M2 | SYSTOLIC BLOOD PRESSURE: 108 MMHG

## 2023-10-20 DIAGNOSIS — E03.9 ACQUIRED HYPOTHYROIDISM: ICD-10-CM

## 2023-10-20 DIAGNOSIS — E10.3593 TYPE 1 DIABETES MELLITUS WITH PROLIFERATIVE RETINOPATHY OF BOTH EYES WITHOUT MACULAR EDEMA (HCC): Primary | ICD-10-CM

## 2023-10-20 RX ORDER — INSULIN PMP CART,AUT,G6/7,CNTR
EACH SUBCUTANEOUS
Qty: 50 EACH | Refills: 0 | Status: SHIPPED | OUTPATIENT
Start: 2023-10-20

## 2023-10-20 NOTE — PROGRESS NOTES
Established Patient Progress Note      No chief complaint on file. Impression & Plan:    Problem List Items Addressed This Visit    None      History of Present Illness: Ranjan Gonzalez is a 53 y.o. female with type 1 diabetes with long term use of insulin since age 12. Last seen in the office 5/31/23. Reports complications of retinopathy, neuropathy and gastroparesis. Last A1C was ***. Denies recent illness or hospitalizations. Denies recent severe hypoglycemic or severe hyperglycemic episodes. Denies any issues with her current regimen. Home glucose monitoring: are performed regularly with CGM. Patient is on a Omnipod 5 pump prescribed by our office. She has been on a pump for many years but started this pump a few months ago. Previously used [employer]. She denies any malfunctioning of the pump. Current Insulin pump settings:  Basal rate:12AM 1.4, 2AM 1.1, 7A 01.0, 9AM 1.15, 12PM 1.25, 4:30PM 1.1, 7PM 1.0  Insulin to carb ratio:12AM 7.5, 12p 6.5. 430p 7  Insulin sensitivity factor:12AM 40, 3AM 30 12PM 40 5pm 25  BG target:110  Active Insulin time: 3 hours  Type of insulin:  Apidra  Daily dose 66.6units; basal 38 units       Backup Plan: Has tresiba  Aware that in case of malfunctioning of the pump or unexplained hyperglycemia to use basal and bolus therapy as backup which is prescribed to the patient. Also notified patient to call clinic and/or pump company if any issues or go to emergency department if signs/symptoms of DKA. Ranjan Gonzalez   Device used: Dexcom G6  Home use   Indication: Type 1 Diabetes  More than 72 hours of data was reviewed. Report to be scanned to chart.    Date Range: ***  Analysis of data:   Average Glucose: ***  Coefficient of Variation: ***   SD : ***   Time in Target Range: ***   Time Above Range: ***   Time Below Range: ***   Interpretation of data:       Current regimen:   ***    Last Eye Exam: ***  Last Foot Exam: ***    Has hypertension: Taking metoprolol  Has hyperlipidemia: Taking rosuvastatin     Thyroid disorders: Taking levothyroxine 100 mcg Monday-Saturday and 1/2 Sunday   Vitamin D Deficiency: Taking ***    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 (HCC)    Sarcoidosis    Medical marijuana use    Personal history of kidney stones    Premature ventricular contractions    Trigger finger of left thumb    Pulmonary nodules    Current chronic use of systemic steroids    Vitamin D deficiency    Radicular pain of both lower extremities    Brisk deep tendon reflexes    Weakness of both hands    Radiculopathy, lumbar region    Axonal neuropathy    Shortness of breath    Lower extremity edema    Coronary artery disease involving native coronary artery of native heart without angina pectoris      Past Medical History:   Diagnosis Date    Allergic rhinitis     Anxiety     Arthritis     osteo    Asthma     borderline    Chronic pain disorder     right knee, lower back    CTS (carpal tunnel syndrome)     Depression     Diabetes mellitus (HCC)     Type 1 x 36yrs,,"on insulin pump for approx 17 yrs or so"    Difficulty walking     Disease of thyroid gland     hypothyroid    Dry eyes, bilateral     Fatigue     GERD (gastroesophageal reflux disease)     gastroparesis    History of UTI     Kidney stone     several, hx of stent    Motion sickness     Nausea     Neuropathy     Neuropathy     Neuropathy in diabetes (HCC)     PONV (postoperative nausea and vomiting)     "pt reports scopalamine patch does help"    Presence of surgical incision     "small biopsy site with steri and bandaid left outer thigh per pt report"    Pulmonary nodule     PVC (premature ventricular contraction)     Sarcoidosis     "is currently being tested to see if has it"    Status post right partial knee replacement     right    Tooth missing     Wears glasses       Past Surgical History:   Procedure Laterality Date    APPENDECTOMY      CARPAL TUNNEL RELEASE Bilateral      SECTION      CYSTOSCOPY W/ LASER LITHOTRIPSY Left 2017    Procedure: CYSTOSCOPY, LEFT RETROGRADE PYELOGRAM LEFT URETEROSCOPY,  LEFT STENT INSERTION;  Surgeon: Francisco Rodarte MD;  Location: [facility];  Service: Urology    DILATION AND CURETTAGE OF UTERUS      FINGER SURGERY      HYSTERECTOMY      JOINT REPLACEMENT Right     partial knee    KNEE ARTHROSCOPY Right     x4    KNEE SURGERY      IL CYSTO/URETERO W/LITHOTRIPSY &INDWELL STENT INSRT Left 2017    Procedure: CYSTOSCOPY URETEROSCOPY,  RETROGRADE PYELOGRAM AND INSERTION STENT URETERAL;  Surgeon: Francisco Rodarte MD;  Location: [facility];  Service: Urology    REMOVAL URETERAL STENT  2017    SKIN BIOPSY Left     outer thigh    TRIGGER FINGER RELEASE      mul    URETERAL STENT PLACEMENT      VITRECTOMY Bilateral     right eye x2 and left x1    WISDOM TOOTH EXTRACTION        Family History   Problem Relation Age of Onset    Arthritis Mother     Asthma Mother     Heart disease Mother     Neuropathy Mother     COPD Father     Heart disease Father     Hyperlipidemia Father     Hypertension Father     Diabetes Father     Diabetes type II Father     Hypothyroidism Father     Diabetes type I Sister     Breast cancer Sister     Diabetes type I Sister     Maple syrup urine disease Son      Social History     Tobacco Use    Smoking status: Former     Packs/day: 0.50     Years: 10.00     Total pack years: 5.00     Types: Cigarettes, E-Cigarettes     Start date:      Quit date: 2003     Years since quittin.3    Smokeless tobacco: Never    Tobacco comments:     Vaped for a couple of months during .    Substance Use Topics    Alcohol use: Yes     Comment: On occasion, not daily     Allergies   Allergen Reactions    Insulin Aspart Abdominal Pain Humulog and Novulog  Welts under skin and insulin resistance  Welts under skin and insulin resistance  Other reaction(s): Other  Welts under skin and insulin resistance    Morphine Other (See Comments)     Other reaction(s): Other  pain  Intensifies pain    Iodinated Contrast Media Hives     Pt can tolerate shellfish, pt can tolerate betadine. Pt pt she is not allergic to IV contrast    Levofloxacin     Oxycodone GI Intolerance     Pt is able to tolerate hydrocodone         Current Outpatient Medications:     Accu-Chek FastClix Lancets MISC, Test BG up to 10x daily as directed, Disp: 900 each, Rfl: 1    Acetone, Urine, Test (Ketone Test) STRP, Use as needed to test urine if BG >300 or sick, Disp: 100 strip, Rfl: 1    amoxicillin (AMOXIL) 875 mg tablet, Take 875 mg by mouth 2 (two) times a day, Disp: , Rfl:     Antiseptic Products, Misc.  (IV Prep Wipes) 70 % PADS, Use 1 daily as directed, Disp: 100 each, Rfl: 1    Blood Glucose Calibration (Accu-Chek Guide Control) LIQD, Use as needed (to calibrate), Disp: 1 each, Rfl: 1    Blood Glucose Monitoring Suppl (Accu-Chek Guide Me) w/Device KIT, Test BG up to 10x daily as directed, Disp: 1 kit, Rfl: 0    budesonide-formoterol (SYMBICORT) 160-4.5 mcg/act inhaler, Inhale 2 puffs daily , Disp: , Rfl:     cetirizine (ZyrTEC) 10 mg tablet, Take 10 mg by mouth daily, Disp: , Rfl:     Continuous Blood Gluc Sensor (Dexcom G6 Sensor) MISC, USE DAILY AS DIRECTED FOR  CONTINUOUS GLUCOSE         MONITORING, CHANGE EVERY 10DAYS, Disp: 9 each, Rfl: 0    Continuous Blood Gluc Transmit (Dexcom G6 Transmitter) MISC, USE DAILY AS DIRECTED FOR  CONTINUOUS GLUCOSE         MONITORING, CHANGE EVERY 3 MONTHS, Disp: 1 each, Rfl: 1    Glucagon (Baqsimi Two Pack) 3 MG/DOSE POWD, Use as needed for severe hypoglycemia Disp 1 two pack, Disp: 1 each, Rfl: 1    glucose blood (Accu-Chek Guide) test strip, Test BG up to 10x daily as directed, Disp: 900 strip, Rfl: 1    Insulin Degludec 100 UNIT/ML SOLN, Use 30units subcut once daily in case of pump failure, Disp: 10 mL, Rfl: 2    Insulin Disposable Pump (Omnipod 5 G6 Intro, Gen 5,) KIT, USE DAILY AS DIRECTED FOR  INSULIN THERAPY, Disp: 1 kit, Rfl: 0    Insulin Disposable Pump (Omnipod 5 G6 Pod, Gen 5,) MISC, USE 1 POD EVERY 72 HOURS   SUBCUTANEOUSLY, Disp: 30 each, Rfl: 1    Insulin Disposable Pump (Omnipod 5 G6 Pod, Gen 5,) MISC, USE 1 POD EVERY 72 HOURS   SUBCUTANEOUSLY, Disp: 10 each, Rfl: 0    insulin glulisine (Apidra) 100 units/mL injection, USE WITH INSULIN PUMP 80-100UNITS PER DAY WITH PUMP, Disp: 90 mL, Rfl: 3    Insulin Syringe 27G X 1/2" 1 ML MISC, Use 3 (three) times a day Use in case of pump failure, Disp: 100 each, Rfl: 1    Isopropyl Alcohol (Alcohol Wipes) 70 % MISC, Use up to 5 daily as directed, Disp: 500 each, Rfl: 1    levalbuterol (XOPENEX HFA) 45 mcg/act inhaler, Inhale 1-2 puffs every 4 (four) hours as needed for wheezing, Disp: , Rfl:     levalbuterol (XOPENEX) 0.31 mg/3 mL nebulizer solution, Take 0.31 mg by nebulization every 4 (four) hours as needed for wheezing, Disp: , Rfl:     levothyroxine 100 mcg tablet, Take 1 tablet (100 mcg total) by mouth daily Take one pill by mouth Mon- Sat and 1/2 tab on Sunday, Disp: 90 tablet, Rfl: 2    metoprolol succinate (TOPROL-XL) 25 mg 24 hr tablet, Take 25 mg by mouth every evening, Disp: , Rfl:     montelukast (SINGULAIR) 10 mg tablet, Take 10 mg by mouth daily at bedtime, Disp: , Rfl:     predniSONE 10 mg tablet, Take 4 tablets (40 mg total) by mouth daily for 7 days, THEN 3 tablets (30 mg total) daily for 7 days, THEN 2 tablets (20 mg total) daily for 7 days, THEN 1 tablet (10 mg total) daily for 28 days. , Disp: 91 tablet, Rfl: 0    rosuvastatin (CRESTOR) 20 MG tablet, Take 1 tablet (20 mg total) by mouth daily, Disp: 90 tablet, Rfl: 3    SERTRALINE HCL PO, Take 150 mg by mouth daily, Disp: , Rfl:     Review of Systems    Physical Exam:  There is no height or weight on file to calculate BMI.  There were no vitals taken for this visit. Wt Readings from Last 3 Encounters:   10/19/23 92.1 kg (203 lb)   09/27/23 87.5 kg (193 lb)   07/24/23 91.4 kg (201 lb 6.4 oz)       Physical Exam  Diabetic Foot Exam    Labs:   Lab Results   Component Value Date    HGBA1C 7.4 (H) 09/28/2023    HGBA1C 7.5 (A) 05/31/2023    HGBA1C 7.6 (A) 12/05/2022     Lab Results   Component Value Date    CREATININE 0.94 09/28/2023    CREATININE 1.00 01/02/2018    CREATININE 0.90 12/10/2017    BUN 14 09/28/2023    K 3.6 09/28/2023     09/28/2023    CO2 29 09/28/2023     eGFR   Date Value Ref Range Status   09/28/2023 72 ml/min/1.73sq m Final     No results found for: "CHOL", "HDL", "LDL", "TRIG", "CHOLHDL"  Lab Results   Component Value Date    ALT 17 09/28/2023    AST 22 09/28/2023    ALKPHOS 58 09/28/2023     Lab Results   Component Value Date    OYH4XOBRNXKG 2.263 09/28/2023     No results found for: "Meir Jackie", "TSI"    No orders of the defined types were placed in this encounter. There are no Patient Instructions on file for this visit. Discussed with the patient and all questioned fully answered. She will call me if any problems arise.     MUKUL Phelan

## 2023-10-20 NOTE — TELEPHONE ENCOUNTER
I called patient and relayed message below from [id number] Terry Foy Md, Dr and patient verbalized understanding.         ----- Message -----  From: Sage Cortes MD  Sent: 10/20/2023   8:14 AM EDT  To: Sigrid Hays    I would recommend her taking the additional 20mg on top of her scheduled 10mg dose for contrast allergy prep.

## 2023-10-20 NOTE — PROGRESS NOTES
Virtual Regular Visit    Verification of patient location:    Patient is located at Home in the following state in which I hold an active license [place]      Assessment/Plan:    Problem List Items Addressed This Visit          Endocrine    Hypothyroidism     Thyroid function testing within normal range - continue current dose of levothyroxine. 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 (HCC) - Primary     HGA1C is stable. She is experiencing post meal hyperglycemia. This is a combination of timing of bolus and increase in insulin requirements due to steroid use. Treatment regimen:   - Recommend she change carb ratio at 12am to 7 and 12pm to 6. Continue other insulin pump settings. - continue with CGM. - Reviewed back up method incase of pump failure. - She is using pods more frequently and going through insulin quicker so I have adjusted to change pod to every 48 hrs if needed. Discussed risks/complications associated with uncontrolled diabetes. Advised to adhere to diabetic diet, and recommended staying active/exercising routinely. Keep carbohydrates consistent to limit blood glucose fluctuations. Advised to call if blood sugars less than 70 mg/dl or over 300 mg/dl. Discussed symptoms and treatment of hypoglycemia. Recommended routine follow-up with podiatry and ophthalmology. Ordered blood work to complete prior to next visit.    Lab Results   Component Value Date    HGBA1C 7.4 (H) 09/28/2023          Relevant Medications    Insulin Disposable Pump (Omnipod 5 G6 Pod, Gen 5,) MISC    Other Relevant Orders    Hemoglobin A1C    Comprehensive metabolic panel    Albumin / creatinine urine ratio       Reason for visit is   Chief Complaint   Patient presents with    Virtual Regular Visit          Encounter provider MUKUL Bacon    Provider located at [address] [address]      Recent Visits  No visits were found meeting these conditions. Showing recent visits within past 7 days and meeting all other requirements  Today's Visits  Date Type Provider Dept   10/20/23 Telemedicine MUKUL [facility] [address] [facility]   Showing today's visits and meeting all other requirements  Future Appointments  No visits were found meeting these conditions. Showing future appointments within next 150 days and meeting all other requirements       The patient was identified by name and date of birth. Mary Kate Hernandez was informed that this is a telemedicine visit and that the visit is being conducted through the [employer]. She agrees to proceed. .  My office door was closed. No one else was in the room. She acknowledged consent and understanding of privacy and security of the video platform. The patient has agreed to participate and understands they can discontinue the visit at any time. Patient is aware this is a billable service. Subjective  Mary Kate Hernandez is a 53 y.o. female with a history of type 1 diabetes with long term use of insulin since age 12. Last seen in the office 5/31/23. Reports complications of retinopathy, neuropathy, and gastroparesis. Denies recent severe hypoglycemic or severe hyperglycemic episodes. Denies any issues with her current regimen. home glucose monitoring: are performed regularly using Dexcom G6. She has had issues with SOB, chest pressure and edema in lower extremities over the last several months. EKG and echo were normal. She has heart cath scheduled next week. She is also seeing pulmonology in [place] for sarcoidosis. Patient is on a Omnipod 5 pump prescribed by our office. She has been on a pump for many years but started this pump a few months ago. Previously used medtronic. She denies any malfunctioning of the pump. Current Insulin pump settings:  Basal rate:12AM 1.4, 2AM 1.1, 7A 1.0, 9AM 1.15, 12PM 1.25, 4:30PM 0.95, 7PM 1.0  Insulin to carb ratio:12AM 7.5, 12p 6.5. 430p 7  Insulin sensitivity factor:12AM 40, 3AM 30 12PM 40 5pm 25  BG target:110  Active Insulin time: 3 hours  Type of insulin:  Apidra  Daily dose 79.1 units; basal 45.9 units    Daily dose of insulin has increased recently due to restarting on steroids for sarcoidosis. Backup Plan: Has tresiba  Aware that in case of malfunctioning of the pump or unexplained hyperglycemia to use basal and bolus therapy as backup which is prescribed to the patient. Also notified patient to call clinic and/or pump company if any issues or go to emergency department if signs/symptoms of DKA. Durga Ours   Device used: Dexcom G6  Home use   Indication: Type 1 Diabetes  More than 72 hours of data was reviewed. Report to be scanned to chart. Date Range: 10/7/23-10/20/23  Analysis of data:   Average Glucose: 186  Coefficient of Variation: 35.8%   SD : 67   Time in Target Range: 51%   Time Above Range: 27% high, 20% very high    Time Below Range: 2% low    Interpretation of data: Post meal hyperglycemia. Has hypertension: Taking metoprolol   Has hyperlipidemia: Taking rosuvastatin   Has hypothyroidism: Taking levothyroxine 100 mcg, 1 tablet 6 days a week and 1/2 tab on Sunday.      Last Foot Exam: 5/31/23, UTD  Last Eye Exam: 8/10/23, UTD, positive       Past Medical History:   Diagnosis Date    Allergic rhinitis     Anxiety     Arthritis     osteo    Asthma     borderline    Chronic pain disorder     right knee, lower back    CTS (carpal tunnel syndrome)     Depression     Diabetes mellitus (HCC)     Type 1 x 36yrs,,"on insulin pump for approx 17 yrs or so"    Difficulty walking     Disease of thyroid gland     hypothyroid    Dry eyes, bilateral     Fatigue     GERD (gastroesophageal reflux disease)     gastroparesis    History of UTI     Kidney stone     several, hx of stent    Motion sickness     Nausea     Neuropathy     Neuropathy     Neuropathy in diabetes (HCC)     PONV (postoperative nausea and vomiting)     "pt reports scopalamine patch does help"    Presence of surgical incision     "small biopsy site with steri and bandaid left outer thigh per pt report"    Pulmonary nodule     PVC (premature ventricular contraction)     Sarcoidosis     "is currently being tested to see if has it"    Status post right partial knee replacement     right    Tooth missing     Wears glasses        Past Surgical History:   Procedure Laterality Date    APPENDECTOMY      CARPAL TUNNEL RELEASE Bilateral      SECTION      CYSTOSCOPY W/ LASER LITHOTRIPSY Left 2017    Procedure: CYSTOSCOPY, LEFT RETROGRADE PYELOGRAM LEFT URETEROSCOPY,  LEFT STENT INSERTION;  Surgeon: Juan Deleon MD;  [facility];  Service: Urology    DILATION AND CURETTAGE OF UTERUS      FINGER SURGERY      HYSTERECTOMY      JOINT REPLACEMENT Right     partial knee    KNEE ARTHROSCOPY Right     x4    KNEE SURGERY      IN CYSTO/URETERO W/LITHOTRIPSY &INDWELL STENT INSRT Left 2017    Procedure: CYSTOSCOPY URETEROSCOPY,  RETROGRADE PYELOGRAM AND INSERTION STENT URETERAL;  Surgeon: uJan Deleon MD;  [facility];  Service: Urology    REMOVAL URETERAL STENT  2017    SKIN BIOPSY Left     outer thigh    TRIGGER FINGER RELEASE      mul    URETERAL STENT PLACEMENT      VITRECTOMY Bilateral     right eye x2 and left x1    WISDOM TOOTH EXTRACTION         Current Outpatient Medications   Medication Sig Dispense Refill    Accu-Chek FastClix Lancets MISC Test BG up to 10x daily as directed 900 each 1    Acetone, Urine, Test (Ketone Test) STRP Use as needed to test urine if BG >300 or sick 100 strip 1    Antiseptic Products, Misc.  (IV Prep Wipes) 70 % PADS Use 1 daily as directed 100 each 1    Blood Glucose Calibration (Accu-Chek Guide Control) LIQD Use as needed (to calibrate) 1 each 1 Blood Glucose Monitoring Suppl (Accu-Chek Guide Me) w/Device KIT Test BG up to 10x daily as directed 1 kit 0    budesonide-formoterol (SYMBICORT) 160-4.5 mcg/act inhaler Inhale 2 puffs daily       cetirizine (ZyrTEC) 10 mg tablet Take 10 mg by mouth daily      Continuous Blood Gluc Sensor (Dexcom G6 Sensor) MISC USE DAILY AS DIRECTED FOR  CONTINUOUS GLUCOSE         MONITORING, CHANGE EVERY 10DAYS 9 each 0    Continuous Blood Gluc Transmit (Dexcom G6 Transmitter) MISC USE DAILY AS DIRECTED FOR  CONTINUOUS GLUCOSE         MONITORING, CHANGE EVERY 3 MONTHS 1 each 1    Glucagon (Baqsimi Two Pack) 3 MG/DOSE POWD Use as needed for severe hypoglycemia Disp 1 two pack 1 each 1    glucose blood (Accu-Chek Guide) test strip Test BG up to 10x daily as directed 900 strip 1    Insulin Degludec 100 UNIT/ML SOLN Use 30units subcut once daily in case of pump failure 10 mL 2    Insulin Disposable Pump (Omnipod 5 G6 Intro, Gen 5,) KIT USE DAILY AS DIRECTED FOR  INSULIN THERAPY 1 kit 0    Insulin Disposable Pump (Omnipod 5 G6 Pod, Gen 5,) MISC USE 1 POD EVERY 48 HOURS   SUBCUTANEOUSLY 50 each 0    insulin glulisine (Apidra) 100 units/mL injection USE WITH INSULIN PUMP 80-100UNITS PER DAY WITH PUMP 90 mL 3    Insulin Syringe 27G X 1/2" 1 ML MISC Use 3 (three) times a day Use in case of pump failure 100 each 1    Isopropyl Alcohol (Alcohol Wipes) 70 % MISC Use up to 5 daily as directed 500 each 1    levalbuterol (XOPENEX HFA) 45 mcg/act inhaler Inhale 1-2 puffs every 4 (four) hours as needed for wheezing      levalbuterol (XOPENEX) 0.31 mg/3 mL nebulizer solution Take 0.31 mg by nebulization every 4 (four) hours as needed for wheezing      levothyroxine 100 mcg tablet Take 1 tablet (100 mcg total) by mouth daily Take one pill by mouth Mon- Sat and 1/2 tab on Sunday 90 tablet 2    metoprolol succinate (TOPROL-XL) 25 mg 24 hr tablet Take 25 mg by mouth every evening      montelukast (SINGULAIR) 10 mg tablet Take 10 mg by mouth daily at bedtime      predniSONE 10 mg tablet Take 4 tablets (40 mg total) by mouth daily for 7 days, THEN 3 tablets (30 mg total) daily for 7 days, THEN 2 tablets (20 mg total) daily for 7 days, THEN 1 tablet (10 mg total) daily for 28 days. 91 tablet 0    rosuvastatin (CRESTOR) 20 MG tablet Take 1 tablet (20 mg total) by mouth daily 90 tablet 3    SERTRALINE HCL PO Take 150 mg by mouth daily      amoxicillin (AMOXIL) 875 mg tablet Take 875 mg by mouth 2 (two) times a day (Patient not taking: Reported on 10/20/2023)       No current facility-administered medications for this visit. Allergies   Allergen Reactions    Insulin Aspart Abdominal Pain     Humulog and Novulog  Welts under skin and insulin resistance    Morphine Other (See Comments)     Intensifies pain    Iodinated Contrast Media Hives     Pt can tolerate shellfish, pt can tolerate betadine. Pt pt she is not allergic to IV contrast    Levofloxacin     Oxycodone GI Intolerance     Pt is able to tolerate hydrocodone       Review of Systems   Constitutional:  Negative for activity change and appetite change. Eyes:  Positive for visual disturbance. Respiratory:  Positive for chest tightness and shortness of breath. Cardiovascular:  Positive for leg swelling. Negative for chest pain and palpitations. Gastrointestinal:  Negative for constipation and diarrhea. Endocrine: Negative for polydipsia, polyphagia and polyuria. Skin:  Negative for rash. Neurological:  Positive for numbness. Video Exam    Vitals:    10/20/23 1054   BP: 108/66   Pulse: 76   Weight: 92.1 kg (203 lb)   Height: 5' 3.5" (1.613 m)         Physical Exam   Constitutional: She is oriented to person, place, and time. She appears well-developed and well-nourished. No distress. HENT:   Head: Normocephalic and atraumatic. Neck: Normal range of motion. Pulmonary/Chest: Effort normal.   Musculoskeletal: Normal range of motion.    Neurological: She is alert and oriented to person, place, and time. Skin: She is not diaphoretic. Psychiatric: She has a normal mood and affect. His behavior is normal.      Visit Time  Total Visit Duration: 30 minutes.

## 2023-10-20 NOTE — ASSESSMENT & PLAN NOTE
HGA1C is stable. She is experiencing post meal hyperglycemia. This is a combination of timing of bolus and increase in insulin requirements due to steroid use. Treatment regimen:   - Recommend she change carb ratio at 12am to 7 and 12pm to 6. Continue other insulin pump settings. - continue with CGM. - Reviewed back up method incase of pump failure. - She is using pods more frequently and going through insulin quicker so I have adjusted to change pod to every 48 hrs if needed. Discussed risks/complications associated with uncontrolled diabetes. Advised to adhere to diabetic diet, and recommended staying active/exercising routinely. Keep carbohydrates consistent to limit blood glucose fluctuations. Advised to call if blood sugars less than 70 mg/dl or over 300 mg/dl. Discussed symptoms and treatment of hypoglycemia. Recommended routine follow-up with podiatry and ophthalmology. Ordered blood work to complete prior to next visit.    Lab Results   Component Value Date    HGBA1C 7.4 (H) 09/28/2023

## 2023-10-27 ENCOUNTER — HOSPITAL ENCOUNTER (OUTPATIENT)
Facility: HOSPITAL | Age: 48
Setting detail: OUTPATIENT SURGERY
Discharge: HOME | End: 2023-10-27
Attending: INTERNAL MEDICINE | Admitting: INTERNAL MEDICINE
Payer: COMMERCIAL

## 2023-10-27 VITALS
WEIGHT: 207 LBS | RESPIRATION RATE: 16 BRPM | HEART RATE: 84 BPM | SYSTOLIC BLOOD PRESSURE: 135 MMHG | HEIGHT: 64 IN | OXYGEN SATURATION: 94 % | TEMPERATURE: 96.8 F | DIASTOLIC BLOOD PRESSURE: 50 MMHG | BODY MASS INDEX: 35.34 KG/M2

## 2023-10-27 DIAGNOSIS — R06.09 DOE (DYSPNEA ON EXERTION): ICD-10-CM

## 2023-10-27 DIAGNOSIS — I20.9 ANGINA PECTORIS (HCC): ICD-10-CM

## 2023-10-27 DIAGNOSIS — I25.84 CORONARY ARTERY DISEASE DUE TO CALCIFIED CORONARY LESION: ICD-10-CM

## 2023-10-27 DIAGNOSIS — I25.10 CORONARY ARTERY DISEASE DUE TO CALCIFIED CORONARY LESION: ICD-10-CM

## 2023-10-27 LAB
ATRIAL RATE: 86 BPM
P AXIS: 52 DEGREES
PR INTERVAL: 132 MS
QRS AXIS: 33 DEGREES
QRSD INTERVAL: 78 MS
QT INTERVAL: 366 MS
QTC INTERVAL: 437 MS
T WAVE AXIS: 38 DEGREES
VENTRICULAR RATE: 86 BPM

## 2023-10-27 PROCEDURE — 99153 MOD SED SAME PHYS/QHP EA: CPT | Performed by: INTERNAL MEDICINE

## 2023-10-27 PROCEDURE — C1894 INTRO/SHEATH, NON-LASER: HCPCS | Performed by: INTERNAL MEDICINE

## 2023-10-27 PROCEDURE — C1769 GUIDE WIRE: HCPCS | Performed by: INTERNAL MEDICINE

## 2023-10-27 PROCEDURE — 93010 ELECTROCARDIOGRAM REPORT: CPT | Performed by: INTERNAL MEDICINE

## 2023-10-27 PROCEDURE — 93005 ELECTROCARDIOGRAM TRACING: CPT

## 2023-10-27 PROCEDURE — 99152 MOD SED SAME PHYS/QHP 5/>YRS: CPT | Performed by: INTERNAL MEDICINE

## 2023-10-27 PROCEDURE — 93454 CORONARY ARTERY ANGIO S&I: CPT | Performed by: INTERNAL MEDICINE

## 2023-10-27 RX ORDER — LIDOCAINE HYDROCHLORIDE 10 MG/ML
INJECTION, SOLUTION EPIDURAL; INFILTRATION; INTRACAUDAL; PERINEURAL CODE/TRAUMA/SEDATION MEDICATION
Status: DISCONTINUED | OUTPATIENT
Start: 2023-10-27 | End: 2023-10-27 | Stop reason: HOSPADM

## 2023-10-27 RX ORDER — SODIUM CHLORIDE 9 MG/ML
100 INJECTION, SOLUTION INTRAVENOUS CONTINUOUS
Status: DISCONTINUED | OUTPATIENT
Start: 2023-10-27 | End: 2023-10-27 | Stop reason: HOSPADM

## 2023-10-27 RX ORDER — MIDAZOLAM HYDROCHLORIDE 2 MG/2ML
INJECTION, SOLUTION INTRAMUSCULAR; INTRAVENOUS CODE/TRAUMA/SEDATION MEDICATION
Status: DISCONTINUED | OUTPATIENT
Start: 2023-10-27 | End: 2023-10-27 | Stop reason: HOSPADM

## 2023-10-27 RX ORDER — ONDANSETRON 2 MG/ML
4 INJECTION INTRAMUSCULAR; INTRAVENOUS ONCE AS NEEDED
Status: DISCONTINUED | OUTPATIENT
Start: 2023-10-27 | End: 2023-10-27 | Stop reason: HOSPADM

## 2023-10-27 RX ORDER — ONDANSETRON 2 MG/ML
INJECTION INTRAMUSCULAR; INTRAVENOUS CODE/TRAUMA/SEDATION MEDICATION
Status: DISCONTINUED | OUTPATIENT
Start: 2023-10-27 | End: 2023-10-27 | Stop reason: HOSPADM

## 2023-10-27 RX ORDER — HEPARIN SODIUM 1000 [USP'U]/ML
INJECTION, SOLUTION INTRAVENOUS; SUBCUTANEOUS CODE/TRAUMA/SEDATION MEDICATION
Status: DISCONTINUED | OUTPATIENT
Start: 2023-10-27 | End: 2023-10-27 | Stop reason: HOSPADM

## 2023-10-27 RX ORDER — VERAPAMIL HCL 2.5 MG/ML
AMPUL (ML) INTRAVENOUS CODE/TRAUMA/SEDATION MEDICATION
Status: DISCONTINUED | OUTPATIENT
Start: 2023-10-27 | End: 2023-10-27 | Stop reason: HOSPADM

## 2023-10-27 RX ORDER — ACETAMINOPHEN 325 MG/1
975 TABLET ORAL ONCE AS NEEDED
Status: DISCONTINUED | OUTPATIENT
Start: 2023-10-27 | End: 2023-10-27 | Stop reason: HOSPADM

## 2023-10-27 RX ORDER — ASPIRIN 81 MG/1
324 TABLET, CHEWABLE ORAL ONCE
Status: COMPLETED | OUTPATIENT
Start: 2023-10-27 | End: 2023-10-27

## 2023-10-27 RX ORDER — NITROGLYCERIN 0.4 MG/1
0.4 TABLET SUBLINGUAL ONCE AS NEEDED
Status: DISCONTINUED | OUTPATIENT
Start: 2023-10-27 | End: 2023-10-27 | Stop reason: HOSPADM

## 2023-10-27 RX ORDER — FENTANYL CITRATE 50 UG/ML
INJECTION, SOLUTION INTRAMUSCULAR; INTRAVENOUS CODE/TRAUMA/SEDATION MEDICATION
Status: DISCONTINUED | OUTPATIENT
Start: 2023-10-27 | End: 2023-10-27 | Stop reason: HOSPADM

## 2023-10-27 RX ORDER — SODIUM CHLORIDE 9 MG/ML
125 INJECTION, SOLUTION INTRAVENOUS CONTINUOUS
Status: DISCONTINUED | OUTPATIENT
Start: 2023-10-27 | End: 2023-10-27

## 2023-10-27 RX ORDER — NITROGLYCERIN 20 MG/100ML
INJECTION INTRAVENOUS CODE/TRAUMA/SEDATION MEDICATION
Status: DISCONTINUED | OUTPATIENT
Start: 2023-10-27 | End: 2023-10-27 | Stop reason: HOSPADM

## 2023-10-27 RX ORDER — CLOPIDOGREL BISULFATE 75 MG/1
600 TABLET ORAL ONCE
Status: COMPLETED | OUTPATIENT
Start: 2023-10-27 | End: 2023-10-27

## 2023-10-27 RX ADMIN — SODIUM CHLORIDE 100 ML/HR: 0.9 INJECTION, SOLUTION INTRAVENOUS at 10:46

## 2023-10-27 RX ADMIN — CLOPIDOGREL BISULFATE 600 MG: 75 TABLET ORAL at 08:02

## 2023-10-27 RX ADMIN — SODIUM CHLORIDE 125 ML/HR: 0.9 INJECTION, SOLUTION INTRAVENOUS at 08:06

## 2023-10-27 RX ADMIN — ASPIRIN 81 MG CHEWABLE TABLET 324 MG: 81 TABLET CHEWABLE at 08:02

## 2023-10-27 NOTE — INTERVAL H&P NOTE
H&P reviewed. After examining the patient, I find no changed to the H&P since it had been written. 48F w/ T1DM, HLD p/w SUTHERLAND and chest heaviness w/ exertion is referred for LHC. /66 (BP Location: Right arm)   Pulse 97   Temp (!) 97.4 °F (36.3 °C) (Temporal)   Resp 16   Ht 5' 3.5" (1.613 m)   Wt 93.9 kg (207 lb)   SpO2 95%   BMI 36.09 kg/m²      Patient re-evaluated.  Accept as history and physical.    Nitesh Whyte, DO/October 27, 2023/8:59 AM

## 2023-10-27 NOTE — DISCHARGE INSTR - AVS FIRST PAGE
1. Please see the post cardiac catheterization dishcarge instructions. No heavy lifting, greater than 10 lbs. or strenuous  activity for 48 hrs. 2.Remove band aid tomorrow. Shower and wash area- wrist gently with soap and water- beginning tomorrow. Rinse and pat dry. Apply new water seal band aid. Repeat this process for 5 days. No powders, creams lotions or antibiotic ointments  for 5 days. No tub baths, hot tubs or swimming for 5 days. 3. Please call our office ([phone]) if you have any fever, redness, swelling, discharge from your wrist access site.     4.No driving for 1 day

## 2023-10-27 NOTE — Clinical Note
Gulshan inserted to wire arm and removed Silver Nitrate Text: The wound bed was treated with silver nitrate after the biopsy was performed. Render Post-Care Instructions In Note?: yes Hide Additional Anticipated Plan?: No Biopsy Type: H and E Cryotherapy Text: The wound bed was treated with cryotherapy after the biopsy was performed. Hemostasis: Aluminum Chloride Curettage Text: The wound bed was treated with curettage after the biopsy was performed. Dressing: bandage X Size Of Lesion In Cm: 0 Detail Level: Detailed Consent: Written consent was obtained and risks were reviewed including but not limited to scarring, infection, bleeding, scabbing, incomplete removal, nerve damage and allergy to anesthesia. Anesthesia Volume In Cc: 0.3 Notification Instructions: Patient will be notified of biopsy results. However, patient instructed to call the office if not contacted within 2 weeks. Anesthesia Type: 1% lidocaine with epinephrine Post-Care Instructions: I reviewed with the patient in detail post-care instructions. Patient is to keep the biopsy site dry overnight, and then apply petrolatum twice daily until healed. Patient may apply hydrogen peroxide soaks to remove any crusting. Biopsy Method: 15 blade Type Of Destruction Used: Curettage Electrodesiccation And Curettage Text: The wound bed was treated with electrodesiccation and curettage after the biopsy was performed. Depth Of Biopsy: dermis Information: Selecting Yes will display possible errors in your note based on the variables you have selected. This validation is only offered as a suggestion for you. PLEASE NOTE THAT THE VALIDATION TEXT WILL BE REMOVED WHEN YOU FINALIZE YOUR NOTE. IF YOU WANT TO FAX A PRELIMINARY NOTE YOU WILL NEED TO TOGGLE THIS TO 'NO' IF YOU DO NOT WANT IT IN YOUR FAXED NOTE. Billing Type: Third-Party Bill Wound Care: Petrolatum Electrodesiccation Text: The wound bed was treated with electrodesiccation after the biopsy was performed.

## 2023-10-27 NOTE — Clinical Note
Post procedure Theres is fully awake alert responsive cooperative and appropriate. Without complaints.   Readied for transfer

## 2023-10-27 NOTE — Clinical Note
Spoke with Marely Kwon MD and informed consent obtained for procedure, offers no questions.   Without complaints

## 2023-11-21 DIAGNOSIS — E10.3599 TYPE 1 DIABETES MELLITUS WITH PROLIFERATIVE RETINOPATHY WITHOUT MACULAR EDEMA, UNSPECIFIED LATERALITY (HCC): ICD-10-CM

## 2023-11-21 RX ORDER — PROCHLORPERAZINE 25 MG/1
SUPPOSITORY RECTAL
Qty: 9 EACH | Refills: 0 | Status: SHIPPED | OUTPATIENT
Start: 2023-11-21

## 2023-12-31 DIAGNOSIS — E10.3593 TYPE 1 DIABETES MELLITUS WITH PROLIFERATIVE RETINOPATHY OF BOTH EYES WITHOUT MACULAR EDEMA (HCC): ICD-10-CM

## 2024-01-02 RX ORDER — INSULIN PMP CART,AUT,G6/7,CNTR
EACH SUBCUTANEOUS
Qty: 45 EACH | Refills: 0 | Status: SHIPPED | OUTPATIENT
Start: 2024-01-02

## 2024-01-18 ENCOUNTER — PATIENT MESSAGE (OUTPATIENT)
Dept: CARDIOLOGY CLINIC | Facility: CLINIC | Age: 49
End: 2024-01-18

## 2024-01-29 DIAGNOSIS — I25.84 CORONARY ARTERY DISEASE DUE TO CALCIFIED CORONARY LESION: Primary | ICD-10-CM

## 2024-01-29 DIAGNOSIS — I25.10 CORONARY ARTERY DISEASE DUE TO CALCIFIED CORONARY LESION: Primary | ICD-10-CM

## 2024-01-29 RX ORDER — METOPROLOL SUCCINATE 25 MG/1
25 TABLET, EXTENDED RELEASE ORAL EVERY EVENING
Qty: 30 TABLET | Refills: 0 | Status: SHIPPED | OUTPATIENT
Start: 2024-01-29 | End: 2024-01-30

## 2024-01-30 DIAGNOSIS — I25.10 CORONARY ARTERY DISEASE DUE TO CALCIFIED CORONARY LESION: ICD-10-CM

## 2024-01-30 DIAGNOSIS — I25.84 CORONARY ARTERY DISEASE DUE TO CALCIFIED CORONARY LESION: ICD-10-CM

## 2024-01-30 RX ORDER — METOPROLOL SUCCINATE 25 MG/1
25 TABLET, EXTENDED RELEASE ORAL DAILY
Qty: 90 TABLET | Refills: 0 | Status: SHIPPED | OUTPATIENT
Start: 2024-01-30

## 2024-02-12 DIAGNOSIS — E10.3599 TYPE 1 DIABETES MELLITUS WITH PROLIFERATIVE RETINOPATHY WITHOUT MACULAR EDEMA, UNSPECIFIED LATERALITY (HCC): ICD-10-CM

## 2024-02-12 RX ORDER — PROCHLORPERAZINE 25 MG/1
SUPPOSITORY RECTAL
Qty: 9 EACH | Refills: 0 | Status: SHIPPED | OUTPATIENT
Start: 2024-02-12

## 2024-02-26 DIAGNOSIS — E10.3599 TYPE 1 DIABETES MELLITUS WITH PROLIFERATIVE RETINOPATHY WITHOUT MACULAR EDEMA, UNSPECIFIED LATERALITY (HCC): ICD-10-CM

## 2024-02-26 RX ORDER — PROCHLORPERAZINE 25 MG/1
SUPPOSITORY RECTAL
Qty: 1 EACH | Refills: 1 | Status: SHIPPED | OUTPATIENT
Start: 2024-02-26

## 2024-02-27 RX ORDER — BLOOD SUGAR DIAGNOSTIC
STRIP MISCELLANEOUS
Qty: 900 STRIP | Refills: 0 | Status: SHIPPED | OUTPATIENT
Start: 2024-02-27

## 2024-03-01 ENCOUNTER — APPOINTMENT (OUTPATIENT)
Dept: LAB | Facility: CLINIC | Age: 49
End: 2024-03-01
Payer: COMMERCIAL

## 2024-03-01 DIAGNOSIS — Z79.899 ENCOUNTER FOR LONG-TERM (CURRENT) USE OF OTHER MEDICATIONS: ICD-10-CM

## 2024-03-01 DIAGNOSIS — E10.3593 TYPE 1 DIABETES MELLITUS WITH PROLIFERATIVE RETINOPATHY OF BOTH EYES WITHOUT MACULAR EDEMA (HCC): ICD-10-CM

## 2024-03-01 LAB
ALBUMIN SERPL BCP-MCNC: 4.1 G/DL (ref 3.5–5)
ALP SERPL-CCNC: 55 U/L (ref 34–104)
ALT SERPL W P-5'-P-CCNC: 17 U/L (ref 7–52)
ANION GAP SERPL CALCULATED.3IONS-SCNC: 5 MMOL/L
AST SERPL W P-5'-P-CCNC: 17 U/L (ref 13–39)
BASOPHILS # BLD AUTO: 0.1 THOUSANDS/ÂΜL (ref 0–0.1)
BASOPHILS NFR BLD AUTO: 1 % (ref 0–1)
BILIRUB SERPL-MCNC: 0.63 MG/DL (ref 0.2–1)
BUN SERPL-MCNC: 18 MG/DL (ref 5–25)
CALCIUM SERPL-MCNC: 9.1 MG/DL (ref 8.4–10.2)
CHLORIDE SERPL-SCNC: 102 MMOL/L (ref 96–108)
CO2 SERPL-SCNC: 32 MMOL/L (ref 21–32)
CREAT SERPL-MCNC: 1.02 MG/DL (ref 0.6–1.3)
CREAT UR-MCNC: 92.2 MG/DL
EOSINOPHIL # BLD AUTO: 0.28 THOUSAND/ÂΜL (ref 0–0.61)
EOSINOPHIL NFR BLD AUTO: 2 % (ref 0–6)
ERYTHROCYTE [DISTWIDTH] IN BLOOD BY AUTOMATED COUNT: 16.2 % (ref 11.6–15.1)
EST. AVERAGE GLUCOSE BLD GHB EST-MCNC: 174 MG/DL
GFR SERPL CREATININE-BSD FRML MDRD: 65 ML/MIN/1.73SQ M
GLUCOSE SERPL-MCNC: 208 MG/DL (ref 65–140)
HBA1C MFR BLD: 7.7 %
HCT VFR BLD AUTO: 41.7 % (ref 34.8–46.1)
HGB BLD-MCNC: 13.2 G/DL (ref 11.5–15.4)
IMM GRANULOCYTES # BLD AUTO: 0.13 THOUSAND/UL (ref 0–0.2)
IMM GRANULOCYTES NFR BLD AUTO: 1 % (ref 0–2)
LYMPHOCYTES # BLD AUTO: 1.52 THOUSANDS/ÂΜL (ref 0.6–4.47)
LYMPHOCYTES NFR BLD AUTO: 11 % (ref 14–44)
MCH RBC QN AUTO: 31.8 PG (ref 26.8–34.3)
MCHC RBC AUTO-ENTMCNC: 31.7 G/DL (ref 31.4–37.4)
MCV RBC AUTO: 101 FL (ref 82–98)
MICROALBUMIN UR-MCNC: 11.9 MG/L
MICROALBUMIN/CREAT 24H UR: 13 MG/G CREATININE (ref 0–30)
MONOCYTES # BLD AUTO: 0.75 THOUSAND/ÂΜL (ref 0.17–1.22)
MONOCYTES NFR BLD AUTO: 5 % (ref 4–12)
NEUTROPHILS # BLD AUTO: 11.2 THOUSANDS/ÂΜL (ref 1.85–7.62)
NEUTS SEG NFR BLD AUTO: 80 % (ref 43–75)
NRBC BLD AUTO-RTO: 0 /100 WBCS
PLATELET # BLD AUTO: 280 THOUSANDS/UL (ref 149–390)
PMV BLD AUTO: 11.1 FL (ref 8.9–12.7)
POTASSIUM SERPL-SCNC: 4.5 MMOL/L (ref 3.5–5.3)
PROT SERPL-MCNC: 6.7 G/DL (ref 6.4–8.4)
RBC # BLD AUTO: 4.15 MILLION/UL (ref 3.81–5.12)
SODIUM SERPL-SCNC: 139 MMOL/L (ref 135–147)
WBC # BLD AUTO: 13.98 THOUSAND/UL (ref 4.31–10.16)

## 2024-03-01 PROCEDURE — 82570 ASSAY OF URINE CREATININE: CPT

## 2024-03-01 PROCEDURE — 83036 HEMOGLOBIN GLYCOSYLATED A1C: CPT

## 2024-03-01 PROCEDURE — 82043 UR ALBUMIN QUANTITATIVE: CPT

## 2024-04-02 ENCOUNTER — OFFICE VISIT (OUTPATIENT)
Dept: ENDOCRINOLOGY | Facility: CLINIC | Age: 49
End: 2024-04-02
Payer: COMMERCIAL

## 2024-04-02 VITALS
OXYGEN SATURATION: 98 % | HEART RATE: 81 BPM | HEIGHT: 63 IN | DIASTOLIC BLOOD PRESSURE: 78 MMHG | BODY MASS INDEX: 36.32 KG/M2 | WEIGHT: 205 LBS | SYSTOLIC BLOOD PRESSURE: 120 MMHG

## 2024-04-02 DIAGNOSIS — E03.9 ACQUIRED HYPOTHYROIDISM: ICD-10-CM

## 2024-04-02 DIAGNOSIS — Z96.41 INSULIN PUMP STATUS: ICD-10-CM

## 2024-04-02 DIAGNOSIS — E10.3599 TYPE 1 DIABETES MELLITUS WITH PROLIFERATIVE RETINOPATHY WITHOUT MACULAR EDEMA, UNSPECIFIED LATERALITY (HCC): Primary | ICD-10-CM

## 2024-04-02 DIAGNOSIS — E78.5 DYSLIPIDEMIA: ICD-10-CM

## 2024-04-02 PROCEDURE — 99214 OFFICE O/P EST MOD 30 MIN: CPT

## 2024-04-02 PROCEDURE — 95251 CONT GLUC MNTR ANALYSIS I&R: CPT

## 2024-04-02 RX ORDER — PREDNISONE 10 MG/1
10 TABLET ORAL DAILY
COMMUNITY

## 2024-04-02 NOTE — PROGRESS NOTES
Established Patient Progress Note      Chief Complaint   Patient presents with    Diabetes Type 1        Impression & Plan:    Problem List Items Addressed This Visit          Endocrine    Hypothyroidism     Thyroid function within target range. Continue current dose of levothyroxine.          Relevant Medications    predniSONE 10 mg tablet    Other Relevant Orders    TSH, 3rd generation with Free T4 reflex    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 (HCC) - Primary     HGA1C uncontrolled. BGs in range 51% of the time with hyperglycemia and occasional hypoglycemia.   Treatment regimen:   - Adjusted correction factor to 30 and 20 and carb ratio to 5 at 4:30 pm.   - Continue with insulin pump and CGM.  - She has back up insulin incase of pump failure.   Discussed risks/complications associated with uncontrolled diabetes.    Advised to adhere to diabetic diet, and recommended staying active/exercising routinely.  Keep carbohydrates consistent to limit blood glucose fluctuations.  Advised to call if blood sugars less than 70 mg/dl or over 300 mg/dl.   Discussed symptoms and treatment of hypoglycemia.    Recommended routine follow-up with podiatry and ophthalmology.   Ordered blood work to complete prior to next visit.   Lab Results   Component Value Date    HGBA1C 7.7 (H) 03/01/2024            Relevant Orders    Hemoglobin A1C    Comprehensive metabolic panel       Other    Dyslipidemia     Continue statin. Check lipid panel.          Relevant Orders    Lipid panel    Insulin pump status     Continue insulin pump. See adjustments above.             History of Present Illness:   Kimberlee Nicholas is a 48 y.o. female with type 1 diabetes seen in follow up. Reports complications of retinopathy, neuropathy, and gastroparesis. Denies recent severe hypoglycemic or severe hyperglycemic episodes. Denies any issues with her current regimen. Last A1C was 7.7. Home glucose monitoring: are performed  regularly with CGM.     She has had extensive work up in the past few months due to SOB. She is seeing pulmonology at [facility] for sarcoidosis. She is currently on steroids for treatment.     Current Insulin pump settings:  Basal rate:12AM 1.4, 2AM 1.1, 7A 1.0, 9AM 1.15, 12PM 1.25, 4:30PM 1.15, 7PM 1.0  Insulin to carb ratio:12AM 7, 12p 6. 430p 6  Insulin sensitivity factor:12AM 40, 3AM 30 12PM 40 5pm 25  BG target:110  Active Insulin time: 3 hours  Type of insulin:  Apidra  Daily dose 72.9 units; basal 43.7 units    Kimberlee Cristopher   Device used: Dexcom G6  Home use   Indication: Type 1 Diabetes  More than 72 hours of data was reviewed. Report to be scanned to chart.   Date Range: 3/20/24-4/2/24  Analysis of data:   Average Glucose: 181  Coefficient of Variation: 37.5%   SD : 68   Time in Target Range: 51%   Time Above Range: 31% high, 16% very high    Time Below Range: 2% low    Interpretation of data: Hyperglycemia typically in the afternoon and evening. Occasional lows in the early morning around 6am.     Last Eye Exam: UTD  Last Foot Exam: UTD    Has hypertension: Taking metoprolol   Has hyperlipidemia: Taking rosuvastatin   Has hypothyroidism: Taking levothyroxine 100 mcg, 1 tablet 6 days a week and 1/2 tab on Sunday.    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 (HCC)    Sarcoidosis    Medical marijuana use    Personal history of kidney stones    Premature ventricular contractions    Trigger finger of left thumb    Pulmonary nodules    Current chronic use of systemic steroids    Vitamin D deficiency    Radicular pain of both lower extremities    Brisk deep tendon reflexes    Weakness of both hands    Radiculopathy, lumbar region    Axonal neuropathy    Shortness of breath    Lower extremity  "edema    Coronary artery disease involving native coronary artery of native heart without angina pectoris      Past Medical History:   Diagnosis Date    Allergic rhinitis     Anxiety     Arthritis     osteo    Asthma     borderline    Chronic pain disorder     right knee, lower back    CTS (carpal tunnel syndrome)     Depression     Diabetes mellitus (HCC)     Type 1 x 36yrs,,\"on insulin pump for approx 17 yrs or so\"    Difficulty walking     Disease of thyroid gland     hypothyroid    Dry eyes, bilateral     Fatigue     GERD (gastroesophageal reflux disease)     gastroparesis    History of UTI     Kidney stone     several, hx of stent    Motion sickness     Nausea     Neuropathy     Neuropathy     Neuropathy in diabetes (HCC)     PONV (postoperative nausea and vomiting)     \"pt reports scopalamine patch does help\"    Presence of surgical incision     \"small biopsy site with steri and bandaid left outer thigh per pt report\"    Pulmonary nodule     PVC (premature ventricular contraction)     Sarcoidosis     \"is currently being tested to see if has it\"    Status post right partial knee replacement     right    Tooth missing     Wears glasses       Past Surgical History:   Procedure Laterality Date    APPENDECTOMY      CARDIAC CATHETERIZATION  10/27/2023    Procedure: Cardiac catheterization;  Surgeon: Keyon Mann MD;  Location: [facility];  Service: Cardiology    CARDIAC CATHETERIZATION N/A 10/27/2023    Procedure: Cardiac Coronary Angiogram;  Surgeon: Keyon Mann MD;  Location: [facility];  Service: Cardiology    CARPAL TUNNEL RELEASE Bilateral      SECTION      CYSTOSCOPY W/ LASER LITHOTRIPSY Left 2017    Procedure: CYSTOSCOPY, LEFT RETROGRADE PYELOGRAM LEFT URETEROSCOPY,  LEFT STENT INSERTION;  Surgeon: Nav Dougherty MD;  Location: [facility] OR;  Service: Urology    DILATION AND CURETTAGE OF UTERUS      FINGER SURGERY      HYSTERECTOMY      JOINT REPLACEMENT Right     partial " knee    KNEE ARTHROSCOPY Right     x4    KNEE SURGERY      NM CYSTO/URETERO W/LITHOTRIPSY &INDWELL STENT INSRT Left 2017    Procedure: CYSTOSCOPY URETEROSCOPY,  RETROGRADE PYELOGRAM AND INSERTION STENT URETERAL;  Surgeon: Nav Dougherty MD;  Location: [facility];  Service: Urology    REMOVAL URETERAL STENT  2017    SKIN BIOPSY Left     outer thigh    TRIGGER FINGER RELEASE      mul    URETERAL STENT PLACEMENT      VITRECTOMY Bilateral     right eye x2 and left x1    WISDOM TOOTH EXTRACTION        Social History     Tobacco Use    Smoking status: Former     Current packs/day: 0.00     Average packs/day: 0.3 packs/day for 12.5 years (3.1 ttl pk-yrs)     Types: Cigarettes     Start date:      Quit date: 2003     Years since quittin.7    Smokeless tobacco: Never    Tobacco comments:     Vaped for a couple of months during .   Substance Use Topics    Alcohol use: Yes     Comment: On occasion, not daily     Allergies   Allergen Reactions    Insulin Aspart Abdominal Pain     Humulog and Novulog  Welts under skin and insulin resistance    Morphine Other (See Comments)     Intensifies pain    Iodinated Contrast Media Hives     Pt can tolerate shellfish, pt can tolerate betadine. Pt pt she is not allergic to IV contrast    Levofloxacin     Oxycodone GI Intolerance     Pt is able to tolerate hydrocodone         Current Outpatient Medications:     Accu-Chek FastClix Lancets MISC, Test BG up to 10x daily as directed, Disp: 900 each, Rfl: 1    Acetone, Urine, Test (Ketone Test) STRP, Use as needed to test urine if BG >300 or sick, Disp: 100 strip, Rfl: 1    Antiseptic Products, Misc. (IV Prep Wipes) 70 % PADS, Use 1 daily as directed, Disp: 100 each, Rfl: 1    Blood Glucose Calibration (Accu-Chek Guide Control) LIQD, Use as needed (to calibrate), Disp: 1 each, Rfl: 1    Blood Glucose Monitoring Suppl (Accu-Chek Guide Me) w/Device KIT, Test BG up to 10x daily as directed, Disp: 1 kit, Rfl: 0     "budesonide-formoterol (SYMBICORT) 160-4.5 mcg/act inhaler, Inhale 2 puffs daily , Disp: , Rfl:     cetirizine (ZyrTEC) 10 mg tablet, Take 10 mg by mouth daily, Disp: , Rfl:     Continuous Blood Gluc Sensor (Dexcom G6 Sensor) MISC, USE DAILY AS DIRECTED FOR  CONTINUOUS GLUCOSE         MONITORING, CHANGE EVERY 10DAYS, Disp: 9 each, Rfl: 0    Continuous Blood Gluc Transmit (Dexcom G6 Transmitter) MISC, USE DAILY AS DIRECTED FOR  CONTINUOUS GLUCOSE         MONITORING, CHANGE EVERY 3 MONTHS, Disp: 1 each, Rfl: 1    Glucagon (Baqsimi Two Pack) 3 MG/DOSE POWD, Use as needed for severe hypoglycemia Disp 1 two pack, Disp: 1 each, Rfl: 1    glucose blood (Accu-Chek Guide) test strip, Test BG up to 10x daily as directed, Disp: 900 strip, Rfl: 0    Insulin Degludec 100 UNIT/ML SOLN, Use 30units subcut once daily in case of pump failure, Disp: 10 mL, Rfl: 2    Insulin Disposable Pump (Omnipod 5 G6 Intro, Gen 5,) KIT, USE DAILY AS DIRECTED FOR  INSULIN THERAPY, Disp: 1 kit, Rfl: 0    Insulin Disposable Pump (Omnipod 5 G6 Pod, Gen 5,) MISC, USE 1 POD EVERY 48 HOURS   SUBCUTANEOUSLY, Disp: 45 each, Rfl: 0    insulin glulisine (Apidra) 100 units/mL injection, USE WITH INSULIN PUMP 80-100UNITS PER DAY WITH PUMP, Disp: 90 mL, Rfl: 3    Insulin Syringe 27G X 1/2\" 1 ML MISC, Use 3 (three) times a day Use in case of pump failure, Disp: 100 each, Rfl: 1    Isopropyl Alcohol (Alcohol Wipes) 70 % MISC, Use up to 5 daily as directed, Disp: 500 each, Rfl: 1    levalbuterol (XOPENEX HFA) 45 mcg/act inhaler, Inhale 1-2 puffs every 4 (four) hours as needed for wheezing, Disp: , Rfl:     levalbuterol (XOPENEX) 0.31 mg/3 mL nebulizer solution, Take 0.31 mg by nebulization every 4 (four) hours as needed for wheezing, Disp: , Rfl:     levothyroxine 100 mcg tablet, Take 1 tablet (100 mcg total) by mouth daily Take one pill by mouth Mon- Sat and 1/2 tab on Sunday, Disp: 90 tablet, Rfl: 2    methotrexate 15 MG tablet, Take 17.5 mg by mouth once a week, " "Disp: , Rfl:     metoprolol succinate (TOPROL-XL) 25 mg 24 hr tablet, Take 1 tablet (25 mg total) by mouth daily, Disp: 90 tablet, Rfl: 0    montelukast (SINGULAIR) 10 mg tablet, Take 10 mg by mouth daily at bedtime, Disp: , Rfl:     predniSONE 10 mg tablet, Take 10 mg by mouth daily, Disp: , Rfl:     rosuvastatin (CRESTOR) 20 MG tablet, Take 1 tablet (20 mg total) by mouth daily, Disp: 90 tablet, Rfl: 3    SERTRALINE HCL PO, Take 150 mg by mouth daily, Disp: , Rfl:     Review of Systems   Constitutional:  Negative for activity change, appetite change, chills, diaphoresis, fatigue, fever and unexpected weight change.   Eyes:  Positive for visual disturbance (blurry in left eye).   Respiratory:  Positive for shortness of breath (improved but still with SUTHERLAND). Negative for chest tightness.    Cardiovascular:  Negative for chest pain, palpitations and leg swelling.   Gastrointestinal:  Positive for nausea (occasional). Negative for abdominal pain, constipation, diarrhea and vomiting.   Endocrine: Negative for polydipsia, polyphagia and polyuria.   Skin:  Negative for color change, pallor, rash and wound.   Neurological:  Negative for dizziness, tremors, weakness, light-headedness and numbness.   All other systems reviewed and are negative.      Physical Exam:  Body mass index is 36.31 kg/m².  /78   Pulse 81   Ht 5' 3\" (1.6 m)   Wt 93 kg (205 lb)   SpO2 98%   BMI 36.31 kg/m²    Wt Readings from Last 3 Encounters:   04/02/24 93 kg (205 lb)   10/27/23 93.9 kg (207 lb)   10/20/23 92.1 kg (203 lb)       Physical Exam  Vitals reviewed.   Constitutional:       Appearance: Normal appearance. She is obese.   Pulmonary:      Effort: Pulmonary effort is normal.   Neurological:      Mental Status: She is alert and oriented to person, place, and time.   Psychiatric:         Mood and Affect: Mood normal.         Thought Content: Thought content normal.       Labs:   Lab Results   Component Value Date    HGBA1C 7.7 (H) " 03/01/2024    HGBA1C 7.4 (H) 09/28/2023    HGBA1C 7.5 (A) 05/31/2023     Lab Results   Component Value Date    CREATININE 1.02 03/01/2024    CREATININE 0.98 12/17/2023    CREATININE 0.94 09/28/2023    BUN 18 03/01/2024    K 4.5 03/01/2024     03/01/2024    CO2 32 03/01/2024     GFR, Calculated   Date Value Ref Range Status   09/04/2019 70 >60 mL/min/1.73m2 Final     Comment:     mL/min per 1.73 square meters                                            Normal Function or Mild Renal    Disease (if clinically at risk):  >or=60  Moderately Decreased:                30-59  Severely Decreased:                  15-29  Renal Failure:                         <15                                            -American GFR: multiply reported GFR by 1.16    Please note that the eGFR is based on the CKD-EPI calculation, and is not intended to be used for drug dosing.                                            Note: Calculated GFR may not be an accurate indicator of renal function if the patient's renal function is not in a steady state.     eGFRcr   Date Value Ref Range Status   12/17/2023 71 >59 Final     eGFR   Date Value Ref Range Status   03/01/2024 65 ml/min/1.73sq m Final     Lab Results   Component Value Date    ALT 17 03/01/2024    AST 17 03/01/2024    ALKPHOS 55 03/01/2024     Lab Results   Component Value Date    [id number] 2.263 09/28/2023       There are no Patient Instructions on file for this visit.    Discussed with the patient and all questioned fully answered. She will call me if any problems arise.    MUKUL Arellano

## 2024-04-02 NOTE — ASSESSMENT & PLAN NOTE
HGA1C uncontrolled. BGs in range 51% of the time with hyperglycemia and occasional hypoglycemia.   Treatment regimen:   - Adjusted correction factor to 30 and 20 and carb ratio to 5 at 4:30 pm.   - Continue with insulin pump and CGM.  - She has back up insulin incase of pump failure.   Discussed risks/complications associated with uncontrolled diabetes.    Advised to adhere to diabetic diet, and recommended staying active/exercising routinely.  Keep carbohydrates consistent to limit blood glucose fluctuations.  Advised to call if blood sugars less than 70 mg/dl or over 300 mg/dl.   Discussed symptoms and treatment of hypoglycemia.    Recommended routine follow-up with podiatry and ophthalmology.   Ordered blood work to complete prior to next visit.   Lab Results   Component Value Date    HGBA1C 7.7 (H) 03/01/2024

## 2024-04-24 ENCOUNTER — TELEPHONE (OUTPATIENT)
Age: 49
End: 2024-04-24

## 2024-04-24 NOTE — TELEPHONE ENCOUNTER
Patient called in states that her insurance no longer covers the Apidra, so she called her insurance and they told her they will cover Fiasp.  Please advise if this can be changed?  Please let patient know on her Mychart.

## 2024-04-25 ENCOUNTER — OFFICE VISIT (OUTPATIENT)
Dept: CARDIOLOGY CLINIC | Facility: CLINIC | Age: 49
End: 2024-04-25
Payer: COMMERCIAL

## 2024-04-25 ENCOUNTER — TELEPHONE (OUTPATIENT)
Age: 49
End: 2024-04-25

## 2024-04-25 VITALS
HEIGHT: 63 IN | WEIGHT: 205 LBS | DIASTOLIC BLOOD PRESSURE: 62 MMHG | BODY MASS INDEX: 36.32 KG/M2 | HEART RATE: 85 BPM | SYSTOLIC BLOOD PRESSURE: 118 MMHG

## 2024-04-25 DIAGNOSIS — I49.3 PREMATURE VENTRICULAR CONTRACTIONS: ICD-10-CM

## 2024-04-25 DIAGNOSIS — E10.3599 TYPE 1 DIABETES MELLITUS WITH PROLIFERATIVE RETINOPATHY WITHOUT MACULAR EDEMA, UNSPECIFIED LATERALITY (HCC): Primary | ICD-10-CM

## 2024-04-25 DIAGNOSIS — I25.10 CORONARY ARTERY DISEASE INVOLVING NATIVE CORONARY ARTERY OF NATIVE HEART WITHOUT ANGINA PECTORIS: Primary | ICD-10-CM

## 2024-04-25 DIAGNOSIS — I25.10 CORONARY ARTERY DISEASE DUE TO CALCIFIED CORONARY LESION: ICD-10-CM

## 2024-04-25 DIAGNOSIS — R06.02 SHORTNESS OF BREATH: ICD-10-CM

## 2024-04-25 DIAGNOSIS — E78.5 DYSLIPIDEMIA: ICD-10-CM

## 2024-04-25 DIAGNOSIS — R60.0 LOWER EXTREMITY EDEMA: ICD-10-CM

## 2024-04-25 DIAGNOSIS — I25.84 CORONARY ARTERY DISEASE DUE TO CALCIFIED CORONARY LESION: ICD-10-CM

## 2024-04-25 PROCEDURE — 99214 OFFICE O/P EST MOD 30 MIN: CPT | Performed by: INTERNAL MEDICINE

## 2024-04-25 RX ORDER — METOPROLOL SUCCINATE 25 MG/1
25 TABLET, EXTENDED RELEASE ORAL DAILY
Qty: 90 TABLET | Refills: 3 | Status: SHIPPED | OUTPATIENT
Start: 2024-04-25

## 2024-04-25 NOTE — PROGRESS NOTES
Cardiology Consultation     Kimberlee Nicholas  [id number]  1975  [facility] [address] [address] [phone]    1. Coronary artery disease involving native coronary artery of native heart without angina pectoris        2. Premature ventricular contractions        3. Dyslipidemia        4. Shortness of breath        5. Lower extremity edema        6. Coronary artery disease due to calcified coronary lesion  metoprolol succinate (TOPROL-XL) 25 mg 24 hr tablet          Chief Complaint   Patient presents with    Follow-up     Overdue 1 month    Palpitations     Random     Shortness of Breath     Can occur when laying supine, bending over, going up flight of stairs    Edema     On occasion in b/l ankles     HPI: Patient is here for cardiac evaluation of lower extremity edema, more located in the ankles along with shortness of breath with exertion.  Going up flight of stairs or even walking from living room to other side of room she gets short of breath.  Even taking a shower makes her short of breath.  Symptoms started a few months ago and has been gradually getting worse and progressing.  She has occasional palpitations, however they are random and relatively few and far between.  No reported chest pain, lightheadedness, syncope, orthopnea, PND, or significant weight changes.  Patient remains active without any increased fatigue out of the ordinary.      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 (HCC)    Sarcoidosis    Medical marijuana use    Personal history of kidney stones    Premature ventricular contractions    Trigger finger of left thumb    Pulmonary nodules     "Current chronic use of systemic steroids    Vitamin D deficiency    Radicular pain of both lower extremities    Brisk deep tendon reflexes    Weakness of both hands    Radiculopathy, lumbar region    Axonal neuropathy    Shortness of breath    Lower extremity edema    Coronary artery disease involving native coronary artery of native heart without angina pectoris     Past Medical History:   Diagnosis Date    Allergic rhinitis     Anxiety     Arthritis     osteo    Asthma     borderline    Chronic pain disorder     right knee, lower back    CTS (carpal tunnel syndrome)     Depression     Diabetes mellitus (HCC)     Type 1 x 36yrs,,\"on insulin pump for approx 17 yrs or so\"    Difficulty walking     Disease of thyroid gland     hypothyroid    Dry eyes, bilateral     Fatigue     GERD (gastroesophageal reflux disease)     gastroparesis    History of UTI     Kidney stone     several, hx of stent    Motion sickness     Nausea     Neuropathy     Neuropathy     Neuropathy in diabetes (HCC)     PONV (postoperative nausea and vomiting)     \"pt reports scopalamine patch does help\"    Presence of surgical incision     \"small biopsy site with steri and bandaid left outer thigh per pt report\"    Pulmonary nodule     PVC (premature ventricular contraction)     Sarcoidosis     \"is currently being tested to see if has it\"    Status post right partial knee replacement     right    Tooth missing     Wears glasses      Social History     Socioeconomic History    Marital status: /Civil Union     Spouse name: Not on file    Number of children: Not on file    Years of education: Not on file    Highest education level: Not on file   Occupational History    Occupation: housewife   Tobacco Use    Smoking status: Former     Current packs/day: 0.00     Average packs/day: 0.3 packs/day for 12.5 years (3.1 ttl pk-yrs)     Types: Cigarettes     Start date:      Quit date: 2003     Years since quittin.8    Smokeless tobacco: " Never    Tobacco comments:     Vaped for a couple of months during 2022.   Vaping Use    Vaping status: Former    Substances: THC   Substance and Sexual Activity    Alcohol use: Yes     Comment: On occasion, not daily    Drug use: Yes     Types: Marijuana     Comment: medical, usually edibles.    Sexual activity: Yes     Partners: Male     Birth control/protection: Post-menopausal     Comment: Hysterectomy   Other Topics Concern    Not on file   Social History Narrative    Not on file     Social Determinants of Health     Financial Resource Strain: Medium Risk (8/31/2022)    Received from [facility]    Overall Financial Resource Strain (CARDIA)     Difficulty of Paying Living Expenses: Somewhat hard   Food Insecurity: No Food Insecurity (8/31/2022)    Received from [facility]    Hunger Vital Sign     Worried About Running Out of Food in the Last Year: Never true     Ran Out of Food in the Last Year: Never true   Transportation Needs: No Transportation Needs (8/31/2022)    Received from [facility]    PRAPARE - Transportation     Lack of Transportation (Medical): No     Lack of Transportation (Non-Medical): No   Physical Activity: Inactive (8/31/2022)    Received from [facility]    Exercise Vital Sign     Days of Exercise per Week: 0 days     Minutes of Exercise per Session: 0 min   Stress: Stress Concern Present (8/31/2022)    Received from [facility]    [employer] - Occupational Stress Questionnaire     Feeling of Stress : To some extent   Social Connections: Moderately Isolated (8/31/2022)    Received from [facility]    Social Connection and Isolation Panel [NHANES]     Frequency of Communication with Friends and Family: Three times a week     Frequency of Social Gatherings with Friends and Family: Once a week     Attends Pentec[employer]al Services: Never     Active Member of Clubs or  Organizations: No     Attends Club or Organization Meetings: Never     Marital Status:    Intimate Partner Violence: Not At Risk (2022)    Received from [facility]    Humiliation, Afraid, Rape, and Kick questionnaire     Fear of Current or Ex-Partner: No     Emotionally Abused: No     Physically Abused: No     Sexually Abused: No   Housing Stability: Unknown (2022)    Received from [facility]    Housing Stability Vital Sign     Unable to Pay for Housing in the Last Year: Patient refused     Number of Places Lived in the Last Year: Not on file     Unstable Housing in the Last Year: No      Family History   Problem Relation Age of Onset    Arthritis Mother     Asthma Mother     Heart disease Mother     Neuropathy Mother     COPD Father     Heart disease Father     Hyperlipidemia Father     Hypertension Father     Diabetes Father     Diabetes type II Father     Hypothyroidism Father     Diabetes type I Sister     Breast cancer Sister     Diabetes type I Sister     Maple syrup urine disease Son      Past Surgical History:   Procedure Laterality Date    APPENDECTOMY      CARDIAC CATHETERIZATION  10/27/2023    Procedure: Cardiac catheterization;  Surgeon: Keyon Mann MD;  Location: [facility];  Service: Cardiology    CARDIAC CATHETERIZATION N/A 10/27/2023    Procedure: Cardiac Coronary Angiogram;  Surgeon: Keyon Mann MD;  Location: [facility];  Service: Cardiology    CARPAL TUNNEL RELEASE Bilateral      SECTION      CYSTOSCOPY W/ LASER LITHOTRIPSY Left 2017    Procedure: CYSTOSCOPY, LEFT RETROGRADE PYELOGRAM LEFT URETEROSCOPY,  LEFT STENT INSERTION;  Surgeon: Nav Dougherty MD;  Location: [facility] OR;  Service: Urology    DILATION AND CURETTAGE OF UTERUS      FINGER SURGERY      HYSTERECTOMY      JOINT REPLACEMENT Right     partial knee    KNEE ARTHROSCOPY Right     x4    KNEE SURGERY      MI CYSTO/URETERO W/LITHOTRIPSY &INDWELL  STENT INSRT Left 12/28/2017    Procedure: CYSTOSCOPY URETEROSCOPY,  RETROGRADE PYELOGRAM AND INSERTION STENT URETERAL;  Surgeon: Nav Dougherty MD;  Location: [facility];  Service: Urology    REMOVAL URETERAL STENT  12/2017    SKIN BIOPSY Left     outer thigh    TRIGGER FINGER RELEASE      mul    URETERAL STENT PLACEMENT      VITRECTOMY Bilateral     right eye x2 and left x1    WISDOM TOOTH EXTRACTION         Current Outpatient Medications:     Accu-Chek FastClix Lancets MISC, Test BG up to 10x daily as directed, Disp: 900 each, Rfl: 1    Acetone, Urine, Test (Ketone Test) STRP, Use as needed to test urine if BG >300 or sick, Disp: 100 strip, Rfl: 1    Antiseptic Products, Misc. (IV Prep Wipes) 70 % PADS, Use 1 daily as directed, Disp: 100 each, Rfl: 1    Blood Glucose Calibration (Accu-Chek Guide Control) LIQD, Use as needed (to calibrate), Disp: 1 each, Rfl: 1    Blood Glucose Monitoring Suppl (Accu-Chek Guide Me) w/Device KIT, Test BG up to 10x daily as directed, Disp: 1 kit, Rfl: 0    cetirizine (ZyrTEC) 10 mg tablet, Take 10 mg by mouth daily, Disp: , Rfl:     Continuous Blood Gluc Sensor (Dexcom G6 Sensor) MISC, USE DAILY AS DIRECTED FOR  CONTINUOUS GLUCOSE         MONITORING, CHANGE EVERY 10DAYS, Disp: 9 each, Rfl: 0    Continuous Blood Gluc Transmit (Dexcom G6 Transmitter) MISC, USE DAILY AS DIRECTED FOR  CONTINUOUS GLUCOSE         MONITORING, CHANGE EVERY 3 MONTHS, Disp: 1 each, Rfl: 1    Glucagon (Baqsimi Two Pack) 3 MG/DOSE POWD, Use as needed for severe hypoglycemia Disp 1 two pack, Disp: 1 each, Rfl: 1    glucose blood (Accu-Chek Guide) test strip, Test BG up to 10x daily as directed, Disp: 900 strip, Rfl: 0    Insulin Degludec 100 UNIT/ML SOLN, Use 30units subcut once daily in case of pump failure, Disp: 10 mL, Rfl: 2    Insulin Disposable Pump (Omnipod 5 G6 Intro, Gen 5,) KIT, USE DAILY AS DIRECTED FOR  INSULIN THERAPY, Disp: 1 kit, Rfl: 0    Insulin Disposable Pump (Omnipod 5 G6 Pod, Gen 5,)  "MISC, USE 1 POD EVERY 48 HOURS   SUBCUTANEOUSLY, Disp: 45 each, Rfl: 0    insulin glulisine (Apidra) 100 units/mL injection, USE WITH INSULIN PUMP 80-100UNITS PER DAY WITH PUMP, Disp: 90 mL, Rfl: 3    Insulin Syringe 27G X 1/2\" 1 ML MISC, Use 3 (three) times a day Use in case of pump failure, Disp: 100 each, Rfl: 1    Isopropyl Alcohol (Alcohol Wipes) 70 % MISC, Use up to 5 daily as directed, Disp: 500 each, Rfl: 1    levothyroxine 100 mcg tablet, Take 1 tablet (100 mcg total) by mouth daily Take one pill by mouth Mon- Sat and 1/2 tab on Sunday, Disp: 90 tablet, Rfl: 2    methotrexate 15 MG tablet, Take 17.5 mg by mouth once a week Pt reports taking 20 mg weekly, Disp: , Rfl:     metoprolol succinate (TOPROL-XL) 25 mg 24 hr tablet, Take 1 tablet (25 mg total) by mouth daily, Disp: 90 tablet, Rfl: 3    montelukast (SINGULAIR) 10 mg tablet, Take 10 mg by mouth daily at bedtime, Disp: , Rfl:     predniSONE 10 mg tablet, Take 10 mg by mouth daily, Disp: , Rfl:     rosuvastatin (CRESTOR) 20 MG tablet, Take 1 tablet (20 mg total) by mouth daily, Disp: 90 tablet, Rfl: 3    SERTRALINE HCL PO, Take 150 mg by mouth daily, Disp: , Rfl:     budesonide-formoterol (SYMBICORT) 160-4.5 mcg/act inhaler, Inhale 2 puffs daily  (Patient not taking: Reported on 4/25/2024), Disp: , Rfl:     levalbuterol (XOPENEX HFA) 45 mcg/act inhaler, Inhale 1-2 puffs every 4 (four) hours as needed for wheezing (Patient not taking: Reported on 4/25/2024), Disp: , Rfl:     levalbuterol (XOPENEX) 0.31 mg/3 mL nebulizer solution, Take 0.31 mg by nebulization every 4 (four) hours as needed for wheezing (Patient not taking: Reported on 4/25/2024), Disp: , Rfl:   Allergies   Allergen Reactions    Insulin Aspart Abdominal Pain     Humulog and Novulog  Welts under skin and insulin resistance    Morphine Other (See Comments)     Intensifies pain    Iodinated Contrast Media Hives     Pt can tolerate shellfish, pt can tolerate betadine. Pt pt she is not allergic " "to IV contrast    Levofloxacin     Oxycodone GI Intolerance     Pt is able to tolerate hydrocodone     Vitals:    04/25/24 1312   BP: 118/62   BP Location: Left arm   Patient Position: Sitting   Cuff Size: Standard   Pulse: 85   Weight: 93 kg (205 lb)   Height: 5' 3\" (1.6 m)       Labs:  Appointment on 03/01/2024   Component Date Value    Hemoglobin A1C 03/01/2024 7.7 (H)     EAG 03/01/2024 174     Creatinine, Ur 03/01/2024 92.2     Albumin,U,Random 03/01/2024 11.9     Albumin Creat Ratio 03/01/2024 13    Admission on 10/27/2023, Discharged on 10/27/2023   Component Date Value    Ventricular Rate 10/27/2023 86     Atrial Rate 10/27/2023 86     PA Interval 10/27/2023 132     QRSD Interval 10/27/2023 78     QT Interval 10/27/2023 366     QTC Interval 10/27/2023 437     P Axis 10/27/2023 52     QRS Axis 10/27/2023 33     T Wave [place] 10/27/2023 38      No results found for: \"CHOL\", \"TRIG\", \"HDL\", \"LDLDIRECT\"  Imaging: CT chest without contrast    Result Date: 10/6/2023  Narrative: CT CHEST WITHOUT IV CONTRAST INDICATION:   D86.9: Sarcoidosis, unspecified. COMPARISON: Chest radiograph 8/8/2023. TECHNIQUE: CT examination of the chest was performed without intravenous contrast.  Axial, sagittal, and coronal 2D reformatted images were created from the source data and submitted for interpretation. Radiation dose length product (DLP) for this visit:  362 mGy-cm .  This examination, like all CT scans performed in the [facility], was performed utilizing techniques to minimize radiation dose exposure, including the use of iterative reconstruction and automated exposure control. FINDINGS: LARGE AIRWAYS: Large airways are clear with no tracheal or endobronchial lesion. LUNGS: No consolidation. No edema. Middle lobe 5 mm typical perifissural nodule (4/58) in keeping with intrapulmonary lymph node. There are very few scattered sub-4 mm pulmonary nodules in both lungs, including (series 4): - Left upper lobe 2-3 mm " nodules (2x) on image 55. - Left lower lobe 3 mm nodule on image 61. - Left upper lobe lingular 2-3 mm nodule on image 74. PLEURA: No pneumothorax. No pleural effusion. HEART: Normal cardiac size and morphology. minimal LAD coronary artery calcification. No pericardial effusion or thickening. VESSELS: Normal caliber thoracic aorta with no detectable atherosclerotic plaque. Common origin of the brachiocephalic and left common carotid arteries, a normal variant. Normal caliber main pulmonary artery. MEDIASTINUM AND LISETTE:  Within normal limits. No lymphadenopathy. CHEST WALL AND LOWER NECK:   Within normal limits. VISUALIZED STRUCTURES IN THE UPPER ABDOMEN: Within normal limits. BONES: Mild multilevel degenerative changes in the spine. Vertebral body height is maintained. No acute fracture or destructive osseous lesion.     Impression: Very few scattered sub-4 mm pulmonary nodules which are likely of infectious or inflammatory etiology. No lymphadenopathy in the chest. Workstation performed: [id number]       Review of Systems:  Review of Systems   Constitutional:  Negative for activity change, appetite change, chills, diaphoresis, fatigue and unexpected weight change.   HENT:  Negative for hearing loss, nosebleeds and sore throat.    Eyes:  Negative for photophobia and visual disturbance.   Respiratory:  Positive for shortness of breath. Negative for cough, chest tightness and wheezing.    Cardiovascular:  Positive for palpitations and leg swelling. Negative for chest pain.   Gastrointestinal:  Negative for abdominal pain, diarrhea, nausea and vomiting.   Endocrine: Negative for polyuria.   Genitourinary:  Negative for dysuria, frequency and hematuria.   Musculoskeletal:  Negative for arthralgias, back pain, gait problem and neck pain.   Skin:  Negative for pallor and rash.   Neurological:  Negative for dizziness, syncope and headaches.   Hematological:  Does not bruise/bleed easily.   Psychiatric/Behavioral:   "Negative for behavioral problems and confusion.        Physical Exam:  Physical Exam  Vitals reviewed.   Constitutional:       Appearance: Normal appearance. She is well-developed. She is not ill-appearing or diaphoretic.   HENT:      Head: Normocephalic and atraumatic.      Nose: Nose normal.   Eyes:      General: No scleral icterus.     Extraocular Movements: Extraocular movements intact.      Pupils: Pupils are equal, round, and reactive to light.   Neck:      Vascular: No JVD.   Cardiovascular:      Rate and Rhythm: Normal rate and regular rhythm.      Heart sounds: Normal heart sounds. No murmur heard.     No friction rub. No gallop.   Pulmonary:      Effort: Pulmonary effort is normal. No respiratory distress.      Breath sounds: Normal breath sounds. No wheezing or rales.   Abdominal:      General: Bowel sounds are normal. There is no distension.      Palpations: Abdomen is soft.      Tenderness: There is no abdominal tenderness.   Musculoskeletal:         General: No deformity. Normal range of motion.      Cervical back: Normal range of motion and neck supple.      Right lower leg: No edema.      Left lower leg: No edema.   Skin:     General: Skin is warm and dry.      Findings: No rash.   Neurological:      Mental Status: She is alert and oriented to person, place, and time.      Cranial Nerves: No cranial nerve deficit.   Psychiatric:         Mood and Affect: Mood normal.         Behavior: Behavior normal.       Blood pressure 118/62, pulse 85, height 5' 3\" (1.6 m), weight 93 kg (205 lb), not currently breastfeeding.    EKG:  Normal sinus rhythm  Normal ECG    Discussion/Summary:  Shortness of breath and lower extremity edema: unclear etiology.  Has risk factors of type 1 diabetes mellitus, dyslipidemia, family history of heart disease, former smoker, with minimal LAD coronary calcification seen on CT chest in September 2023.  Her family history is of both her parents with LAD lesions that caused MIs, so " we proceeded with cardiac cath for more definitive diagnosis for ischemic disease and high pre-test probability.  She had an echocardiogram in August 2023 revealing no significant structural heart disease.  Her heart catheterization was normal in October 2023.  Shortness of breath and lower extremity edema are unlikely to be of cardiac etiology.    Hyperlipidemia: Continued on statin.  LDL well controlled at 67 in September 2022.  Repeat levels ordered in her chart, recommended completion.    PVCs: Continued on metoprolol and symptoms seem to be controlled other than random episodes of palpitations.

## 2024-04-26 ENCOUNTER — TELEPHONE (OUTPATIENT)
Age: 49
End: 2024-04-26

## 2024-04-26 ENCOUNTER — HOSPITAL ENCOUNTER (OUTPATIENT)
Facility: MEDICAL CENTER | Age: 49
Discharge: HOME | End: 2024-04-26
Payer: COMMERCIAL

## 2024-04-26 DIAGNOSIS — M54.16 RADICULOPATHY, LUMBAR REGION: ICD-10-CM

## 2024-04-26 DIAGNOSIS — E10.3593 TYPE 1 DIABETES MELLITUS WITH PROLIFERATIVE RETINOPATHY OF BOTH EYES WITHOUT MACULAR EDEMA (HCC): ICD-10-CM

## 2024-04-26 PROCEDURE — G1004 CDSM NDSC: HCPCS

## 2024-04-26 PROCEDURE — 72148 MRI LUMBAR SPINE W/O DYE: CPT

## 2024-04-26 RX ORDER — INSULIN ASPART INJECTION 100 [IU]/ML
INJECTION, SOLUTION SUBCUTANEOUS
Qty: 90 ML | Refills: 1 | Status: SHIPPED | OUTPATIENT
Start: 2024-04-26

## 2024-04-29 RX ORDER — INSULIN PMP CART,AUT,G6/7,CNTR
EACH SUBCUTANEOUS
Qty: 30 EACH | Refills: 1 | Status: SHIPPED | OUTPATIENT
Start: 2024-04-29

## 2024-04-29 NOTE — TELEPHONE ENCOUNTER
PA for Apidra Approved     Date(s) approved April 29, 2024 to April 29, 2025     Case #     Patient advised by          [x] streamithart Message  [] Phone call   []LMOM  []L/M to call office as no active Communication consent on file  []Unable to leave detailed message as VM not approved on Communication consent         Pharmacy advised by    [x]Fax  []Phone call    Approval letter scanned into Media Yes

## 2024-04-29 NOTE — TELEPHONE ENCOUNTER
PA for Apidra    Submitted via    []CMM-KEY    [x][employer]-Case ID # [id number]   []Faxed to plan   []Other website    []Phone call Case ID #      Office notes sent, clinical questions answered. Awaiting determination    Turnaround time for your insurance to make a decision on your Prior Authorization can take 7-21 business days.

## 2024-05-03 ENCOUNTER — TELEPHONE (OUTPATIENT)
Dept: NEUROLOGY | Facility: CLINIC | Age: 49
End: 2024-05-03

## 2024-05-03 NOTE — TELEPHONE ENCOUNTER
Pt called in to schedule a f/u appt. States MRI was completed. Accepted next open slot on 5/9/24 at 130pm w/ Dr Roe

## 2024-05-09 NOTE — TELEPHONE ENCOUNTER
Pt called to r/s appt 5/9 at 130 due to not feeling well at all today- declined V V offer- requested to r/s. Accepted next open slot on 6/24 at 2pm w/ Dr Roe.

## 2024-05-26 DIAGNOSIS — E10.3599 TYPE 1 DIABETES MELLITUS WITH PROLIFERATIVE RETINOPATHY WITHOUT MACULAR EDEMA, UNSPECIFIED LATERALITY (HCC): ICD-10-CM

## 2024-05-27 RX ORDER — PROCHLORPERAZINE 25 MG/1
SUPPOSITORY RECTAL
Qty: 9 EACH | Refills: 1 | Status: SHIPPED | OUTPATIENT
Start: 2024-05-27

## 2024-06-17 ENCOUNTER — APPOINTMENT (OUTPATIENT)
Dept: LAB | Facility: CLINIC | Age: 49
End: 2024-06-17
Payer: COMMERCIAL

## 2024-06-17 DIAGNOSIS — Z79.899 ENCOUNTER FOR LONG-TERM (CURRENT) USE OF OTHER MEDICATIONS: ICD-10-CM

## 2024-06-17 LAB
BASOPHILS # BLD AUTO: 0.1 THOUSANDS/ÂΜL (ref 0–0.1)
BASOPHILS NFR BLD AUTO: 1 % (ref 0–1)
EOSINOPHIL # BLD AUTO: 0.53 THOUSAND/ÂΜL (ref 0–0.61)
EOSINOPHIL NFR BLD AUTO: 5 % (ref 0–6)
ERYTHROCYTE [DISTWIDTH] IN BLOOD BY AUTOMATED COUNT: 14.7 % (ref 11.6–15.1)
HCT VFR BLD AUTO: 44 % (ref 34.8–46.1)
HGB BLD-MCNC: 14.4 G/DL (ref 11.5–15.4)
IMM GRANULOCYTES # BLD AUTO: 0.05 THOUSAND/UL (ref 0–0.2)
IMM GRANULOCYTES NFR BLD AUTO: 0 % (ref 0–2)
LYMPHOCYTES # BLD AUTO: 2.42 THOUSANDS/ÂΜL (ref 0.6–4.47)
LYMPHOCYTES NFR BLD AUTO: 21 % (ref 14–44)
MCH RBC QN AUTO: 33.8 PG (ref 26.8–34.3)
MCHC RBC AUTO-ENTMCNC: 32.7 G/DL (ref 31.4–37.4)
MCV RBC AUTO: 103 FL (ref 82–98)
MONOCYTES # BLD AUTO: 0.58 THOUSAND/ÂΜL (ref 0.17–1.22)
MONOCYTES NFR BLD AUTO: 5 % (ref 4–12)
NEUTROPHILS # BLD AUTO: 7.68 THOUSANDS/ÂΜL (ref 1.85–7.62)
NEUTS SEG NFR BLD AUTO: 68 % (ref 43–75)
NRBC BLD AUTO-RTO: 0 /100 WBCS
PLATELET # BLD AUTO: 272 THOUSANDS/UL (ref 149–390)
PMV BLD AUTO: 11.5 FL (ref 8.9–12.7)
RBC # BLD AUTO: 4.26 MILLION/UL (ref 3.81–5.12)
WBC # BLD AUTO: 11.36 THOUSAND/UL (ref 4.31–10.16)

## 2024-06-17 PROCEDURE — 85025 COMPLETE CBC W/AUTO DIFF WBC: CPT

## 2024-06-17 PROCEDURE — 36415 COLL VENOUS BLD VENIPUNCTURE: CPT

## 2024-06-18 ENCOUNTER — TELEPHONE (OUTPATIENT)
Dept: NEUROLOGY | Facility: CLINIC | Age: 49
End: 2024-06-18

## 2024-06-21 NOTE — PROGRESS NOTES
Patient ID: Kimberlee Nicholas is a 49 y.o. female.    Assessment/Plan:    Hand numbness  She has bilateral hand numbness and has been dropping things.  She has mild weakness of the intrinsic hand muscles.  Phalen sign is present bilaterally.  She has had carpal tunnel release surgeries in the past.  She may have ulnar neuropathy.    Recommendations:  -Check EMG bilateral upper extremities  -I will ask EMG  to contact the patient    Low back pain  She continues to have low back pain.  EMG showed L2-L4 chronic radiculopathy.  MRI only showed mild noncompressive degenerative changes.  She has a stiffness in her low back that worsens with activity.    Recommendations:  -Physical therapy evaluation    Diabetic polyneuropathy associated with type 1 diabetes mellitus (HCC)    Lab Results   Component Value Date    HGBA1C 7.7 (H) 03/01/2024     EMG showed moderate axonal sensory polyneuropathy.  This is likely related to underlying diabetes.  She did not tolerate gabapentin.  She is concerned about the side effect profile of Lyrica.  She is already on Zoloft.    Recommendations:  -Trial of alpha lipoic acid 300 mg twice daily         Brisk deep tendon reflexes  She has very brisk reflexes in the arms with Elkins + on the left.  MRI cervical spine was negative for cord lesion.     Sarcoidosis:  She is on methotrexate and prednisone 10 mg daily    Follow-up in 6 months or sooner if difficulties     I have spent a total time of 35 minutes on 06/24/24 in caring for this patient including Diagnostic results, Prognosis, Risks and benefits of tx options, Instructions for management, Patient and family education, Importance of tx compliance, Risk factor reductions, Impressions, Counseling / Coordination of care, Documenting in the medical record, Reviewing / ordering tests, medicine, procedures  , and Obtaining or reviewing history  .        Subjective:    Mrs Nicholas is a 49 yr old lady with diabetes type I, sarcoid and diabetic  neuropathy here for follow up of asymmetric neuropathic pain. Last seen in July 2023.    Since last visit, she did undergo EMG of the lower extremities which showed chronic L2-L4 radiculopathy on both sides and moderate axonal large-fiber sensory neuropathy.  She subsequently underwent lumbar spine MRI which showed noncompressive degenerative changes.  We also checked an MRI of the cervical spine due to markedly brisk reflexes which was unremarkable except for chronic microangiopathic changes seen in the mary and moderate to advanced sphenoid sinus mucosal thickening.    She has had a lot of respiratory issues due the sarcoid. She was stared on methotrexate. She remains on prednisone. The methotrexate has helped significantly with the breathing and cutaneous lesions. She cannot smell or taste if off the prednisone.     She did not try the alpha lipoic acid. She forgot.     Blood sugars have been well controlled she has DM1 since.  1980.  She is on chronic insulin.  She had an EMG done prior to the study with me and was told she has diabetic neuropathy in her 30s.    She continues to have low back pain. She has allodynia in the thighs, Her back feels like it is in a tight cramped position.  The pain worsens if she is standing for too long.  The numbness also worsens on standing.  She tried gabapentin in the past but it made her too tired.  She tried it just at night she tried gabapentin in the past but it made her too tired.  She tried it just at night and still could not tolerate it.  She is now off of gabapentin.  She was reluctant to try Lyrica due to adverse effects.  She is currently on sertraline.  Medical marijuana was not helpful.    No change in hand symptoms. She drops things. The hands swell.  She has had carpal tunnel release surgeries in the past. In 2019 or 2020, while she washing a cast iron grate, she dropped it on her toe and fractured the toe. She has to  things tighter than normal. She is not  aware how tightly she is holding things.      She has constant fatigue and brain fog.     Sometimes after urinating, she feels the toilet paper is saturated upon wiping.  She notes that she is urinating again after completing urinating.  She has never had incontinence other than upon laughing too hard.  She does not have a sense of incomplete bladder emptying if she has left the toilet.  She denies any odd sensation in the torso.     She has sarcoid. She was having sinus issues, anosmia and trouble with taste.  ENT was initially suspicious of sarcoid.  She subsequently saw dermatology andshe had skin biopsies of thighs which she tells me were ultimately diagnostic of sarcoidosis.       Her mother has polymiositis and spinal stenosis.         Objective:    Blood pressure 112/74, pulse 81, temperature 97.7 °F (36.5 °C), temperature source Temporal, weight 91.6 kg (202 lb), SpO2 97%, not currently breastfeeding.    Physical Exam  Constitutional:       Appearance: Normal appearance.      Comments: Elevated BMI   HENT:      Mouth/Throat:      Mouth: Mucous membranes are moist.      Pharynx: Oropharynx is clear.   Eyes:      Conjunctiva/sclera: Conjunctivae normal.   Neck:      Vascular: No carotid bruit.   Cardiovascular:      Rate and Rhythm: Normal rate and regular rhythm.      Heart sounds: Normal heart sounds.   Pulmonary:      Effort: Pulmonary effort is normal.      Breath sounds: Normal breath sounds.   Psychiatric:         Mood and Affect: Mood normal.         Behavior: Behavior normal.         Neurological Exam  Mental status: Awake, alert, oriented to person place and time.  Naming, recall, knowledge, repetition, concentration and naming intact.     Cranial nerves: Extraocular movements intact.  Pupils equally round and reactive to light.  Visual fields full to confrontation.  Facial sensation intact.  Face symmetric.  Hearing intact.  Tongue, uvula, palate midline and intact.  Sternocleidomastoid intact.      Motor: Strength 5-/5 in the upper extremities, except intrinsic hand muscles 4/5. Strength 5/5 in the lower extremities.   Normal tone.  Tinel sign absent bilaterally.  Phalen sign present bilaterally.  She has tenderness at the cubital tunnel on both sides.     Cerebellar: No dysmetria.  Heel-to-shin intact.  Gait slow and cautious.  Decreased arm swing bilaterally.     Reflexes: 3+ in the arms, 2-3+ at the knees and absent at the ankles.       Sensory: No dermatomal sensory loss in the hands.  Reduced sensation in the right leg in a stocking distribution.  Vibration 11 seconds at the right and 13 seconds at the left great toe.      ROS:  Review of Systems  Constitutional:  Negative for appetite change, fatigue and fever.   HENT: Negative.  Negative for hearing loss, tinnitus, trouble swallowing and voice change.    Eyes: Negative.  Negative for photophobia, pain and visual disturbance.   Respiratory: Negative.  Negative for shortness of breath.    Cardiovascular: Negative.  Negative for palpitations.   Gastrointestinal: Negative.  Negative for nausea and vomiting.   Endocrine: Negative.  Negative for cold intolerance.   Genitourinary: Negative.  Negative for dysuria, frequency and urgency.   Musculoskeletal:  Positive for gait problem (balance issues). Negative for back pain, myalgias, neck pain and neck stiffness.   Skin: Negative.  Negative for rash.   Allergic/Immunologic: Negative.    Neurological:  Positive for weakness (weakness) and numbness (b/l outer thighs, hands on and off). Negative for dizziness, tremors, seizures, syncope, facial asymmetry, speech difficulty, light-headedness and headaches.   Hematological: Negative.  Does not bruise/bleed easily.   Psychiatric/Behavioral: Negative.  Negative for confusion, hallucinations and sleep disturbance.             DATA:  EMG BLE 9/11/23:  This is an abnormal study.   1. The neurophysiological pattern of abnormalities is consistent with chronic L2-L4  radiculopathy on both sides. MRI lumbosacral spine without contrast may be helpful provided there are no contraindications.  2. There is neurophysiological evidence of moderate, axonal, large-fiber, sensory polyneuropathy, which can be seen in patients with diabetes. Clinical correlation is advised.    MRI c spine 9/8/23:  Normal MRI of the cervical spine.  Mild white matter change within the mary suggestive of chronic microangiopathic change.  Moderate to advanced sphenoid sinus mucosal thickening partially visualized on this exam.      MRI lumbar spine 4/26/24:  L1-L2: Small central disc herniation, protrusion type. No significant central canal or neural foraminal narrowing.  L3-L4: Small left paracentral/neural foraminal disc protrusion. Minimal narrowing left subarticular zone. Central canal and right neural foramen patent. Spondylotic changes are slightly progressed from the prior study.  L4-L5: There is a diffuse disk bulge.  No significant central canal or neural foraminal narrowing. This level is similar to the prior study.  L5-S1: There is a diffuse disk bulge.  No significant central canal or neural foraminal narrowing. This level is similar to the prior study.  Mild noncompressive lumbar degenerative change.

## 2024-06-24 ENCOUNTER — OFFICE VISIT (OUTPATIENT)
Dept: NEUROLOGY | Facility: CLINIC | Age: 49
End: 2024-06-24
Payer: COMMERCIAL

## 2024-06-24 VITALS
BODY MASS INDEX: 35.78 KG/M2 | WEIGHT: 202 LBS | HEART RATE: 81 BPM | DIASTOLIC BLOOD PRESSURE: 74 MMHG | SYSTOLIC BLOOD PRESSURE: 112 MMHG | TEMPERATURE: 97.7 F | OXYGEN SATURATION: 97 %

## 2024-06-24 DIAGNOSIS — R20.0 HAND NUMBNESS: Primary | ICD-10-CM

## 2024-06-24 DIAGNOSIS — E10.42 DIABETIC POLYNEUROPATHY ASSOCIATED WITH TYPE 1 DIABETES MELLITUS (HCC): ICD-10-CM

## 2024-06-24 DIAGNOSIS — M54.50 LOW BACK PAIN: ICD-10-CM

## 2024-06-24 PROCEDURE — 99214 OFFICE O/P EST MOD 30 MIN: CPT | Performed by: PSYCHIATRY & NEUROLOGY

## 2024-06-24 RX ORDER — FOLIC ACID 1 MG/1
1 TABLET ORAL DAILY
COMMUNITY

## 2024-06-24 NOTE — ASSESSMENT & PLAN NOTE
She has bilateral hand numbness and has been dropping things.  She has mild weakness of the intrinsic hand muscles.  Phalen sign is present bilaterally.  She has had carpal tunnel release surgeries in the past.  She may have ulnar neuropathy.    Recommendations:  -Check EMG bilateral upper extremities  -I will ask EMG  to contact the patient

## 2024-06-24 NOTE — ASSESSMENT & PLAN NOTE
She continues to have low back pain.  EMG showed L2-L4 chronic radiculopathy.  MRI only showed mild noncompressive degenerative changes.  She has a stiffness in her low back that worsens with activity.    Recommendations:  -Physical therapy evaluation

## 2024-06-24 NOTE — PATIENT INSTRUCTIONS
Please try alpha lipoic acid 300 mg twice a day  You can buy it on [employer] or in [facility]    EMG  will call you to schedule

## 2024-06-24 NOTE — PROGRESS NOTES
Patient ID: Kimberlee Nicholas is a 49 y.o. female.    Assessment/Plan:    No problem-specific Assessment & Plan notes found for this encounter.       {Assess/PlanSmartLinks:28882}       Subjective:    HPI    {[facility] Neurology HPI texts:48189}    {Common ambulatory SmartLinks:63771}         Objective:    Blood pressure 112/74, pulse 81, temperature 97.7 °F (36.5 °C), temperature source Temporal, weight 91.6 kg (202 lb), SpO2 97%, not currently breastfeeding.    Physical Exam    Neurological Exam      ROS:    Review of Systems   Constitutional:  Negative for appetite change, fatigue and fever.   HENT: Negative.  Negative for hearing loss, tinnitus, trouble swallowing and voice change.    Eyes: Negative.  Negative for photophobia, pain and visual disturbance.   Respiratory: Negative.  Negative for shortness of breath.    Cardiovascular: Negative.  Negative for palpitations.   Gastrointestinal: Negative.  Negative for nausea and vomiting.   Endocrine: Negative.  Negative for cold intolerance.   Genitourinary: Negative.  Negative for dysuria, frequency and urgency.   Musculoskeletal:  Positive for gait problem (balance issues). Negative for back pain, myalgias, neck pain and neck stiffness.   Skin: Negative.  Negative for rash.   Allergic/Immunologic: Negative.    Neurological:  Positive for weakness (weakness) and numbness (b/l outer thighs, hands on and off). Negative for dizziness, tremors, seizures, syncope, facial asymmetry, speech difficulty, light-headedness and headaches.   Hematological: Negative.  Does not bruise/bleed easily.   Psychiatric/Behavioral: Negative.  Negative for confusion, hallucinations and sleep disturbance.

## 2024-06-24 NOTE — ASSESSMENT & PLAN NOTE
Lab Results   Component Value Date    HGBA1C 7.7 (H) 03/01/2024     EMG showed moderate axonal sensory polyneuropathy.  This is likely related to underlying diabetes.  She did not tolerate gabapentin.  She is concerned about the side effect profile of Lyrica.  She is already on Zoloft.    Recommendations:  -Trial of alpha lipoic acid 300 mg twice daily

## 2024-07-26 ENCOUNTER — APPOINTMENT (OUTPATIENT)
Dept: LAB | Facility: CLINIC | Age: 49
End: 2024-07-26
Payer: COMMERCIAL

## 2024-07-26 DIAGNOSIS — Z79.899 ENCOUNTER FOR LONG-TERM (CURRENT) USE OF OTHER MEDICATIONS: ICD-10-CM

## 2024-07-26 DIAGNOSIS — E03.9 ACQUIRED HYPOTHYROIDISM: ICD-10-CM

## 2024-07-26 DIAGNOSIS — E10.3599 TYPE 1 DIABETES MELLITUS WITH PROLIFERATIVE RETINOPATHY WITHOUT MACULAR EDEMA, UNSPECIFIED LATERALITY (HCC): ICD-10-CM

## 2024-07-26 DIAGNOSIS — E78.5 DYSLIPIDEMIA: ICD-10-CM

## 2024-07-26 LAB
ALBUMIN SERPL BCG-MCNC: 4.4 G/DL (ref 3.5–5)
ALP SERPL-CCNC: 56 U/L (ref 34–104)
ALT SERPL W P-5'-P-CCNC: 19 U/L (ref 7–52)
ANION GAP SERPL CALCULATED.3IONS-SCNC: 10 MMOL/L (ref 4–13)
AST SERPL W P-5'-P-CCNC: 21 U/L (ref 13–39)
BASOPHILS # BLD AUTO: 0.08 THOUSANDS/ÂΜL (ref 0–0.1)
BASOPHILS NFR BLD AUTO: 1 % (ref 0–1)
BILIRUB SERPL-MCNC: 1.04 MG/DL (ref 0.2–1)
BUN SERPL-MCNC: 14 MG/DL (ref 5–25)
CALCIUM SERPL-MCNC: 9.3 MG/DL (ref 8.4–10.2)
CHLORIDE SERPL-SCNC: 104 MMOL/L (ref 96–108)
CHOLEST SERPL-MCNC: 141 MG/DL
CO2 SERPL-SCNC: 29 MMOL/L (ref 21–32)
CREAT SERPL-MCNC: 0.93 MG/DL (ref 0.6–1.3)
EOSINOPHIL # BLD AUTO: 0.45 THOUSAND/ÂΜL (ref 0–0.61)
EOSINOPHIL NFR BLD AUTO: 4 % (ref 0–6)
ERYTHROCYTE [DISTWIDTH] IN BLOOD BY AUTOMATED COUNT: 14.5 % (ref 11.6–15.1)
EST. AVERAGE GLUCOSE BLD GHB EST-MCNC: 160 MG/DL
GFR SERPL CREATININE-BSD FRML MDRD: 72 ML/MIN/1.73SQ M
GLUCOSE P FAST SERPL-MCNC: 99 MG/DL (ref 65–99)
HBA1C MFR BLD: 7.2 %
HCT VFR BLD AUTO: 41.4 % (ref 34.8–46.1)
HDLC SERPL-MCNC: 50 MG/DL
HGB BLD-MCNC: 13.7 G/DL (ref 11.5–15.4)
IMM GRANULOCYTES # BLD AUTO: 0.07 THOUSAND/UL (ref 0–0.2)
IMM GRANULOCYTES NFR BLD AUTO: 1 % (ref 0–2)
LDLC SERPL CALC-MCNC: 73 MG/DL (ref 0–100)
LYMPHOCYTES # BLD AUTO: 2.51 THOUSANDS/ÂΜL (ref 0.6–4.47)
LYMPHOCYTES NFR BLD AUTO: 22 % (ref 14–44)
MCH RBC QN AUTO: 34.2 PG (ref 26.8–34.3)
MCHC RBC AUTO-ENTMCNC: 33.1 G/DL (ref 31.4–37.4)
MCV RBC AUTO: 103 FL (ref 82–98)
MONOCYTES # BLD AUTO: 0.8 THOUSAND/ÂΜL (ref 0.17–1.22)
MONOCYTES NFR BLD AUTO: 7 % (ref 4–12)
NEUTROPHILS # BLD AUTO: 7.53 THOUSANDS/ÂΜL (ref 1.85–7.62)
NEUTS SEG NFR BLD AUTO: 65 % (ref 43–75)
NONHDLC SERPL-MCNC: 91 MG/DL
NRBC BLD AUTO-RTO: 0 /100 WBCS
PLATELET # BLD AUTO: 252 THOUSANDS/UL (ref 149–390)
PMV BLD AUTO: 11 FL (ref 8.9–12.7)
POTASSIUM SERPL-SCNC: 3.7 MMOL/L (ref 3.5–5.3)
PROT SERPL-MCNC: 6.9 G/DL (ref 6.4–8.4)
RBC # BLD AUTO: 4.01 MILLION/UL (ref 3.81–5.12)
SODIUM SERPL-SCNC: 143 MMOL/L (ref 135–147)
TRIGL SERPL-MCNC: 89 MG/DL
TSH SERPL DL<=0.05 MIU/L-ACNC: 2.22 UIU/ML (ref 0.45–4.5)
WBC # BLD AUTO: 11.44 THOUSAND/UL (ref 4.31–10.16)

## 2024-07-26 PROCEDURE — 80061 LIPID PANEL: CPT

## 2024-07-26 PROCEDURE — 80053 COMPREHEN METABOLIC PANEL: CPT

## 2024-07-26 PROCEDURE — 83036 HEMOGLOBIN GLYCOSYLATED A1C: CPT

## 2024-07-26 PROCEDURE — 84443 ASSAY THYROID STIM HORMONE: CPT

## 2024-07-26 PROCEDURE — 85025 COMPLETE CBC W/AUTO DIFF WBC: CPT

## 2024-07-26 PROCEDURE — 36415 COLL VENOUS BLD VENIPUNCTURE: CPT

## 2024-09-09 DIAGNOSIS — E10.3599 TYPE 1 DIABETES MELLITUS WITH PROLIFERATIVE RETINOPATHY WITHOUT MACULAR EDEMA, UNSPECIFIED LATERALITY (HCC): ICD-10-CM

## 2024-09-09 DIAGNOSIS — Z96.41 INSULIN PUMP STATUS: ICD-10-CM

## 2024-09-09 NOTE — TELEPHONE ENCOUNTER
Reason for call: Patient is requesting Apidra refill because Fiasp is on back order at the pharmacy.    [x] Refill   [] Prior Auth  [] Other:     Office:   [] PCP/Provider -   [] Specialty/Provider -         Pharmacy: [facility]/pharmacy #[id number] - [place] - [address]     Does the patient have enough for 3 days?   [x] Yes   [] No - Send as HP to POD

## 2024-09-10 RX ORDER — LANCETS
EACH MISCELLANEOUS
Qty: 900 EACH | Refills: 1 | Status: SHIPPED | OUTPATIENT
Start: 2024-09-10

## 2024-09-10 RX ORDER — INSULIN GLULISINE 100 [IU]/ML
INJECTION, SOLUTION SUBCUTANEOUS
Qty: 90 ML | Refills: 1 | Status: SHIPPED | OUTPATIENT
Start: 2024-09-10

## 2024-09-12 ENCOUNTER — TELEPHONE (OUTPATIENT)
Age: 49
End: 2024-09-12

## 2024-09-12 NOTE — TELEPHONE ENCOUNTER
Pt is scheduled for an EMG on 9/16 with Dr. Roe.   Pt called to let the Dr know that she  her left wrist and pinky finger.     Pt assumes she needs to cancel the EMG. Please cancel and call pt with Dr. Roe's recommendations.     Thank you.

## 2024-09-19 DIAGNOSIS — E10.3593 TYPE 1 DIABETES MELLITUS WITH PROLIFERATIVE RETINOPATHY OF BOTH EYES WITHOUT MACULAR EDEMA (HCC): ICD-10-CM

## 2024-09-20 RX ORDER — INSULIN PMP CART,AUT,G6/7,CNTR
EACH SUBCUTANEOUS
Qty: 30 EACH | Refills: 0 | Status: SHIPPED | OUTPATIENT
Start: 2024-09-20

## 2024-10-18 DIAGNOSIS — E78.5 DYSLIPIDEMIA: ICD-10-CM

## 2024-10-18 RX ORDER — ROSUVASTATIN CALCIUM 20 MG/1
20 TABLET, COATED ORAL DAILY
Qty: 90 TABLET | Refills: 1 | Status: SHIPPED | OUTPATIENT
Start: 2024-10-18

## 2024-11-15 DIAGNOSIS — E10.3599 TYPE 1 DIABETES MELLITUS WITH PROLIFERATIVE RETINOPATHY WITHOUT MACULAR EDEMA, UNSPECIFIED LATERALITY (HCC): ICD-10-CM

## 2024-11-15 RX ORDER — PROCHLORPERAZINE 25 MG/1
SUPPOSITORY RECTAL
Qty: 9 EACH | Refills: 1 | Status: SHIPPED | OUTPATIENT
Start: 2024-11-15

## 2024-11-26 ENCOUNTER — TELEPHONE (OUTPATIENT)
Age: 49
End: 2024-11-26

## 2024-11-26 DIAGNOSIS — E03.9 ACQUIRED HYPOTHYROIDISM: ICD-10-CM

## 2024-11-26 DIAGNOSIS — E10.3599 TYPE 1 DIABETES MELLITUS WITH PROLIFERATIVE RETINOPATHY WITHOUT MACULAR EDEMA, UNSPECIFIED LATERALITY (HCC): Primary | ICD-10-CM

## 2024-11-26 NOTE — TELEPHONE ENCOUNTER
Patient called returning phone call.    Spoke with a MA Team Member regarding reason.    Advised patient that Fiasp 100 UNIT/ML SOLN was unavailable at pharmacy and we are waiting for [facility] to call us back.    Patient stated she had switched to insulin glulisine (Apidra) 100 units/mL injection (9/10/24).    Please Advise.

## 2024-11-27 NOTE — TELEPHONE ENCOUNTER
She can continue with insulin glulisine (Apidra). Not sure what I am advising about on this message? Does she need a refill?

## 2025-01-10 ENCOUNTER — APPOINTMENT (OUTPATIENT)
Dept: LAB | Facility: CLINIC | Age: 50
End: 2025-01-10
Payer: COMMERCIAL

## 2025-01-10 ENCOUNTER — TRANSCRIBE ORDERS (OUTPATIENT)
Dept: LAB | Facility: CLINIC | Age: 50
End: 2025-01-10

## 2025-01-10 DIAGNOSIS — Z79.899 POLYPHARMACY: ICD-10-CM

## 2025-01-10 DIAGNOSIS — D68.9 BLOOD CLOTTING DISORDER (HCC): ICD-10-CM

## 2025-01-10 DIAGNOSIS — E03.9 ACQUIRED HYPOTHYROIDISM: ICD-10-CM

## 2025-01-10 DIAGNOSIS — Z79.899 POLYPHARMACY: Primary | ICD-10-CM

## 2025-01-10 DIAGNOSIS — E10.3599 TYPE 1 DIABETES MELLITUS WITH PROLIFERATIVE RETINOPATHY WITHOUT MACULAR EDEMA, UNSPECIFIED LATERALITY (HCC): ICD-10-CM

## 2025-01-10 LAB
ALBUMIN SERPL BCG-MCNC: 4.1 G/DL (ref 3.5–5)
ALP SERPL-CCNC: 59 U/L (ref 34–104)
ALT SERPL W P-5'-P-CCNC: 13 U/L (ref 7–52)
ANION GAP SERPL CALCULATED.3IONS-SCNC: 7 MMOL/L (ref 4–13)
AST SERPL W P-5'-P-CCNC: 31 U/L (ref 13–39)
BASOPHILS # BLD AUTO: 0.12 THOUSANDS/ΜL (ref 0–0.1)
BASOPHILS NFR BLD AUTO: 1 % (ref 0–1)
BILIRUB SERPL-MCNC: 0.56 MG/DL (ref 0.2–1)
BUN SERPL-MCNC: 21 MG/DL (ref 5–25)
CALCIUM SERPL-MCNC: 8.7 MG/DL (ref 8.4–10.2)
CHLORIDE SERPL-SCNC: 107 MMOL/L (ref 96–108)
CO2 SERPL-SCNC: 28 MMOL/L (ref 21–32)
CREAT SERPL-MCNC: 0.91 MG/DL (ref 0.6–1.3)
CREAT UR-MCNC: 137.2 MG/DL
EOSINOPHIL # BLD AUTO: 0.65 THOUSAND/ΜL (ref 0–0.61)
EOSINOPHIL NFR BLD AUTO: 6 % (ref 0–6)
ERYTHROCYTE [DISTWIDTH] IN BLOOD BY AUTOMATED COUNT: 14.2 % (ref 11.6–15.1)
EST. AVERAGE GLUCOSE BLD GHB EST-MCNC: 169 MG/DL
GFR SERPL CREATININE-BSD FRML MDRD: 74 ML/MIN/1.73SQ M
GLUCOSE P FAST SERPL-MCNC: 77 MG/DL (ref 65–99)
HBA1C MFR BLD: 7.5 %
HCT VFR BLD AUTO: 41.3 % (ref 34.8–46.1)
HGB BLD-MCNC: 12.8 G/DL (ref 11.5–15.4)
IMM GRANULOCYTES # BLD AUTO: 0.06 THOUSAND/UL (ref 0–0.2)
IMM GRANULOCYTES NFR BLD AUTO: 1 % (ref 0–2)
LYMPHOCYTES # BLD AUTO: 2.48 THOUSANDS/ΜL (ref 0.6–4.47)
LYMPHOCYTES NFR BLD AUTO: 24 % (ref 14–44)
MCH RBC QN AUTO: 32.7 PG (ref 26.8–34.3)
MCHC RBC AUTO-ENTMCNC: 31 G/DL (ref 31.4–37.4)
MCV RBC AUTO: 105 FL (ref 82–98)
MICROALBUMIN UR-MCNC: 11.5 MG/L
MICROALBUMIN/CREAT 24H UR: 8 MG/G CREATININE (ref 0–30)
MONOCYTES # BLD AUTO: 0.86 THOUSAND/ΜL (ref 0.17–1.22)
MONOCYTES NFR BLD AUTO: 8 % (ref 4–12)
NEUTROPHILS # BLD AUTO: 6.29 THOUSANDS/ΜL (ref 1.85–7.62)
NEUTS SEG NFR BLD AUTO: 60 % (ref 43–75)
NRBC BLD AUTO-RTO: 0 /100 WBCS
PLATELET # BLD AUTO: 274 THOUSANDS/UL (ref 149–390)
PMV BLD AUTO: 12.3 FL (ref 8.9–12.7)
POTASSIUM SERPL-SCNC: 4.3 MMOL/L (ref 3.5–5.3)
PROT SERPL-MCNC: 6.6 G/DL (ref 6.4–8.4)
RBC # BLD AUTO: 3.92 MILLION/UL (ref 3.81–5.12)
SODIUM SERPL-SCNC: 142 MMOL/L (ref 135–147)
TSH SERPL DL<=0.05 MIU/L-ACNC: 1.34 UIU/ML (ref 0.45–4.5)
WBC # BLD AUTO: 10.46 THOUSAND/UL (ref 4.31–10.16)

## 2025-01-10 PROCEDURE — 36415 COLL VENOUS BLD VENIPUNCTURE: CPT

## 2025-01-10 PROCEDURE — 82570 ASSAY OF URINE CREATININE: CPT

## 2025-01-10 PROCEDURE — 83036 HEMOGLOBIN GLYCOSYLATED A1C: CPT

## 2025-01-10 PROCEDURE — 84443 ASSAY THYROID STIM HORMONE: CPT

## 2025-01-10 PROCEDURE — 85025 COMPLETE CBC W/AUTO DIFF WBC: CPT

## 2025-01-10 PROCEDURE — 80053 COMPREHEN METABOLIC PANEL: CPT

## 2025-01-10 PROCEDURE — 82043 UR ALBUMIN QUANTITATIVE: CPT

## 2025-01-14 ENCOUNTER — RESULTS FOLLOW-UP (OUTPATIENT)
Dept: ENDOCRINOLOGY | Facility: CLINIC | Age: 50
End: 2025-01-14

## 2025-03-24 DIAGNOSIS — E10.3599 TYPE 1 DIABETES MELLITUS WITH PROLIFERATIVE RETINOPATHY WITHOUT MACULAR EDEMA, UNSPECIFIED LATERALITY (HCC): ICD-10-CM

## 2025-03-24 RX ORDER — PROCHLORPERAZINE 25 MG/1
SUPPOSITORY RECTAL
Qty: 1 EACH | Refills: 0 | Status: SHIPPED | OUTPATIENT
Start: 2025-03-24

## 2025-03-24 RX ORDER — PROCHLORPERAZINE 25 MG/1
SUPPOSITORY RECTAL
Qty: 1 EACH | Refills: 0 | OUTPATIENT
Start: 2025-03-24

## 2025-03-24 NOTE — TELEPHONE ENCOUNTER
Not a duplicate - Short term supply needed through Local pharmacy, 90 Day Supply through Mail Order. Please refill.     Patient warmed transferred to scheduling to make a follow up appointment.

## 2025-03-24 NOTE — TELEPHONE ENCOUNTER
Reason for call:   [x] Refill   [] Prior Auth  [] Other: Short Term Supply (1 each) to Local [facility] - 3 Month Supply through [facility].     Office:   [] PCP/Provider -   [x] Specialty/Provider - Tanna Styles MD / [facility]    Medication: Continuous Blood Gluc Transmit (Dexcom G6 Transmitter) MISC / USE DAILY AS DIRECTED FOR CONTINUOUS GLUCOSE MONITORING, CHANGE EVERY 3 MONTHS     Pharmacy: [facility]/pharmacy #[id number] - [place] - [address] [facility]   Does the patient have enough for 10 days?   [] Yes   [x] No - Send as HP to POD

## 2025-04-02 ENCOUNTER — TELEPHONE (OUTPATIENT)
Dept: ENDOCRINOLOGY | Facility: CLINIC | Age: 50
End: 2025-04-02

## 2025-04-02 NOTE — TELEPHONE ENCOUNTER
Please start prior authorization for patient    Apidra 100UNIT/ML solution    CoverMyMeds  Key: [id number]    Thank you

## 2025-04-14 ENCOUNTER — OFFICE VISIT (OUTPATIENT)
Dept: ENDOCRINOLOGY | Facility: CLINIC | Age: 50
End: 2025-04-14
Payer: COMMERCIAL

## 2025-04-14 VITALS
HEIGHT: 63 IN | WEIGHT: 192.6 LBS | SYSTOLIC BLOOD PRESSURE: 128 MMHG | DIASTOLIC BLOOD PRESSURE: 76 MMHG | BODY MASS INDEX: 34.12 KG/M2 | OXYGEN SATURATION: 96 % | HEART RATE: 80 BPM

## 2025-04-14 DIAGNOSIS — E03.9 ACQUIRED HYPOTHYROIDISM: ICD-10-CM

## 2025-04-14 DIAGNOSIS — Z96.41 INSULIN PUMP STATUS: ICD-10-CM

## 2025-04-14 DIAGNOSIS — E10.69 TYPE 1 DIABETES MELLITUS WITH OTHER SPECIFIED COMPLICATION (HCC): ICD-10-CM

## 2025-04-14 DIAGNOSIS — E55.9 VITAMIN D DEFICIENCY: ICD-10-CM

## 2025-04-14 DIAGNOSIS — E10.3599 TYPE 1 DIABETES MELLITUS WITH PROLIFERATIVE RETINOPATHY WITHOUT MACULAR EDEMA, UNSPECIFIED LATERALITY (HCC): ICD-10-CM

## 2025-04-14 DIAGNOSIS — E10.3593 TYPE 1 DIABETES MELLITUS WITH PROLIFERATIVE RETINOPATHY OF BOTH EYES WITHOUT MACULAR EDEMA (HCC): Primary | ICD-10-CM

## 2025-04-14 DIAGNOSIS — E78.5 DYSLIPIDEMIA: ICD-10-CM

## 2025-04-14 PROCEDURE — 95251 CONT GLUC MNTR ANALYSIS I&R: CPT | Performed by: PHYSICIAN ASSISTANT

## 2025-04-14 PROCEDURE — 99214 OFFICE O/P EST MOD 30 MIN: CPT | Performed by: PHYSICIAN ASSISTANT

## 2025-04-14 RX ORDER — INSULIN PMP CART,AUT,G6/7,CNTR
EACH SUBCUTANEOUS
Qty: 30 EACH | Refills: 1 | Status: SHIPPED | OUTPATIENT
Start: 2025-04-14 | End: 2025-04-14 | Stop reason: SDUPTHER

## 2025-04-14 RX ORDER — INSULIN PMP CART,AUT,G6/7,CNTR
EACH SUBCUTANEOUS
Qty: 30 EACH | Refills: 1 | Status: SHIPPED | OUTPATIENT
Start: 2025-04-14 | End: 2025-04-16 | Stop reason: SDUPTHER

## 2025-04-14 RX ORDER — SYRINGE-NEEDLE,INSULIN,0.5 ML 27GX1/2"
SYRINGE, EMPTY DISPOSABLE MISCELLANEOUS 3 TIMES DAILY
Qty: 100 EACH | Refills: 1 | Status: SHIPPED | OUTPATIENT
Start: 2025-04-14 | End: 2025-04-15

## 2025-04-14 RX ORDER — INSULIN GLULISINE 100 [IU]/ML
INJECTION, SOLUTION SUBCUTANEOUS
Qty: 90 ML | Refills: 1 | Status: SHIPPED | OUTPATIENT
Start: 2025-04-14

## 2025-04-14 NOTE — ASSESSMENT & PLAN NOTE
Lab Results   Component Value Date    HGBA1C 7.5 (H) 01/10/2025   Current HbA1c represents suboptimal control. Blood sugars demonstrating variability likely due to missed/late boluses.   Continue current regimen   Advised to adhere to diabetic diet, and recommended staying active/exercising routinely.  Discussed symptoms and treatment of hypoglycemia.    Recommended routine follow-up with Ophthalmology and foot exams.   Ordered blood work to complete prior to next visit.  Orders:  •  Comprehensive metabolic panel; Future  •  Hemoglobin A1C; Future  •  Comprehensive metabolic panel; Future  •  Insulin Disposable Pump (Omnipod 5 [id number] Pods Gen 5) MISC; USE 1 POD EVERY 72 HOURS   SUBCUTANEOUSLY  •  Hemoglobin A1C; Future

## 2025-04-14 NOTE — ASSESSMENT & PLAN NOTE
Orders:  •  insulin glulisine (Apidra) 100 units/mL injection; USE WITH INSULIN PUMP 80-100UNITS PER DAY WITH PUMP

## 2025-04-14 NOTE — PROGRESS NOTES
"Name: Kimberlee Nicholas      : 1975      MRN: [id number]  Encounter Provider: Kori Trejo PA-C  Encounter Date: 2025   Encounter department: [facility] [place]    Chief Complaint   Patient presents with   • Diabetes Type 1   • Hypothyroidism   :  Assessment & Plan  Type 1 diabetes mellitus with proliferative retinopathy of both eyes without macular edema (HCC)    Lab Results   Component Value Date    HGBA1C 7.5 (H) 01/10/2025   Current HbA1c represents suboptimal control. Blood sugars demonstrating variability likely due to missed/late boluses.   Continue current regimen   Advised to adhere to diabetic diet, and recommended staying active/exercising routinely.  Discussed symptoms and treatment of hypoglycemia.    Recommended routine follow-up with Ophthalmology and foot exams.   Ordered blood work to complete prior to next visit.  Orders:  •  Comprehensive metabolic panel; Future  •  Hemoglobin A1C; Future  •  Comprehensive metabolic panel; Future  •  Insulin Disposable Pump (Omnipod 5 [id number] Pods Gen 5) MISC; USE 1 POD EVERY 72 HOURS   SUBCUTANEOUSLY  •  Hemoglobin A1C; Future    Acquired hypothyroidism  TSH most recently: 1.340  Maintained on levothyroxine 100 mcg daily 6 days/week, 50mcg 1 day/week    Orders:  •  TSH, 3rd generation with Free T4 reflex; Future    Vitamin D deficiency    Orders:  •  Vitamin D 25 hydroxy; Future    Dyslipidemia  Continue statin.   Check routine lipid panel.          Type 1 diabetes mellitus with other specified complication (HCC)    Lab Results   Component Value Date    HGBA1C 7.5 (H) 01/10/2025       Orders:  •  Insulin Syringe-Needle U-100 (B-D INSULIN SYRINGE 1CC/27G) 27G X 1/2\" 1 ML MISC; Use 3 (three) times a day Use in case of pump failure    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, unspecified laterality (HCC)    Lab Results   Component Value Date    HGBA1C 7.5 (H) 01/10/2025 "     Orders:  •  insulin glulisine (Apidra) 100 units/mL injection; USE WITH INSULIN PUMP 80-100UNITS PER DAY WITH PUMP    Insulin pump status    Orders:  •  insulin glulisine (Apidra) 100 units/mL injection; USE WITH INSULIN PUMP 80-100UNITS PER DAY WITH PUMP        History of Present Illness {?Quick Links Encounters * My Last Note * Last Note in Specialty * Snapshot * Since Last Visit * History :78903}  History of Present Illness  Kimberlee Nicholas is a 49-year-old female here for follow-up of her type 1 diabetes and hypothyroidism. Diabetes is complicated by retinopathy.     She has not sought podiatric care due to the absence of foot-related issues. She reports occasional hyperglycemia, which she attributes to changes in her pod. She is considering requesting a prescription for syringes to administer additional insulin doses during these periods. She also occasionally experiences hypoglycemia around 6 PM or 7 PM, the cause of which is unknown to her. She occasionally perceives a delay in her bolus administration.      She has a history of swallowing difficulties predating her thyroid condition. She is currently on levothyroxine 100 mcg daily from Monday to Saturday and half a tablet on Sundays.    She is postmenopausal, having undergone a hysterectomy at age 38, retaining one ovary. She experienced menopausal symptoms such as hot flashes and weight gain. Blood work conducted two years ago confirmed her postmenopausal status. She reports no recent falls or fractures. She is not currently taking a vitamin D supplement.    Supplemental Information:  She was ill a few weeks ago, resulting in bed rest for three days, but has since recovered. She was not on any steroids during this period and has not had any recent hospitalizations. She experiences episodes of nausea, fatigue, and lightheadedness, rendering her bedridden for several days. These episodes typically last 3 to 4 days, after which she recovers. This pattern has  "been ongoing for several years without a definitive diagnosis.        MEDICATIONS  Current: Lasix, methotrexate, Apidra, levothyroxine    CGM Interpretation:  Date Range: 4/1 - 4/14  Device used: Dexcom  Home use  Indication: Type 1 Diabetes  Analysis of data:   Average Glucose: 160  GMI: N/A  Coefficient of Variation: 33%  SD: 53 mg/dL  Time in Target Range: 67%   Time Above Range: 27% high, 5% very high  Time Below Range: 1% low, 0% very low  Interpretation of data: Occasional hyperglycemia, preceded by missed/late boluses. Some postprandial hypoglycemia.  More than 72 hours of data was reviewed. Report to be scanned to chart.     Current regimen:   Patient is on an Omnipod pump prescribed by Endocrinology.  She has been on this pump for 3 years. She denies any malfunctioning of the pump   Current Insulin pump settings:  Basal rate:  12am: 1  2am: 1.15  7am: 1.25  12pm: 1.2  4:30pm: 1.05  7pm: 1  Insulin to carb ratio:  12am: 1:7  12pm: 1:6  4:30pm: 1:5  Insulin sensitivity factor:  12am: 30  3am: 30  12pm: 30  5pm: 20  BG target:110  Active insulin time: 3 hours  Type of insulin: Apidra  Discussed with patient in case of malfunctioning of the pump to use basal and bolus therapy as backup which is prescribed to the patient. Also notified patient to call clinic if any issues.     Last Eye Exam: 08/10/2023  Last Foot Exam: 05/31/2023  Health Maintenance   Topic Date Due   • Diabetic Eye Exam  08/10/2024   • Diabetic Foot Exam  04/14/2026       Review of Systems as per [facility]         Medical History Reviewed by provider this encounter:     .    Objective {?Quick Links Trend Vitals * Enter New Vitals * Results Review * Timeline (Adult) * Labs * Imaging * Cardiology * Procedures * Lung Cancer Screening * Surgical eConsent :99263}  /76   Pulse 80   Ht 5' 3\" (1.6 m)   Wt 87.4 kg (192 lb 9.6 oz)   SpO2 96%   BMI 34.12 kg/m²      Body mass index is 34.12 kg/m².  Wt Readings from Last 3 Encounters:   04/14/25 87.4 " kg (192 lb 9.6 oz)   06/24/24 91.6 kg (202 lb)   04/25/24 93 kg (205 lb)     Physical Exam    Physical Exam  Vitals and nursing note reviewed.   Constitutional:       General: She is not in acute distress.     Appearance: She is well-developed.   HENT:      Head: Normocephalic and atraumatic.   Eyes:      General: No scleral icterus.     Conjunctiva/sclera: Conjunctivae normal.   Neck:      Thyroid: No thyroid mass, thyromegaly or thyroid tenderness.   Cardiovascular:      Pulses: no weak pulses.           Dorsalis pedis pulses are 2+ on the right side and 2+ on the left side.   Pulmonary:      Effort: Pulmonary effort is normal.   Feet:      Right foot:      Skin integrity: No ulcer, skin breakdown, erythema, warmth, callus or dry skin.      Left foot:      Skin integrity: No ulcer, skin breakdown, erythema, warmth, callus or dry skin.   Neurological:      Mental Status: She is alert.   Psychiatric:         Attention and Perception: Attention normal.     Patient's shoes and socks removed.    Right Foot/Ankle   Right Foot Inspection  Skin Exam: skin normal and skin intact. No dry skin, no warmth, no callus, no erythema, no maceration, no abnormal color, no pre-ulcer, no ulcer and no callus.     Toe Exam: ROM and strength within normal limits.     Sensory   Vibration: diminished  Monofilament testing: intact    Vascular  The right DP pulse is 2+.     Left Foot/Ankle  Left Foot Inspection  Skin Exam: skin normal and skin intact. No dry skin, no warmth, no erythema, no maceration, normal color, no pre-ulcer, no ulcer and no callus.     Toe Exam: ROM and strength within normal limits.     Sensory   Vibration: diminished  Monofilament testing: intact    Vascular  The left DP pulse is 2+.     Assign Risk Category  No deformity present  No loss of protective sensation  No weak pulses  Risk: 0    Results    Labs:   Lab Results   Component Value Date    HGBA1C 7.5 (H) 01/10/2025    HGBA1C 7.2 (H) 07/26/2024    HGBA1C 7.7 (H)  03/01/2024     Lab Results   Component Value Date    CREATININE 0.88 03/25/2025    CREATININE 0.91 01/10/2025    CREATININE 0.93 07/26/2024    BUN 10 03/25/2025    K 3.9 03/25/2025     03/25/2025    CO2 33 (H) 03/25/2025     GFR, Calculated   Date Value Ref Range Status   09/04/2019 70 >60 mL/min/1.73m2 Final     Comment:     mL/min per 1.73 square meters                                            Normal Function or Mild Renal    Disease (if clinically at risk):  >or=60  Moderately Decreased:                30-59  Severely Decreased:                  15-29  Renal Failure:                         <15                                            -American GFR: multiply reported GFR by 1.16    Please note that the eGFR is based on the CKD-EPI calculation, and is not intended to be used for drug dosing.                                            Note: Calculated GFR may not be an accurate indicator of renal function if the patient's renal function is not in a steady state.     eGFRcr   Date Value Ref Range Status   03/25/2025 81 >59 Final     Lab Results   Component Value Date    HDL 50 07/26/2024    TRIG 89 07/26/2024     Lab Results   Component Value Date    ALT 29 03/25/2025    AST 60 (H) 03/25/2025    ALKPHOS 79 03/25/2025     Lab Results   Component Value Date    [id number] 1.340 01/10/2025    [id number] 2.216 07/26/2024    [id number] 2.263 09/28/2023       There are no Patient Instructions on file for this visit.    Discussed with the patient and all questioned fully answered. She will call me if any problems arise.

## 2025-04-14 NOTE — ASSESSMENT & PLAN NOTE
TSH most recently: 1.340  Maintained on levothyroxine 100 mcg daily 6 days/week, 50mcg 1 day/week    Orders:  •  TSH, 3rd generation with Free T4 reflex; Future

## 2025-04-14 NOTE — ASSESSMENT & PLAN NOTE
Lab Results   Component Value Date    HGBA1C 7.5 (H) 01/10/2025     Orders:  •  insulin glulisine (Apidra) 100 units/mL injection; USE WITH INSULIN PUMP 80-100UNITS PER DAY WITH PUMP

## 2025-04-15 DIAGNOSIS — E78.5 DYSLIPIDEMIA: ICD-10-CM

## 2025-04-15 RX ORDER — NEEDLES, SAFETY 22GX1 1/2"
NEEDLE, DISPOSABLE MISCELLANEOUS
Qty: 100 EACH | Refills: 3 | Status: SHIPPED | OUTPATIENT
Start: 2025-04-15

## 2025-04-16 DIAGNOSIS — E10.3593 TYPE 1 DIABETES MELLITUS WITH PROLIFERATIVE RETINOPATHY OF BOTH EYES WITHOUT MACULAR EDEMA (HCC): ICD-10-CM

## 2025-04-16 RX ORDER — ROSUVASTATIN CALCIUM 20 MG/1
20 TABLET, COATED ORAL DAILY
Qty: 30 TABLET | Refills: 0 | Status: SHIPPED | OUTPATIENT
Start: 2025-04-16

## 2025-04-16 RX ORDER — INSULIN PMP CART,AUT,G6/7,CNTR
EACH SUBCUTANEOUS
Qty: 30 EACH | Refills: 1 | Status: SHIPPED | OUTPATIENT
Start: 2025-04-16

## 2025-04-16 NOTE — TELEPHONE ENCOUNTER
Reason for call:   [x] Refill   [] Prior Auth  [x] Other: Not a duplicate. Patient spoke to [facility] and they stated it was cancelled.     Office:   [] PCP/Provider -   [x] Specialty/Provider - [facility]     Medication:     Insulin Disposable Pump (Omnipod 5 [id number] Pods Gen 5) MISC       Dose/Frequency: USE 1 POD EVERY 72 HOURS SUBCUTANEOUSLY     Quantity: 30    Pharmacy: [id number]    Local Pharmacy   Does the patient have enough for 3 days?   [x] Yes   [] No - Send as HP to POD    Mail Away Pharmacy   Does the patient have enough for 10 days?   [] Yes   [] No - Send as HP to POD

## 2025-05-10 DIAGNOSIS — E78.5 DYSLIPIDEMIA: ICD-10-CM

## 2025-05-11 DIAGNOSIS — I25.84 CORONARY ARTERY DISEASE DUE TO CALCIFIED CORONARY LESION: ICD-10-CM

## 2025-05-11 DIAGNOSIS — I25.10 CORONARY ARTERY DISEASE DUE TO CALCIFIED CORONARY LESION: ICD-10-CM

## 2025-05-11 RX ORDER — METOPROLOL SUCCINATE 25 MG/1
25 TABLET, EXTENDED RELEASE ORAL DAILY
Qty: 90 TABLET | Refills: 1 | Status: SHIPPED | OUTPATIENT
Start: 2025-05-11

## 2025-05-14 RX ORDER — ROSUVASTATIN CALCIUM 20 MG/1
20 TABLET, COATED ORAL DAILY
Qty: 30 TABLET | Refills: 0 | Status: SHIPPED | OUTPATIENT
Start: 2025-05-14

## 2025-05-16 DIAGNOSIS — E10.3599 TYPE 1 DIABETES MELLITUS WITH PROLIFERATIVE RETINOPATHY WITHOUT MACULAR EDEMA, UNSPECIFIED LATERALITY (HCC): ICD-10-CM

## 2025-05-16 RX ORDER — PROCHLORPERAZINE 25 MG/1
SUPPOSITORY RECTAL
Qty: 9 EACH | Refills: 1 | Status: SHIPPED | OUTPATIENT
Start: 2025-05-16

## 2025-06-14 DIAGNOSIS — E78.5 DYSLIPIDEMIA: ICD-10-CM

## 2025-06-16 RX ORDER — ROSUVASTATIN CALCIUM 20 MG/1
20 TABLET, COATED ORAL DAILY
Qty: 90 TABLET | Refills: 1 | Status: SHIPPED | OUTPATIENT
Start: 2025-06-16

## 2025-06-26 ENCOUNTER — TELEPHONE (OUTPATIENT)
Dept: ENDOCRINOLOGY | Facility: CLINIC | Age: 50
End: 2025-06-26

## 2025-06-26 DIAGNOSIS — E10.3599 TYPE 1 DIABETES MELLITUS WITH PROLIFERATIVE RETINOPATHY WITHOUT MACULAR EDEMA, UNSPECIFIED LATERALITY (HCC): ICD-10-CM

## 2025-06-26 DIAGNOSIS — Z96.41 INSULIN PUMP STATUS: ICD-10-CM

## 2025-06-26 RX ORDER — INSULIN ASPART INJECTION 100 [IU]/ML
INJECTION, SOLUTION SUBCUTANEOUS
Refills: 3 | OUTPATIENT
Start: 2025-06-26

## 2025-06-26 NOTE — TELEPHONE ENCOUNTER
Reason for call:   [] Refill   [x] Prior Auth  [] Other:     Office:   [] PCP/Provider -   [x] Specialty/Provider - Kori Huang    Medication: Apidra    Dose/Frequency:  units per day in pump    Quantity: 90 ml     Pharmacy: [facility] [place]     Local Pharmacy   Does the patient have enough for 3 days?   [] Yes   [] No - Send as HP to POD    Mail Away Pharmacy   Does the patient have enough for 10 days?   [] Yes   [] No - Send as HP to POD

## 2025-06-26 NOTE — TELEPHONE ENCOUNTER
PA for insulin glulisine (Apidra) 100 units  APPROVED     Date(s) approved 6/26/25-6/26/26    Case #[id number]     Patient advised by          [x]EDUonGohart Message  []Phone call   []LMOM  []L/M to call office as no active Communication consent on file  []Unable to leave detailed message as VM not approved on Communication consent       Pharmacy advised by    [x]Fax  []Phone call  []Secure Chat    Specialty Pharmacy    []      Approval letter scanned into Media Yes

## 2025-06-26 NOTE — TELEPHONE ENCOUNTER
PA for insulin glulisine (Apidra) 100 units SUBMITTED to [facility]    via    [x][employer]-Case ID # [id number]  [x]PA sent as URGENT    All office notes, labs and other pertaining documents and studies sent. Clinical questions answered. Awaiting determination from insurance company.     Turnaround time for your insurance to make a decision on your Prior Authorization can take 7-21 business days.

## 2025-06-27 RX ORDER — PROCHLORPERAZINE 25 MG/1
SUPPOSITORY RECTAL
Qty: 1 EACH | Refills: 1 | Status: SHIPPED | OUTPATIENT
Start: 2025-06-27

## 2025-08-05 ENCOUNTER — TELEPHONE (OUTPATIENT)
Age: 50
End: 2025-08-05

## 2025-08-08 ENCOUNTER — HOSPITAL ENCOUNTER (OUTPATIENT)
Dept: CT IMAGING | Facility: HOSPITAL | Age: 50
Discharge: HOME | End: 2025-08-08
Payer: COMMERCIAL

## 2025-08-08 DIAGNOSIS — J33.9 NASAL POLYPOSIS: ICD-10-CM

## 2025-08-08 DIAGNOSIS — D86.9 SARCOIDOSIS: ICD-10-CM

## 2025-08-08 DIAGNOSIS — Z79.52 CURRENT CHRONIC USE OF SYSTEMIC STEROIDS: ICD-10-CM

## 2025-08-08 DIAGNOSIS — J32.4 CHRONIC PANSINUSITIS: ICD-10-CM

## 2025-08-08 PROCEDURE — 70486 CT MAXILLOFACIAL W/O DYE: CPT

## 2025-08-18 ENCOUNTER — OFFICE VISIT (OUTPATIENT)
Dept: ENDOCRINOLOGY | Facility: CLINIC | Age: 50
End: 2025-08-18
Payer: COMMERCIAL

## 2025-08-18 VITALS
SYSTOLIC BLOOD PRESSURE: 120 MMHG | BODY MASS INDEX: 33.84 KG/M2 | HEART RATE: 82 BPM | OXYGEN SATURATION: 98 % | HEIGHT: 63 IN | WEIGHT: 191 LBS | DIASTOLIC BLOOD PRESSURE: 70 MMHG

## 2025-08-18 DIAGNOSIS — E10.3599 TYPE 1 DIABETES MELLITUS WITH PROLIFERATIVE RETINOPATHY WITHOUT MACULAR EDEMA, UNSPECIFIED LATERALITY (HCC): Primary | ICD-10-CM

## 2025-08-18 DIAGNOSIS — E03.9 ACQUIRED HYPOTHYROIDISM: ICD-10-CM

## 2025-08-18 DIAGNOSIS — E78.5 DYSLIPIDEMIA: ICD-10-CM

## 2025-08-18 PROCEDURE — 95251 CONT GLUC MNTR ANALYSIS I&R: CPT

## 2025-08-18 PROCEDURE — 99214 OFFICE O/P EST MOD 30 MIN: CPT

## 2025-08-18 RX ORDER — ACYCLOVIR 400 MG/1
1 TABLET ORAL SEE ADMIN INSTRUCTIONS
Qty: 9 EACH | Refills: 5 | Status: SHIPPED | OUTPATIENT
Start: 2025-08-18

## 2025-08-18 RX ORDER — PANTOPRAZOLE SODIUM 20 MG
40 TABLET, DELAYED RELEASE (ENTERIC COATED) ORAL
COMMUNITY
Start: 2025-07-10

## (undated) DEVICE — DGW .035 FC J3MM 260CM TEF: Brand: EMERALD

## (undated) DEVICE — TRANSPOSAL ULTRAFLEX DUO/QUAD ULTRA CART MANIFOLD

## (undated) DEVICE — CATH F4 INF JL 3.5 100CM: Brand: INFINITI

## (undated) DEVICE — INVIEW CLEAR LEGGINGS: Brand: CONVERTORS

## (undated) DEVICE — UROCATCH BAG

## (undated) DEVICE — Device: Brand: ASAHI SILVERWAY

## (undated) DEVICE — GLOVE SRG BIOGEL 7

## (undated) DEVICE — PACK TUR

## (undated) DEVICE — SCD SEQUENTIAL COMPRESSION COMFORT SLEEVE MEDIUM KNEE LENGTH: Brand: KENDALL SCD

## (undated) DEVICE — SHEATH URETERAL ACCESS 10/12FR 45CM PROXIS

## (undated) DEVICE — RADIFOCUS OPTITORQUE ANGIOGRAPHIC CATHETER: Brand: OPTITORQUE

## (undated) DEVICE — LUBRICANT SURGILUBE TUBE 4 OZ  FLIP TOP

## (undated) DEVICE — ENDOSCOPIC VALVE WITH ADAPTER.: Brand: SURSEAL® II

## (undated) DEVICE — CATH BAL DIL URETERAL 6FR 10CM  BAL 4MM X-FRC U30

## (undated) DEVICE — TUBING SUCTION 5MM X 12 FT

## (undated) DEVICE — GLOVE SRG BIOGEL 7.5

## (undated) DEVICE — GUIDEWIRE STRGHT TIP 0.038 IN SOLO PLUS

## (undated) DEVICE — SYRINGE 3ML LL

## (undated) DEVICE — THE VASC BAND HEMOSTAT IS A COMPRESSION DEVICE TO ASSIST HEMOSTASIS OF ARTERIAL, VENOUS AND HEMODIALYSIS PERCUTANEOUS ACCESS SITES.: Brand: VASC BAND™ HEMOSTAT

## (undated) DEVICE — CATH URET .038 10FR 50CM DUAL LUMEN

## (undated) DEVICE — GLOVE INDICATOR PI UNDERGLOVE SZ 7.5 BLUE

## (undated) DEVICE — SPECIMEN CONTAINER STERILE PEEL PACK

## (undated) DEVICE — 3M™ TEGADERM™ TRANSPARENT FILM DRESSING FRAME STYLE, 1627, 4 IN X 10 IN (10 CM X 25 CM), 20/CT 4CT/CASE: Brand: 3M™ TEGADERM™

## (undated) DEVICE — GLOVE INDICATOR PI UNDERGLOVE SZ 7 BLUE

## (undated) DEVICE — GUIDEWIRE ANGLE TIP 0.038 IN SOLO PLUS

## (undated) DEVICE — GLIDESHEATH SLENDER STAINLESS STEEL KIT: Brand: GLIDESHEATH SLENDER

## (undated) DEVICE — Device: Brand: PROWATER

## (undated) DEVICE — GLIDESHEATH BASIC HYDROPHILIC COATED INTRODUCER SHEATH: Brand: GLIDESHEATH